# Patient Record
Sex: FEMALE | Race: WHITE | NOT HISPANIC OR LATINO | URBAN - METROPOLITAN AREA
[De-identification: names, ages, dates, MRNs, and addresses within clinical notes are randomized per-mention and may not be internally consistent; named-entity substitution may affect disease eponyms.]

---

## 2017-03-29 ENCOUNTER — OUTPATIENT (OUTPATIENT)
Dept: OUTPATIENT SERVICES | Facility: HOSPITAL | Age: 67
LOS: 1 days | Discharge: HOME | End: 2017-03-29

## 2017-06-07 ENCOUNTER — APPOINTMENT (OUTPATIENT)
Dept: SURGERY | Facility: CLINIC | Age: 67
End: 2017-06-07

## 2017-06-07 VITALS
SYSTOLIC BLOOD PRESSURE: 116 MMHG | HEIGHT: 59 IN | WEIGHT: 106 LBS | DIASTOLIC BLOOD PRESSURE: 72 MMHG | HEART RATE: 70 BPM | BODY MASS INDEX: 21.37 KG/M2 | TEMPERATURE: 98.1 F

## 2017-06-07 DIAGNOSIS — Z80.3 FAMILY HISTORY OF MALIGNANT NEOPLASM OF BREAST: ICD-10-CM

## 2017-06-07 DIAGNOSIS — Z80.7 FAMILY HISTORY OF OTHER MALIGNANT NEOPLASMS OF LYMPHOID, HEMATOPOIETIC AND RELATED TISSUES: ICD-10-CM

## 2017-06-07 DIAGNOSIS — Z87.891 PERSONAL HISTORY OF NICOTINE DEPENDENCE: ICD-10-CM

## 2017-06-27 DIAGNOSIS — R05 COUGH: ICD-10-CM

## 2017-08-07 ENCOUNTER — OUTPATIENT (OUTPATIENT)
Dept: OUTPATIENT SERVICES | Facility: HOSPITAL | Age: 67
LOS: 1 days | Discharge: HOME | End: 2017-08-07

## 2017-08-07 ENCOUNTER — APPOINTMENT (OUTPATIENT)
Dept: HEMATOLOGY ONCOLOGY | Facility: CLINIC | Age: 67
End: 2017-08-07

## 2017-08-07 VITALS
BODY MASS INDEX: 21.37 KG/M2 | SYSTOLIC BLOOD PRESSURE: 114 MMHG | RESPIRATION RATE: 14 BRPM | TEMPERATURE: 97.4 F | DIASTOLIC BLOOD PRESSURE: 70 MMHG | HEIGHT: 59 IN | WEIGHT: 106 LBS | HEART RATE: 67 BPM

## 2017-08-07 RX ORDER — MULTIVITAMIN
TABLET ORAL
Refills: 0 | Status: ACTIVE | COMMUNITY

## 2017-08-10 DIAGNOSIS — Z85.3 PERSONAL HISTORY OF MALIGNANT NEOPLASM OF BREAST: ICD-10-CM

## 2017-08-23 ENCOUNTER — OUTPATIENT (OUTPATIENT)
Dept: OUTPATIENT SERVICES | Facility: HOSPITAL | Age: 67
LOS: 1 days | Discharge: HOME | End: 2017-08-23

## 2017-08-23 DIAGNOSIS — Z85.3 PERSONAL HISTORY OF MALIGNANT NEOPLASM OF BREAST: ICD-10-CM

## 2017-08-29 DIAGNOSIS — R92.2 INCONCLUSIVE MAMMOGRAM: ICD-10-CM

## 2017-08-29 DIAGNOSIS — Z12.31 ENCOUNTER FOR SCREENING MAMMOGRAM FOR MALIGNANT NEOPLASM OF BREAST: ICD-10-CM

## 2018-05-15 ENCOUNTER — OUTPATIENT (OUTPATIENT)
Dept: OUTPATIENT SERVICES | Facility: HOSPITAL | Age: 68
LOS: 1 days | Discharge: HOME | End: 2018-05-15

## 2018-05-15 DIAGNOSIS — K86.2 CYST OF PANCREAS: ICD-10-CM

## 2018-05-19 LAB
ANION GAP SERPL CALC-SCNC: 11 MMOL/L
BUN SERPL-MCNC: 20 MG/DL
CALCIUM SERPL-MCNC: 9.6 MG/DL
CHLORIDE SERPL-SCNC: 101 MMOL/L
CO2 SERPL-SCNC: 30 MMOL/L
CREAT SERPL-MCNC: 0.8 MG/DL
GLUCOSE SERPL-MCNC: 70 MG/DL
POTASSIUM SERPL-SCNC: 4.9 MMOL/L
SODIUM SERPL-SCNC: 142 MMOL/L

## 2018-05-29 ENCOUNTER — OUTPATIENT (OUTPATIENT)
Dept: OUTPATIENT SERVICES | Facility: HOSPITAL | Age: 68
LOS: 1 days | Discharge: HOME | End: 2018-05-29

## 2018-05-29 DIAGNOSIS — K86.2 CYST OF PANCREAS: ICD-10-CM

## 2018-06-18 ENCOUNTER — APPOINTMENT (OUTPATIENT)
Dept: SURGERY | Facility: CLINIC | Age: 68
End: 2018-06-18
Payer: MEDICARE

## 2018-06-18 VITALS
DIASTOLIC BLOOD PRESSURE: 68 MMHG | SYSTOLIC BLOOD PRESSURE: 122 MMHG | WEIGHT: 106 LBS | TEMPERATURE: 98.6 F | HEART RATE: 74 BPM | HEIGHT: 59 IN | BODY MASS INDEX: 21.37 KG/M2

## 2018-06-18 PROCEDURE — 99213 OFFICE O/P EST LOW 20 MIN: CPT

## 2018-08-23 ENCOUNTER — APPOINTMENT (OUTPATIENT)
Dept: OBGYN | Facility: CLINIC | Age: 68
End: 2018-08-23
Payer: MEDICARE

## 2018-08-23 PROCEDURE — G0439: CPT

## 2018-08-23 PROCEDURE — G0101: CPT

## 2018-08-24 ENCOUNTER — OUTPATIENT (OUTPATIENT)
Dept: OUTPATIENT SERVICES | Facility: HOSPITAL | Age: 68
LOS: 1 days | Discharge: HOME | End: 2018-08-24

## 2018-08-24 DIAGNOSIS — Z85.3 PERSONAL HISTORY OF MALIGNANT NEOPLASM OF BREAST: ICD-10-CM

## 2018-08-24 DIAGNOSIS — Z12.31 ENCOUNTER FOR SCREENING MAMMOGRAM FOR MALIGNANT NEOPLASM OF BREAST: ICD-10-CM

## 2018-09-04 ENCOUNTER — APPOINTMENT (OUTPATIENT)
Dept: OBGYN | Facility: CLINIC | Age: 68
End: 2018-09-04
Payer: MEDICARE

## 2018-09-04 PROCEDURE — 76830 TRANSVAGINAL US NON-OB: CPT

## 2018-12-12 ENCOUNTER — APPOINTMENT (OUTPATIENT)
Dept: CARDIOLOGY | Facility: CLINIC | Age: 68
End: 2018-12-12

## 2018-12-12 VITALS
WEIGHT: 109 LBS | HEART RATE: 58 BPM | SYSTOLIC BLOOD PRESSURE: 126 MMHG | BODY MASS INDEX: 21.97 KG/M2 | HEIGHT: 59 IN | DIASTOLIC BLOOD PRESSURE: 70 MMHG

## 2018-12-12 NOTE — HISTORY OF PRESENT ILLNESS
[FreeTextEntry1] : The patient has a history of having breast CA . The patient has has BEASLEY which has remained unchanged. She has not had chest pain . The patient had a echocardiogram done which showed Moderate MR and TR .

## 2018-12-12 NOTE — REVIEW OF SYSTEMS
[Eyeglasses] : currently wearing eyeglasses [Shortness Of Breath] : shortness of breath [Incontinence] : incontinence [Negative] : Heme/Lymph [Chest Pain] : no chest pain [Palpitations] : no palpitations

## 2018-12-12 NOTE — ASSESSMENT
[FreeTextEntry1] : The patient has MR which is moderate . She has moderate TR as well. She radha not have signficant BEASLEY excpt when walking upstairs with her laundry . .

## 2018-12-12 NOTE — PHYSICAL EXAM
[General Appearance - Well Developed] : well developed [Normal Appearance] : normal appearance [Well Groomed] : well groomed [General Appearance - Well Nourished] : well nourished [No Deformities] : no deformities [General Appearance - In No Acute Distress] : no acute distress [Normal Conjunctiva] : the conjunctiva exhibited no abnormalities [Eyelids - No Xanthelasma] : the eyelids demonstrated no xanthelasmas [Normal Oral Mucosa] : normal oral mucosa [No Oral Pallor] : no oral pallor [No Oral Cyanosis] : no oral cyanosis [Normal Jugular Venous A Waves Present] : normal jugular venous A waves present [Normal Jugular Venous V Waves Present] : normal jugular venous V waves present [No Jugular Venous Vizcarra A Waves] : no jugular venous vizcarra A waves [Respiration, Rhythm And Depth] : normal respiratory rhythm and effort [Exaggerated Use Of Accessory Muscles For Inspiration] : no accessory muscle use [Auscultation Breath Sounds / Voice Sounds] : lungs were clear to auscultation bilaterally [Normal Rate] : normal [Rhythm Regular] : regular [No Murmur] : no murmurs heard [1+] : left 1+ [No Pitting Edema] : no pitting edema present [Abdomen Soft] : soft [Abdomen Tenderness] : non-tender [Abdomen Mass (___ Cm)] : no abdominal mass palpated [Abnormal Walk] : normal gait [Gait - Sufficient For Exercise Testing] : the gait was sufficient for exercise testing [Nail Clubbing] : no clubbing of the fingernails [Cyanosis, Localized] : no localized cyanosis [Petechial Hemorrhages (___cm)] : no petechial hemorrhages [Skin Color & Pigmentation] : normal skin color and pigmentation [] : no rash [No Venous Stasis] : no venous stasis [Skin Lesions] : no skin lesions [No Skin Ulcers] : no skin ulcer [No Xanthoma] : no  xanthoma was observed [Oriented To Time, Place, And Person] : oriented to person, place, and time [Affect] : the affect was normal [Mood] : the mood was normal [No Anxiety] : not feeling anxious [Rt] : no varicose veins of the right leg [Lt] : no varicose veins of the left leg

## 2019-01-24 ENCOUNTER — APPOINTMENT (OUTPATIENT)
Dept: CARDIOLOGY | Facility: CLINIC | Age: 69
End: 2019-01-24

## 2019-02-14 ENCOUNTER — APPOINTMENT (OUTPATIENT)
Dept: CARDIOLOGY | Facility: CLINIC | Age: 69
End: 2019-02-14

## 2019-05-08 ENCOUNTER — APPOINTMENT (OUTPATIENT)
Dept: SURGERY | Facility: CLINIC | Age: 69
End: 2019-05-08
Payer: MEDICARE

## 2019-05-08 ENCOUNTER — OUTPATIENT (OUTPATIENT)
Dept: OUTPATIENT SERVICES | Facility: HOSPITAL | Age: 69
LOS: 1 days | Discharge: HOME | End: 2019-05-08

## 2019-05-08 VITALS
HEIGHT: 59 IN | BODY MASS INDEX: 21.17 KG/M2 | DIASTOLIC BLOOD PRESSURE: 64 MMHG | TEMPERATURE: 99.2 F | HEART RATE: 77 BPM | SYSTOLIC BLOOD PRESSURE: 118 MMHG | WEIGHT: 105 LBS

## 2019-05-08 DIAGNOSIS — K86.2 CYST OF PANCREAS: ICD-10-CM

## 2019-05-08 PROCEDURE — 99214 OFFICE O/P EST MOD 30 MIN: CPT

## 2019-05-09 LAB
ANION GAP SERPL CALC-SCNC: 12 MMOL/L
BUN SERPL-MCNC: 12 MG/DL
CALCIUM SERPL-MCNC: 9.6 MG/DL
CANCER AG19-9 SERPL-ACNC: 5 U/ML
CHLORIDE SERPL-SCNC: 103 MMOL/L
CO2 SERPL-SCNC: 28 MMOL/L
CREAT SERPL-MCNC: 0.9 MG/DL
GLUCOSE SERPL-MCNC: 91 MG/DL
POTASSIUM SERPL-SCNC: 5.4 MMOL/L
SODIUM SERPL-SCNC: 143 MMOL/L

## 2019-05-14 NOTE — ASSESSMENT
[FreeTextEntry1] : Ngoc Nazario is a 67 yo female patient with multiple medical problems who comes today with the diagnosis of IPMN and multiple pancreatic cyst found on MRCP - MRI of the abdomen. Currently asymptomatic. She is here today refereed by Dr. Mujica for further treatment recommendation.\par \par Assessment and Plan:\par \par Asymptomatic.\par IPMN multifocal, two cyst, 5 and 6 mm, body-tail, normal PD, No adverse radiological factors.\par MRI with contrast ordered to asses IPMN, if stable next MRI in 1.5 to 2 years.\par \par Return in 1 year for reevaluation.\par \par All the questions were answered to their satisfaction and I encourage the patient to call my office at anytime if she had any questions. Plan of care fully discussed with the patient.\par

## 2019-05-14 NOTE — HISTORY OF PRESENT ILLNESS
[de-identified] : Ngoc Nazario is a 67 yo female patient with multiple medical problems who comes today with the diagnosis of IPMN and multiple pancreatic cyst found on MRCP - MRI of the abdomen. Currently asymptomatic. She is here today refereed by Dr. Mujica for further treatment recommendation.

## 2019-06-18 ENCOUNTER — APPOINTMENT (OUTPATIENT)
Dept: CARDIOLOGY | Facility: CLINIC | Age: 69
End: 2019-06-18
Payer: MEDICARE

## 2019-06-18 VITALS
DIASTOLIC BLOOD PRESSURE: 68 MMHG | SYSTOLIC BLOOD PRESSURE: 90 MMHG | WEIGHT: 105 LBS | BODY MASS INDEX: 21.17 KG/M2 | HEART RATE: 73 BPM | HEIGHT: 59 IN

## 2019-06-18 PROCEDURE — 99214 OFFICE O/P EST MOD 30 MIN: CPT

## 2019-06-18 PROCEDURE — 93000 ELECTROCARDIOGRAM COMPLETE: CPT

## 2019-06-18 NOTE — ASSESSMENT
[FreeTextEntry1] : The patient has MR which was moderate on intial echo mild on stress echo . No symptoms except when walking up stairs with Laundry . ETT echo was negative for ischemia at moderate exercise load by echo images. .

## 2019-06-18 NOTE — REVIEW OF SYSTEMS
[Eyeglasses] : currently wearing eyeglasses [Shortness Of Breath] : shortness of breath [Incontinence] : incontinence [Negative] : Psychiatric [Chest Pain] : no chest pain [Palpitations] : no palpitations

## 2019-06-18 NOTE — HISTORY OF PRESENT ILLNESS
[FreeTextEntry1] : The patient has a history of having breast CA . The patient has has BEASLEY which has remained unchanged. She has not had chest pain . The patient had a echocardiogram done which showed Moderate MR and TR . She had an ETT which showed nondiagnostic ST segement changes . Stress echo was negative for ischemia at moderate exercise load. at over 7 minutes

## 2019-06-18 NOTE — PHYSICAL EXAM
[Normal Appearance] : normal appearance [General Appearance - Well Developed] : well developed [Well Groomed] : well groomed [General Appearance - Well Nourished] : well nourished [General Appearance - In No Acute Distress] : no acute distress [No Deformities] : no deformities [Normal Conjunctiva] : the conjunctiva exhibited no abnormalities [Eyelids - No Xanthelasma] : the eyelids demonstrated no xanthelasmas [Normal Oral Mucosa] : normal oral mucosa [No Oral Cyanosis] : no oral cyanosis [No Oral Pallor] : no oral pallor [Normal Jugular Venous V Waves Present] : normal jugular venous V waves present [Normal Jugular Venous A Waves Present] : normal jugular venous A waves present [No Jugular Venous Vizcarra A Waves] : no jugular venous vizcarra A waves [Respiration, Rhythm And Depth] : normal respiratory rhythm and effort [Auscultation Breath Sounds / Voice Sounds] : lungs were clear to auscultation bilaterally [Abdomen Soft] : soft [Exaggerated Use Of Accessory Muscles For Inspiration] : no accessory muscle use [Abdomen Mass (___ Cm)] : no abdominal mass palpated [Abdomen Tenderness] : non-tender [Abnormal Walk] : normal gait [Nail Clubbing] : no clubbing of the fingernails [Gait - Sufficient For Exercise Testing] : the gait was sufficient for exercise testing [Petechial Hemorrhages (___cm)] : no petechial hemorrhages [Cyanosis, Localized] : no localized cyanosis [No Venous Stasis] : no venous stasis [Skin Color & Pigmentation] : normal skin color and pigmentation [] : no rash [Skin Lesions] : no skin lesions [No Skin Ulcers] : no skin ulcer [No Xanthoma] : no  xanthoma was observed [Oriented To Time, Place, And Person] : oriented to person, place, and time [Mood] : the mood was normal [Affect] : the affect was normal [Normal Rate] : normal [No Anxiety] : not feeling anxious [No Murmur] : no murmurs heard [Rhythm Regular] : regular [No Pitting Edema] : no pitting edema present [1+] : left 1+ [Rt] : no varicose veins of the right leg [Lt] : no varicose veins of the left leg

## 2019-08-26 ENCOUNTER — APPOINTMENT (OUTPATIENT)
Dept: OBGYN | Facility: CLINIC | Age: 69
End: 2019-08-26

## 2019-08-27 ENCOUNTER — OUTPATIENT (OUTPATIENT)
Dept: OUTPATIENT SERVICES | Facility: HOSPITAL | Age: 69
LOS: 1 days | Discharge: HOME | End: 2019-08-27
Payer: MEDICARE

## 2019-08-27 DIAGNOSIS — Z85.3 PERSONAL HISTORY OF MALIGNANT NEOPLASM OF BREAST: ICD-10-CM

## 2019-08-27 PROCEDURE — 77065 DX MAMMO INCL CAD UNI: CPT | Mod: 26,LT

## 2019-08-27 PROCEDURE — G0279: CPT | Mod: 26,LT

## 2019-12-13 ENCOUNTER — OUTPATIENT (OUTPATIENT)
Dept: OUTPATIENT SERVICES | Facility: HOSPITAL | Age: 69
LOS: 1 days | Discharge: HOME | End: 2019-12-13

## 2019-12-13 DIAGNOSIS — I07.1 RHEUMATIC TRICUSPID INSUFFICIENCY: ICD-10-CM

## 2019-12-13 DIAGNOSIS — E78.5 HYPERLIPIDEMIA, UNSPECIFIED: ICD-10-CM

## 2019-12-13 DIAGNOSIS — D49.0 NEOPLASM OF UNSPECIFIED BEHAVIOR OF DIGESTIVE SYSTEM: ICD-10-CM

## 2019-12-13 DIAGNOSIS — I34.0 NONRHEUMATIC MITRAL (VALVE) INSUFFICIENCY: ICD-10-CM

## 2019-12-15 LAB
ALBUMIN SERPL ELPH-MCNC: 4.2 G/DL
ALP BLD-CCNC: 58 U/L
ALT SERPL-CCNC: 15 U/L
ANION GAP SERPL CALC-SCNC: 11 MMOL/L
AST SERPL-CCNC: 21 U/L
BASOPHILS # BLD AUTO: 0.05 K/UL
BASOPHILS NFR BLD AUTO: 1.2 %
BILIRUB SERPL-MCNC: 0.5 MG/DL
BUN SERPL-MCNC: 12 MG/DL
CALCIUM SERPL-MCNC: 9.3 MG/DL
CHLORIDE SERPL-SCNC: 103 MMOL/L
CHOLEST SERPL-MCNC: 202 MG/DL
CHOLEST/HDLC SERPL: 3.9 RATIO
CO2 SERPL-SCNC: 28 MMOL/L
CREAT SERPL-MCNC: 0.8 MG/DL
EOSINOPHIL # BLD AUTO: 0.17 K/UL
EOSINOPHIL NFR BLD AUTO: 4.2 %
GLUCOSE SERPL-MCNC: 99 MG/DL
HCT VFR BLD CALC: 40.3 %
HDLC SERPL-MCNC: 52 MG/DL
HGB BLD-MCNC: 13.2 G/DL
IMM GRANULOCYTES NFR BLD AUTO: 0.2 %
LDLC SERPL CALC-MCNC: 141 MG/DL
LYMPHOCYTES # BLD AUTO: 1.35 K/UL
LYMPHOCYTES NFR BLD AUTO: 33 %
MAN DIFF?: NORMAL
MCHC RBC-ENTMCNC: 31.4 PG
MCHC RBC-ENTMCNC: 32.8 G/DL
MCV RBC AUTO: 95.7 FL
MONOCYTES # BLD AUTO: 0.31 K/UL
MONOCYTES NFR BLD AUTO: 7.6 %
NEUTROPHILS # BLD AUTO: 2.2 K/UL
NEUTROPHILS NFR BLD AUTO: 53.8 %
PLATELET # BLD AUTO: 223 K/UL
POTASSIUM SERPL-SCNC: 5.4 MMOL/L
PROT SERPL-MCNC: 6.3 G/DL
RBC # BLD: 4.21 M/UL
RBC # FLD: 12 %
SODIUM SERPL-SCNC: 142 MMOL/L
TRIGL SERPL-MCNC: 166 MG/DL
WBC # FLD AUTO: 4.09 K/UL

## 2019-12-19 ENCOUNTER — APPOINTMENT (OUTPATIENT)
Dept: CARDIOLOGY | Facility: CLINIC | Age: 69
End: 2019-12-19
Payer: MEDICARE

## 2019-12-19 VITALS
BODY MASS INDEX: 21.17 KG/M2 | WEIGHT: 105 LBS | HEIGHT: 59 IN | SYSTOLIC BLOOD PRESSURE: 104 MMHG | DIASTOLIC BLOOD PRESSURE: 70 MMHG

## 2019-12-19 PROCEDURE — 99214 OFFICE O/P EST MOD 30 MIN: CPT

## 2019-12-19 RX ORDER — ASPIRIN 81 MG
81 TABLET, DELAYED RELEASE (ENTERIC COATED) ORAL
Refills: 0 | Status: DISCONTINUED | COMMUNITY
End: 2019-12-19

## 2019-12-19 NOTE — ASSESSMENT
[FreeTextEntry1] : The patient has MR which was moderate on intial echo mild on stress echo . No symptoms except when walking up stairs with Laundry . ETT echo was negative for ischemia at moderate exercise load by echo images. .She has had pulmonary function test at her PCP's office and results are pending . The patient has increased LDL and Dr. Guerrero had increased her statin . Do not feel that she will get to goal .

## 2019-12-19 NOTE — PHYSICAL EXAM
[General Appearance - Well Developed] : well developed [Normal Appearance] : normal appearance [Well Groomed] : well groomed [General Appearance - Well Nourished] : well nourished [No Deformities] : no deformities [General Appearance - In No Acute Distress] : no acute distress [Normal Conjunctiva] : the conjunctiva exhibited no abnormalities [Eyelids - No Xanthelasma] : the eyelids demonstrated no xanthelasmas [Normal Oral Mucosa] : normal oral mucosa [No Oral Cyanosis] : no oral cyanosis [No Oral Pallor] : no oral pallor [Normal Jugular Venous A Waves Present] : normal jugular venous A waves present [No Jugular Venous Vizcarra A Waves] : no jugular venous vizcarra A waves [Normal Jugular Venous V Waves Present] : normal jugular venous V waves present [Respiration, Rhythm And Depth] : normal respiratory rhythm and effort [Exaggerated Use Of Accessory Muscles For Inspiration] : no accessory muscle use [Auscultation Breath Sounds / Voice Sounds] : lungs were clear to auscultation bilaterally [Abdomen Soft] : soft [Abdomen Tenderness] : non-tender [Abdomen Mass (___ Cm)] : no abdominal mass palpated [Abnormal Walk] : normal gait [Gait - Sufficient For Exercise Testing] : the gait was sufficient for exercise testing [Nail Clubbing] : no clubbing of the fingernails [Cyanosis, Localized] : no localized cyanosis [Petechial Hemorrhages (___cm)] : no petechial hemorrhages [Skin Color & Pigmentation] : normal skin color and pigmentation [] : no rash [No Venous Stasis] : no venous stasis [Skin Lesions] : no skin lesions [No Skin Ulcers] : no skin ulcer [No Xanthoma] : no  xanthoma was observed [Oriented To Time, Place, And Person] : oriented to person, place, and time [Affect] : the affect was normal [No Anxiety] : not feeling anxious [Mood] : the mood was normal [Normal Rate] : normal [Rhythm Regular] : regular [No Murmur] : no murmurs heard [1+] : left 1+ [No Pitting Edema] : no pitting edema present [Rt] : no varicose veins of the right leg [Lt] : no varicose veins of the left leg

## 2020-01-15 ENCOUNTER — APPOINTMENT (OUTPATIENT)
Dept: CARDIOTHORACIC SURGERY | Facility: CLINIC | Age: 70
End: 2020-01-15
Payer: MEDICARE

## 2020-01-15 VITALS
BODY MASS INDEX: 20.36 KG/M2 | SYSTOLIC BLOOD PRESSURE: 131 MMHG | DIASTOLIC BLOOD PRESSURE: 78 MMHG | OXYGEN SATURATION: 98 % | HEART RATE: 66 BPM | WEIGHT: 101 LBS | RESPIRATION RATE: 13 BRPM | HEIGHT: 59 IN | TEMPERATURE: 97.1 F

## 2020-01-15 DIAGNOSIS — Z78.9 OTHER SPECIFIED HEALTH STATUS: ICD-10-CM

## 2020-01-15 DIAGNOSIS — Z80.9 FAMILY HISTORY OF MALIGNANT NEOPLASM, UNSPECIFIED: ICD-10-CM

## 2020-01-15 DIAGNOSIS — Z86.39 PERSONAL HISTORY OF OTHER ENDOCRINE, NUTRITIONAL AND METABOLIC DISEASE: ICD-10-CM

## 2020-01-15 PROCEDURE — 99204 OFFICE O/P NEW MOD 45 MIN: CPT

## 2020-01-15 NOTE — PHYSICAL EXAM
[General Appearance - In No Acute Distress] : in no acute distress [General Appearance - Alert] : alert [] : no respiratory distress [Auscultation Breath Sounds / Voice Sounds] : lungs were clear to auscultation bilaterally [Heart Rate And Rhythm] : heart rate was normal and rhythm regular [Heart Sounds] : normal S1 and S2 [Heart Sounds Gallop] : no gallops [Murmurs] : no murmurs [Heart Sounds Pericardial Friction Rub] : no pericardial rub [Abnormal Walk] : normal gait [Nail Clubbing] : no clubbing  or cyanosis of the fingernails [Motor Tone] : muscle strength and tone were normal [Musculoskeletal - Swelling] : no joint swelling seen [Sensation] : the sensory exam was normal to light touch and pinprick [Deep Tendon Reflexes (DTR)] : deep tendon reflexes were 2+ and symmetric [No Focal Deficits] : no focal deficits [Impaired Insight] : insight and judgment were intact [Affect] : the affect was normal [Oriented To Time, Place, And Person] : oriented to person, place, and time

## 2020-01-16 PROBLEM — Z80.9 FAMILY HISTORY OF MALIGNANT NEOPLASM: Status: ACTIVE | Noted: 2020-01-16
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
## 2020-01-16 NOTE — DATA REVIEWED
[FreeTextEntry1] : \par Patient Name:  CARLOS OLEA	Patient ID:  [id number]\par Patient :   06 Sep 1950	Gender:   Female\par Accession Number:   [id number]	Study Date:   2020 08:39\par Referring Phys.:  Vaibhav Guerrero	Performing Location:   [facility]\par EXAM:  PET CT FDG SKULL BASE\par \par \par IMPRESSION:\par \par 1. 1.3 x 1.4 cm right apical groundglass density with minimal FDG uptake. This lesion could represent infection/inflammation versus FDG nonavid tumor. A short-term follow-up PET scan in 2-3 months is suggested to determine stability of the lesion. Biopsy should be considered if clinical suspicion is high.\par \par 2. The remaining groundglass densities in both lungs are not hypermetabolic, likely post inflammatory. These lesions can BE reevaluated at the time of a repeat PET scan.\par \par 3. FDG nonavid 7 mm cystic focus in the pancreatic body. The lesion is below the resolution of PET scan.\par \par \par \par \par Diagnostic confidence level used in this report:\par \par Consistent with/compatible with or no modifier - greater than 98%\par Most likely - greater than 90%\par Likely/probably - greater than 75%\par Possibly 50%\par Less likely - less than 25%\par Unlikely - less than 5%\par \par AGENT:\par \par 11.7 mCi F18-FDG, IV via the left antecubital vein.\par \par HISTORY:\par \par 69 year old female with suspicious groundglass lesion in the right lung apex seen on the recent CT. History of right breast cancer, right mastectomy in , tamoxifen for 5 years.\par \par EXAM CATEGORY:\par \par Initial staging\par \par TECHNIQUE:\par \par 60 minutes following injection of the radiopharmaceutical, PET/CT imaging was performed from the skull base to the mid thigh. Fasting serum glucose was 95 mg/dL prior to injection. All SUV values reported represent maximum SUV (SUV max) unless otherwise specified.\par \par This study was interpreted using a lean body mass corrected SUV technique. Please note this may result in lower SUV values compared to a body-weight corrected technique. If there is a prior PET/CT for comparison, please see this current report for lean body mass corrected SUV values for the current study and the prior study.\par \par COMPARISON:\par \par 2019 chest CT, 2019 MRI abdomen\par \par FINDINGS:\par \par Head and neck:\par \par There is no suspicious FDG uptake in the head and neck.\par \par No head and neck lymphadenopathy on CT. The thyroid gland is unremarkable. Mild mucosal thickening of the ethmoidal air cells and maxillary sinuses is seen.\par \par Chest:\par \par There is minimal FDG activity in the 1.3 x 1.4 cm groundglass density in the right lung apex on image 54 with SUV 0.5 (1.3 x 1.5 cm on prior CT). This lesion could represent infection/inflammation versus FDG nonavid tumor. A short-term follow-up PET scan in 2-3 months is suggested to determine stability of the lesion. Biopsy should be considered if clinical suspicion is high.\par \par A 1 cm groundglass density in the right lung apex on image 47 is not hypermetabolic, likely representing post inflammatory change. FDG nonavid post inflammatory changes are seen in the right upper lobe posteriorly along the right major fissure on image 59, 1 cm groundglass density in the left upper lobe on image 75. These lesions can be reevaluated at the time of a repeat PET scan.\par \par Normal FDG activity is visualized in the mediastinum, myocardium and chest wall. There is no evidence of abnormal focal hypermetabolic activity around the breast implants.\par \par On CT, there is no hilar, mediastinal or axillary lymphadenopathy. No pleural or pericardial effusions. The heart is normal in size.\par \par Abdomen and pelvis:\par \par There is no suspicious FDG uptake in the abdomen/pelvis.\par \par On CT, there is a 7 mm FDG nonavid cystic focus in the anterior aspect of the pancreatic body on image 120. The lesion is below the resolution of PET scan. The unenhanced liver, gallbladder, spleen, stomach, adrenals, kidneys and uterus are unremarkable. Bowel, retroperitoneum, mesentery unremarkable. No lymphadenopathy. No acute bowel-related abnormality.\par \par Musculoskeletal and extremities:\par \par There are no suspicious FDG-avid osseous lesions.\par \par No aggressive osseous lesions are seen on CT.\par \par =================================================================================\par \par Patient Name:  CARLOS OLEA	Patient ID:  [id number]\par Patient :   06 Sep 1950	Gender:   Female\par Accession Number:   [id number]	Study Date:   26 Dec 2019 12:14\par Referring Phys.:  Vaibhav Guerrero	Performing Location:   [facility]\par EXAM:  THORAX^RR_CT_FEMALE_CHEST_WO (ADULT)\par \par \par IMPRESSION:\par \par BILATERAL BREAST IMPLANTS, RIGHT MASTECTOMY AND RIGHT AXILLARY LYMPHADENECTOMY.\par \par BIAPICAL CHRONIC INFLAMMATORY CHANGES ARE THOUGHT TO ACCOUNT FOR FINDINGS IMAGE 30 SERIES 3.\par \par GROUNDGLASS NODULE RIGHT APEX ANTERIORLY IMAGE 38 SERIES 3 AND A SIGNIFICANTLY MORE ADVANCED LESION RIGHT APEX IMAGE 58 SERIES 3, ARE CONSIDERED HIGH SUSPICION LESIONS AND FURTHER EVALUATION ADVISED, INITIALLY PET/CT RECOMMENDED.\par \par POSTERIOR RIGHT UPPER LOBE POST INFLAMMATORY CHANGE.\par \par ANTERIOR LEFT UPPER LOBE GROUNDGLASS LESION IMAGE 119 SERIES 3 MAY ALSO BE EVALUATED AT PET/CT.\par \par PANCREATIC BODY LOW DENSITY LESION, PATIENT IS KNOWN TO HAVE MULTIFOCAL SIDE BRANCH IPMN.\par \par \par \par \par 2 RIGHT APICAL LESIONS, CONSIDERED HIGH RISK LESIONS AND PET/CT CORRELATION ADVISED. THE LARGER LESION HAS SOLID COMPONENTS.\par \par \par CT CHEST WITHOUT IV CONTRAST\par \par CLINICAL INDICATION: Short of breath sometimes. Right breast cancer , mastectomy performed. Bilateral breast implants placed subsequently. 10 pack-year smoker, quit .\par \par COMPARISON: None.\par \par TECHNIQUE: Noncontrast chest CT was performed. Multiplanar CT and HRCT images were reviewed.\par \par FINDINGS:\par \par LUNGS, AIRWAYS: The trachea, mainstem bronchi and visualized segmental airways are patent.\par -Chronic inflammatory change present in both apices image 30 series 3.\par -In the right lung apex anteriorly, there is a 10 mm groundglass area image 38 series 3. On coronal reconstructions, this lesion is 8 mm long and 1.4 cm AP diameter (image 69 series 10)\par -In the right lung apex, image 58 series 3 there is an approximately 1.5 cm AP by 1.3 cm transverse groundglass lesion. On coronal reconstructions, this lesion is 2 cm long by 1.4 cm wide by 1.8 cm AP. There is a dense, solid component seen on image 43 series 9 and image 77 series 10, attaching this lesion to the superior pleura. This is a neoplastic spectrum lesion and further evaluation is advised.\par -There is volume loss in the right upper lobe posteriorly image 83 series 3 with nodular foci present via likely representing post inflammatory change.\par -In the anterior left upper lobe image 119 series 3 there is a groundglass lesion measuring 1.4 cm on axial images and 0.9 cm on sagittal reconstructions image 43 series 10\par -Small groundglass area left side image 123 series 3, 3 mm.\par -3 mm nodule left upper lobe image 128 series 3\par -14 mm left right lower lobe image 181 series 3.\par -Mucoid impaction right middle lobe image 185 series 3, focal nodule.\par -2 mm right lower lobe nodule image 202 series 3.\par -Faint right lower lobe 2 mm nodule image 214 series 3.\par \par PLEURA: There is no pleural disease.\par \par MEDIASTINUM, JESUS, LYMPH NODES: No mediastinal adenopathy nor mass. No hilar mass. The esophagus is normal.\par \par HEART AND VESSELS: The heart is not enlarged. There is a trace of pericardial fluid, likely not pathologic. The aorta and pulmonary artery are normal.\par \par UPPER ABDOMEN: In the anterior aspect of the pancreatic body, there is an 8 mm wide, well defined low density area, it is noted that the patient has had pancreatic evaluation with MRI on several occasions the right-sided area is more extensive.\par \par BONES AND SOFT TISSUES: Bilateral breast implants present, right mastectomy noted with right axillary surgical clips. No axillary adenopathy. There is no aggressive bony abnormality.\par \par LOWER NECK: There is no thyroid abnormality.\par \par =============================================================================\par \par Spirometry - 20\par FEV1:  Pre:  107.7\par            Post:  110.5\par
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
## 2020-01-27 ENCOUNTER — OUTPATIENT (OUTPATIENT)
Dept: OUTPATIENT SERVICES | Facility: HOSPITAL | Age: 70
LOS: 1 days | Discharge: HOME | End: 2020-01-27
Payer: MEDICARE

## 2020-01-27 VITALS
RESPIRATION RATE: 18 BRPM | WEIGHT: 102.07 LBS | HEIGHT: 59 IN | OXYGEN SATURATION: 98 % | TEMPERATURE: 97 F | DIASTOLIC BLOOD PRESSURE: 57 MMHG | HEART RATE: 66 BPM | SYSTOLIC BLOOD PRESSURE: 125 MMHG

## 2020-01-27 DIAGNOSIS — Z98.890 OTHER SPECIFIED POSTPROCEDURAL STATES: Chronic | ICD-10-CM

## 2020-01-27 DIAGNOSIS — Z01.818 ENCOUNTER FOR OTHER PREPROCEDURAL EXAMINATION: ICD-10-CM

## 2020-01-27 DIAGNOSIS — R91.1 SOLITARY PULMONARY NODULE: ICD-10-CM

## 2020-01-27 DIAGNOSIS — Z90.10 ACQUIRED ABSENCE OF UNSPECIFIED BREAST AND NIPPLE: Chronic | ICD-10-CM

## 2020-01-27 DIAGNOSIS — Z90.49 ACQUIRED ABSENCE OF OTHER SPECIFIED PARTS OF DIGESTIVE TRACT: Chronic | ICD-10-CM

## 2020-01-27 LAB
ALBUMIN SERPL ELPH-MCNC: 4.7 G/DL — SIGNIFICANT CHANGE UP (ref 3.5–5.2)
ALP SERPL-CCNC: 51 U/L — SIGNIFICANT CHANGE UP (ref 30–115)
ALT FLD-CCNC: 24 U/L — SIGNIFICANT CHANGE UP (ref 0–41)
ANION GAP SERPL CALC-SCNC: 16 MMOL/L — HIGH (ref 7–14)
APPEARANCE UR: CLEAR — SIGNIFICANT CHANGE UP
APTT BLD: 27.8 SEC — SIGNIFICANT CHANGE UP (ref 27–39.2)
AST SERPL-CCNC: 22 U/L — SIGNIFICANT CHANGE UP (ref 0–41)
BASOPHILS # BLD AUTO: 0.05 K/UL — SIGNIFICANT CHANGE UP (ref 0–0.2)
BASOPHILS NFR BLD AUTO: 0.9 % — SIGNIFICANT CHANGE UP (ref 0–1)
BILIRUB SERPL-MCNC: 0.6 MG/DL — SIGNIFICANT CHANGE UP (ref 0.2–1.2)
BILIRUB UR-MCNC: NEGATIVE — SIGNIFICANT CHANGE UP
BLD GP AB SCN SERPL QL: SIGNIFICANT CHANGE UP
BUN SERPL-MCNC: 16 MG/DL — SIGNIFICANT CHANGE UP (ref 10–20)
CALCIUM SERPL-MCNC: 9.8 MG/DL — SIGNIFICANT CHANGE UP (ref 8.5–10.1)
CHLORIDE SERPL-SCNC: 102 MMOL/L — SIGNIFICANT CHANGE UP (ref 98–110)
CO2 SERPL-SCNC: 25 MMOL/L — SIGNIFICANT CHANGE UP (ref 17–32)
COLOR SPEC: YELLOW — SIGNIFICANT CHANGE UP
CREAT SERPL-MCNC: 0.8 MG/DL — SIGNIFICANT CHANGE UP (ref 0.7–1.5)
DIFF PNL FLD: NEGATIVE — SIGNIFICANT CHANGE UP
EOSINOPHIL # BLD AUTO: 0.12 K/UL — SIGNIFICANT CHANGE UP (ref 0–0.7)
EOSINOPHIL NFR BLD AUTO: 2.1 % — SIGNIFICANT CHANGE UP (ref 0–8)
GLUCOSE SERPL-MCNC: 74 MG/DL — SIGNIFICANT CHANGE UP (ref 70–99)
GLUCOSE UR QL: NEGATIVE — SIGNIFICANT CHANGE UP
HCT VFR BLD CALC: 40.2 % — SIGNIFICANT CHANGE UP (ref 37–47)
HGB BLD-MCNC: 13.3 G/DL — SIGNIFICANT CHANGE UP (ref 12–16)
IMM GRANULOCYTES NFR BLD AUTO: 0.3 % — SIGNIFICANT CHANGE UP (ref 0.1–0.3)
INR BLD: 1.01 RATIO — SIGNIFICANT CHANGE UP (ref 0.65–1.3)
KETONES UR-MCNC: NEGATIVE — SIGNIFICANT CHANGE UP
LEUKOCYTE ESTERASE UR-ACNC: NEGATIVE — SIGNIFICANT CHANGE UP
LYMPHOCYTES # BLD AUTO: 1.76 K/UL — SIGNIFICANT CHANGE UP (ref 1.2–3.4)
LYMPHOCYTES # BLD AUTO: 30.2 % — SIGNIFICANT CHANGE UP (ref 20.5–51.1)
MCHC RBC-ENTMCNC: 31.7 PG — HIGH (ref 27–31)
MCHC RBC-ENTMCNC: 33.1 G/DL — SIGNIFICANT CHANGE UP (ref 32–37)
MCV RBC AUTO: 95.9 FL — SIGNIFICANT CHANGE UP (ref 81–99)
MONOCYTES # BLD AUTO: 0.42 K/UL — SIGNIFICANT CHANGE UP (ref 0.1–0.6)
MONOCYTES NFR BLD AUTO: 7.2 % — SIGNIFICANT CHANGE UP (ref 1.7–9.3)
NEUTROPHILS # BLD AUTO: 3.46 K/UL — SIGNIFICANT CHANGE UP (ref 1.4–6.5)
NEUTROPHILS NFR BLD AUTO: 59.3 % — SIGNIFICANT CHANGE UP (ref 42.2–75.2)
NITRITE UR-MCNC: NEGATIVE — SIGNIFICANT CHANGE UP
NRBC # BLD: 0 /100 WBCS — SIGNIFICANT CHANGE UP (ref 0–0)
PH UR: 6 — SIGNIFICANT CHANGE UP (ref 5–8)
PLATELET # BLD AUTO: 251 K/UL — SIGNIFICANT CHANGE UP (ref 130–400)
POTASSIUM SERPL-MCNC: 5.6 MMOL/L — HIGH (ref 3.5–5)
POTASSIUM SERPL-SCNC: 5.6 MMOL/L — HIGH (ref 3.5–5)
PROT SERPL-MCNC: 6.8 G/DL — SIGNIFICANT CHANGE UP (ref 6–8)
PROT UR-MCNC: SIGNIFICANT CHANGE UP
PROTHROM AB SERPL-ACNC: 11.6 SEC — SIGNIFICANT CHANGE UP (ref 9.95–12.87)
RBC # BLD: 4.19 M/UL — LOW (ref 4.2–5.4)
RBC # FLD: 12 % — SIGNIFICANT CHANGE UP (ref 11.5–14.5)
SODIUM SERPL-SCNC: 143 MMOL/L — SIGNIFICANT CHANGE UP (ref 135–146)
SP GR SPEC: 1.02 — SIGNIFICANT CHANGE UP (ref 1.01–1.02)
UROBILINOGEN FLD QL: SIGNIFICANT CHANGE UP
WBC # BLD: 5.83 K/UL — SIGNIFICANT CHANGE UP (ref 4.8–10.8)
WBC # FLD AUTO: 5.83 K/UL — SIGNIFICANT CHANGE UP (ref 4.8–10.8)

## 2020-01-27 PROCEDURE — 93010 ELECTROCARDIOGRAM REPORT: CPT

## 2020-01-27 NOTE — H&P PST ADULT - NSICDXPASTMEDICALHX_GEN_ALL_CORE_FT
PAST MEDICAL HISTORY:  Breast cancer right    Constipation     Murmur     SOB (shortness of breath) with mod ectivity

## 2020-01-27 NOTE — H&P PST ADULT - ATTENDING COMMENTS
General Thoracic Surgery Attestation    I have seen and examined the patient.  Where appropriate I have updated, edited, or corrected the resident's or PA's note with regard to findings, values, and plan.    patient with right upper lobe lesion (along with additional GGO in same lobe) plan is for right thoracoscopy and robotic lung resection, possible open.  only updates since clinic H+P was elevated potassium, for which we Rx'd kayexalate and a repeat K is pending at this time.  risks and benefits discussed with the patient and she understands that these include infection, pain, bleeding, damage to thoracic structures, need for thoracotomy, air leak/BPF, chyle leak, need for additional procedures, recurrence.  she understands and wishes to proceed.    of note, patient with markedly reduced jaw mobility.  anesthesia and I have discussed approach to this patient's airway for the case.

## 2020-01-27 NOTE — H&P PST ADULT - NSICDXPASTSURGICALHX_GEN_ALL_CORE_FT
PAST SURGICAL HISTORY:  History of appendectomy     History of mastectomy right    S/P breast reconstruction, right

## 2020-01-28 PROBLEM — R01.1 CARDIAC MURMUR, UNSPECIFIED: Chronic | Status: ACTIVE | Noted: 2020-01-27

## 2020-01-28 PROBLEM — K59.00 CONSTIPATION, UNSPECIFIED: Chronic | Status: ACTIVE | Noted: 2020-01-27

## 2020-01-30 ENCOUNTER — APPOINTMENT (OUTPATIENT)
Dept: CARDIOLOGY | Facility: CLINIC | Age: 70
End: 2020-01-30
Payer: MEDICARE

## 2020-01-30 VITALS
BODY MASS INDEX: 20.36 KG/M2 | WEIGHT: 101 LBS | SYSTOLIC BLOOD PRESSURE: 112 MMHG | DIASTOLIC BLOOD PRESSURE: 62 MMHG | HEIGHT: 59 IN | HEART RATE: 66 BPM

## 2020-01-30 PROBLEM — C50.919 MALIGNANT NEOPLASM OF UNSPECIFIED SITE OF UNSPECIFIED FEMALE BREAST: Chronic | Status: ACTIVE | Noted: 2020-01-27

## 2020-01-30 PROCEDURE — 99214 OFFICE O/P EST MOD 30 MIN: CPT

## 2020-01-30 PROCEDURE — 93000 ELECTROCARDIOGRAM COMPLETE: CPT

## 2020-01-30 NOTE — HISTORY OF PRESENT ILLNESS
[FreeTextEntry1] : The patient had an abnormal CXR and was sent for CT scan which showed abnormal lesion in the right lung . The patient s going for VATS procedure on right lung possible resection  She has had no signficant SOB or chest pain .Ischemia work up less than one year was negative for ischemia .

## 2020-01-30 NOTE — ASSESSMENT
[FreeTextEntry1] : The patient has lung lesions which were abnormal. She had a PET which showed a mildly FDG avid lesion in the RIght apex . She is going for VATS procedure. The patient has more than 4 METS exercise tolerance . She had a negative ischemia workup in 2-19 . She does have MR which in past has been read as moderate but was mild on stress echo done in our ofice in 2-19. The patient is an intermediate cardiac risk undergoing an intermediate risk procedure . Persistent hyperkalemia . Etiology uncertain . May have Type IV RTA .

## 2020-01-30 NOTE — PHYSICAL EXAM
[General Appearance - Well Developed] : well developed [Normal Appearance] : normal appearance [General Appearance - Well Nourished] : well nourished [Well Groomed] : well groomed [General Appearance - In No Acute Distress] : no acute distress [No Deformities] : no deformities [Normal Conjunctiva] : the conjunctiva exhibited no abnormalities [Normal Oral Mucosa] : normal oral mucosa [Eyelids - No Xanthelasma] : the eyelids demonstrated no xanthelasmas [No Oral Cyanosis] : no oral cyanosis [No Oral Pallor] : no oral pallor [No Jugular Venous Vizcarra A Waves] : no jugular venous vizcarra A waves [Normal Jugular Venous A Waves Present] : normal jugular venous A waves present [Normal Jugular Venous V Waves Present] : normal jugular venous V waves present [Respiration, Rhythm And Depth] : normal respiratory rhythm and effort [Exaggerated Use Of Accessory Muscles For Inspiration] : no accessory muscle use [Auscultation Breath Sounds / Voice Sounds] : lungs were clear to auscultation bilaterally [Abdomen Soft] : soft [Abdomen Tenderness] : non-tender [Abnormal Walk] : normal gait [Nail Clubbing] : no clubbing of the fingernails [Gait - Sufficient For Exercise Testing] : the gait was sufficient for exercise testing [Abdomen Mass (___ Cm)] : no abdominal mass palpated [Cyanosis, Localized] : no localized cyanosis [Petechial Hemorrhages (___cm)] : no petechial hemorrhages [Skin Color & Pigmentation] : normal skin color and pigmentation [No Venous Stasis] : no venous stasis [] : no rash [Skin Lesions] : no skin lesions [No Skin Ulcers] : no skin ulcer [No Xanthoma] : no  xanthoma was observed [Affect] : the affect was normal [Oriented To Time, Place, And Person] : oriented to person, place, and time [No Anxiety] : not feeling anxious [Mood] : the mood was normal [Normal Rate] : normal [No Murmur] : no murmurs heard [Rhythm Regular] : regular [1+] : left 1+ [No Pitting Edema] : no pitting edema present [Lt] : no varicose veins of the left leg [Rt] : no varicose veins of the right leg

## 2020-02-05 ENCOUNTER — APPOINTMENT (OUTPATIENT)
Dept: CARDIOTHORACIC SURGERY | Facility: CLINIC | Age: 70
End: 2020-02-05
Payer: MEDICARE

## 2020-02-05 VITALS
OXYGEN SATURATION: 99 % | HEART RATE: 62 BPM | SYSTOLIC BLOOD PRESSURE: 111 MMHG | TEMPERATURE: 98.2 F | DIASTOLIC BLOOD PRESSURE: 73 MMHG | WEIGHT: 101 LBS | BODY MASS INDEX: 20.36 KG/M2 | RESPIRATION RATE: 12 BRPM | HEIGHT: 59 IN

## 2020-02-05 DIAGNOSIS — E87.5 HYPERKALEMIA: ICD-10-CM

## 2020-02-05 LAB
ANION GAP SERPL CALC-SCNC: 12 MMOL/L
BUN SERPL-MCNC: 13 MG/DL
CALCIUM SERPL-MCNC: 9.5 MG/DL
CHLORIDE SERPL-SCNC: 103 MMOL/L
CO2 SERPL-SCNC: 27 MMOL/L
CREAT SERPL-MCNC: 0.9 MG/DL
GLUCOSE SERPL-MCNC: 98 MG/DL
POTASSIUM SERPL-SCNC: 5.6 MMOL/L
SODIUM SERPL-SCNC: 142 MMOL/L

## 2020-02-05 PROCEDURE — 99213 OFFICE O/P EST LOW 20 MIN: CPT

## 2020-02-05 NOTE — PHYSICAL EXAM
[Sclera] : the sclera and conjunctiva were normal [PERRL With Normal Accommodation] : pupils were equal in size, round, and reactive to light [Extraocular Movements] : extraocular movements were intact [Neck Cervical Mass (___cm)] : no neck mass was observed [Neck Appearance] : the appearance of the neck was normal [Jugular Venous Distention Increased] : there was no jugular-venous distention [Thyroid Diffuse Enlargement] : the thyroid was not enlarged [Auscultation Breath Sounds / Voice Sounds] : lungs were clear to auscultation bilaterally [Thyroid Nodule] : there were no palpable thyroid nodules [Heart Sounds] : normal S1 and S2 [Heart Rate And Rhythm] : heart rate was normal and rhythm regular [Murmurs] : no murmurs [Heart Sounds Gallop] : no gallops [Heart Sounds Pericardial Friction Rub] : no pericardial rub [Bowel Sounds] : normal bowel sounds [Abdomen Soft] : soft [Abdomen Tenderness] : non-tender [Abdomen Mass (___ Cm)] : no abdominal mass palpated [Skin Color & Pigmentation] : normal skin color and pigmentation [Skin Turgor] : normal skin turgor [] : no rash [Deep Tendon Reflexes (DTR)] : deep tendon reflexes were 2+ and symmetric [Sensation] : the sensory exam was normal to light touch and pinprick [Oriented To Time, Place, And Person] : oriented to person, place, and time [No Focal Deficits] : no focal deficits [Impaired Insight] : insight and judgment were intact [Affect] : the affect was normal

## 2020-02-06 NOTE — ADDENDUM
[FreeTextEntry1] : Patient's labs resulted. K 5.6. Discussed with Dr. Campos. Plan for Kayexalate 15gm po qd X 2 days. Repeat Friday prior to case.
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
## 2020-02-06 NOTE — DATA REVIEWED
[FreeTextEntry1] : CT chest 1/31/20\par \par 2.7 CM MAXIMUM DIAMETER (image 86 series 10) RIGHT UPPER LOBE APICAL SEGMENTAL PREDOMINANTLY GROUNDGLASS NODULE. MILD GROWTH OF THIS NODULE NOTED.\par \par POSTERIOR RIGHT UPPER LOBE MICRONODULE REGION IS STABLE AND CHRONIC.\par \par MORE CEPHALAD 7 MM GROUNDGLASS AREA IN THE RIGHT UPPER LOBE SHOWS NO GROWTH.\par \par SCATTERED AREAS OF MUCOID IMPACTION NOTED IN MILDLY DILATED MID-LEVEL AIRWAYS.\par \par RIGHT MASTECTOMY AND AXILLARY LYMPH NODE DISSECTION.\par \par CYSTIC LESION PANCREATIC BODY, PATIENT IS KNOWN TO HAVE THESE SINCE AT LEAST 2017.\par \par CT CHEST WITHOUT IV CONTRAST\par \par CLINICAL INDICATION: Preop for right lung resection scheduled for February 2020. Underwent right mastectomy in year 2000. On tamoxifen. 40 pack-year smoker, quit 2015.\par \par COMPARISON: PET/CT dated 1/9/2020 and chest CT dated 12/26/2019.\par \par TECHNIQUE: Noncontrast chest CT was performed. Multiplanar CT and HRCT images were reviewed.\par \par FINDINGS:\par \par LUNGS, AIRWAYS: The trachea, mainstem bronchi and visualized segmental airways are patent.\par -Chronic inflammatory change right apex image 25 series 3, stable\par -Groundglass area right apex anteriorly image 37 series 3, 9 mm compared with 10 mm previously. No significant change.\par -Irregular nodule with pleural tagging, the lesion measures 1.5 cm AP by 1.4 cm transverse and was 1.5 x 1.3 cm previously. On coronal reconstructions, this lesion is 2.2 x 1.6 x 2.0 cm. Matching measurements previously were 2.0 x 1.4 x 1.8 cm. There is mild growth. As stated previously, there is a solid component peripherally.\par -Micronodular appearance noted in the right upper lobe posteriorly, image 76 series 3, this is unchanged.\par -Mucoid impaction in mildly dilated bronchus anterior left upper lobe image 104 series 3 essentially new.\par -Anterior left upper lobe predominantly groundglass area imaged 2024 series 3 measures 1.5 cm and was 1.4 cm previously. This is likely postinflammatory.\par -Small areas of mucoid impaction noted for example left upper lobe image 128 series 3 and left upper lobe image 124 series 3.\par -Mucoid impaction medial segment right middle lobe image 188 series 3, greater compared with previously.\par No new lung nodules. No lung mass.\par \par PLEURA: There is no pleural disease.\par \par MEDIASTINUM, JESUS, LYMPH NODES: No mediastinal adenopathy nor mass. No hilar mass. The esophagus is normal.\par \par HEART AND VESSELS: The heart is not enlarged. No pericardial abnormality. The aorta and pulmonary artery are unremarkable.\par \par UPPER ABDOMEN: Cystic area present in the pancreatic body, approximately 8 mm.\par \par BONES AND SOFT TISSUES: Bilateral breast implants present, right mastectomy but not left. Right axillary surgical clips present. There is no aggressive bony finding.\par \par LOWER NECK: No thyroid abnormality.

## 2020-02-07 ENCOUNTER — RESULT REVIEW (OUTPATIENT)
Age: 70
End: 2020-02-07

## 2020-02-07 ENCOUNTER — TRANSCRIPTION ENCOUNTER (OUTPATIENT)
Age: 70
End: 2020-02-07

## 2020-02-07 ENCOUNTER — INPATIENT (INPATIENT)
Facility: HOSPITAL | Age: 70
LOS: 4 days | Discharge: HOME | End: 2020-02-12
Attending: THORACIC SURGERY (CARDIOTHORACIC VASCULAR SURGERY) | Admitting: THORACIC SURGERY (CARDIOTHORACIC VASCULAR SURGERY)
Payer: MEDICARE

## 2020-02-07 VITALS
TEMPERATURE: 98 F | WEIGHT: 100.97 LBS | HEART RATE: 66 BPM | SYSTOLIC BLOOD PRESSURE: 111 MMHG | HEIGHT: 59 IN | RESPIRATION RATE: 17 BRPM | DIASTOLIC BLOOD PRESSURE: 70 MMHG

## 2020-02-07 DIAGNOSIS — R91.1 SOLITARY PULMONARY NODULE: ICD-10-CM

## 2020-02-07 DIAGNOSIS — Z90.49 ACQUIRED ABSENCE OF OTHER SPECIFIED PARTS OF DIGESTIVE TRACT: Chronic | ICD-10-CM

## 2020-02-07 DIAGNOSIS — Z98.890 OTHER SPECIFIED POSTPROCEDURAL STATES: Chronic | ICD-10-CM

## 2020-02-07 DIAGNOSIS — Z90.10 ACQUIRED ABSENCE OF UNSPECIFIED BREAST AND NIPPLE: Chronic | ICD-10-CM

## 2020-02-07 LAB
ANION GAP SERPL CALC-SCNC: 12 MMOL/L — SIGNIFICANT CHANGE UP (ref 7–14)
BUN SERPL-MCNC: 9 MG/DL — LOW (ref 10–20)
CALCIUM SERPL-MCNC: 9 MG/DL — SIGNIFICANT CHANGE UP (ref 8.5–10.1)
CHLORIDE SERPL-SCNC: 106 MMOL/L — SIGNIFICANT CHANGE UP (ref 98–110)
CO2 SERPL-SCNC: 25 MMOL/L — SIGNIFICANT CHANGE UP (ref 17–32)
CREAT SERPL-MCNC: 0.8 MG/DL — SIGNIFICANT CHANGE UP (ref 0.7–1.5)
GLUCOSE SERPL-MCNC: 96 MG/DL — SIGNIFICANT CHANGE UP (ref 70–99)
POTASSIUM SERPL-MCNC: 4.6 MMOL/L — SIGNIFICANT CHANGE UP (ref 3.5–5)
POTASSIUM SERPL-SCNC: 4.6 MMOL/L — SIGNIFICANT CHANGE UP (ref 3.5–5)
SODIUM SERPL-SCNC: 143 MMOL/L — SIGNIFICANT CHANGE UP (ref 135–146)

## 2020-02-07 PROCEDURE — 32674 THORACOSCOPY LYMPH NODE EXC: CPT

## 2020-02-07 PROCEDURE — 88331 PATH CONSLTJ SURG 1 BLK 1SPC: CPT | Mod: 26

## 2020-02-07 PROCEDURE — 88341 IMHCHEM/IMCYTCHM EA ADD ANTB: CPT | Mod: 26

## 2020-02-07 PROCEDURE — 71045 X-RAY EXAM CHEST 1 VIEW: CPT | Mod: 26

## 2020-02-07 PROCEDURE — 32668 THORACOSCOPY W/W RESECT DIAG: CPT

## 2020-02-07 PROCEDURE — 88363 XM ARCHIVE TISSUE MOLEC ANAL: CPT | Mod: 26

## 2020-02-07 PROCEDURE — 88342 IMHCHEM/IMCYTCHM 1ST ANTB: CPT | Mod: 26

## 2020-02-07 PROCEDURE — 32663 THORACOSCOPY W/LOBECTOMY: CPT

## 2020-02-07 PROCEDURE — 88307 TISSUE EXAM BY PATHOLOGIST: CPT | Mod: 26

## 2020-02-07 PROCEDURE — 88309 TISSUE EXAM BY PATHOLOGIST: CPT | Mod: 26

## 2020-02-07 RX ORDER — NALOXONE HYDROCHLORIDE 4 MG/.1ML
0.1 SPRAY NASAL
Refills: 0 | Status: DISCONTINUED | OUTPATIENT
Start: 2020-02-07 | End: 2020-02-12

## 2020-02-07 RX ORDER — MORPHINE SULFATE 50 MG/1
30 CAPSULE, EXTENDED RELEASE ORAL
Refills: 0 | Status: DISCONTINUED | OUTPATIENT
Start: 2020-02-07 | End: 2020-02-10

## 2020-02-07 RX ORDER — SODIUM CHLORIDE 9 MG/ML
1000 INJECTION, SOLUTION INTRAVENOUS
Refills: 0 | Status: DISCONTINUED | OUTPATIENT
Start: 2020-02-07 | End: 2020-02-09

## 2020-02-07 RX ORDER — HYDROMORPHONE HYDROCHLORIDE 2 MG/ML
0.5 INJECTION INTRAMUSCULAR; INTRAVENOUS; SUBCUTANEOUS
Refills: 0 | Status: DISCONTINUED | OUTPATIENT
Start: 2020-02-07 | End: 2020-02-07

## 2020-02-07 RX ORDER — SENNA PLUS 8.6 MG/1
2 TABLET ORAL AT BEDTIME
Refills: 0 | Status: DISCONTINUED | OUTPATIENT
Start: 2020-02-07 | End: 2020-02-12

## 2020-02-07 RX ORDER — ONDANSETRON 8 MG/1
4 TABLET, FILM COATED ORAL ONCE
Refills: 0 | Status: DISCONTINUED | OUTPATIENT
Start: 2020-02-07 | End: 2020-02-07

## 2020-02-07 RX ORDER — METOPROLOL TARTRATE 50 MG
5 TABLET ORAL EVERY 6 HOURS
Refills: 0 | Status: DISCONTINUED | OUTPATIENT
Start: 2020-02-07 | End: 2020-02-08

## 2020-02-07 RX ORDER — MEPERIDINE HYDROCHLORIDE 50 MG/ML
12.5 INJECTION INTRAMUSCULAR; INTRAVENOUS; SUBCUTANEOUS
Refills: 0 | Status: DISCONTINUED | OUTPATIENT
Start: 2020-02-07 | End: 2020-02-07

## 2020-02-07 RX ORDER — HYDROMORPHONE HYDROCHLORIDE 2 MG/ML
1 INJECTION INTRAMUSCULAR; INTRAVENOUS; SUBCUTANEOUS
Refills: 0 | Status: DISCONTINUED | OUTPATIENT
Start: 2020-02-07 | End: 2020-02-07

## 2020-02-07 RX ORDER — FAMOTIDINE 10 MG/ML
20 INJECTION INTRAVENOUS EVERY 12 HOURS
Refills: 0 | Status: DISCONTINUED | OUTPATIENT
Start: 2020-02-07 | End: 2020-02-12

## 2020-02-07 RX ORDER — ONDANSETRON 8 MG/1
4 TABLET, FILM COATED ORAL EVERY 6 HOURS
Refills: 0 | Status: DISCONTINUED | OUTPATIENT
Start: 2020-02-07 | End: 2020-02-12

## 2020-02-07 RX ORDER — CEFAZOLIN SODIUM 1 G
1000 VIAL (EA) INJECTION EVERY 8 HOURS
Refills: 0 | Status: COMPLETED | OUTPATIENT
Start: 2020-02-07 | End: 2020-02-08

## 2020-02-07 RX ORDER — SODIUM CHLORIDE 9 MG/ML
1000 INJECTION, SOLUTION INTRAVENOUS
Refills: 0 | Status: DISCONTINUED | OUTPATIENT
Start: 2020-02-07 | End: 2020-02-07

## 2020-02-07 RX ORDER — HEPARIN SODIUM 5000 [USP'U]/ML
5000 INJECTION INTRAVENOUS; SUBCUTANEOUS EVERY 12 HOURS
Refills: 0 | Status: DISCONTINUED | OUTPATIENT
Start: 2020-02-07 | End: 2020-02-12

## 2020-02-07 RX ORDER — POLYETHYLENE GLYCOL 3350 17 G/17G
17 POWDER, FOR SOLUTION ORAL DAILY
Refills: 0 | Status: DISCONTINUED | OUTPATIENT
Start: 2020-02-07 | End: 2020-02-12

## 2020-02-07 RX ADMIN — Medication 100 MILLIGRAM(S): at 23:59

## 2020-02-07 RX ADMIN — Medication 5 MILLIGRAM(S): at 23:59

## 2020-02-07 RX ADMIN — HYDROMORPHONE HYDROCHLORIDE 1 MILLIGRAM(S): 2 INJECTION INTRAMUSCULAR; INTRAVENOUS; SUBCUTANEOUS at 19:09

## 2020-02-07 RX ADMIN — SODIUM CHLORIDE 100 MILLILITER(S): 9 INJECTION, SOLUTION INTRAVENOUS at 18:05

## 2020-02-07 RX ADMIN — MORPHINE SULFATE 30 MILLILITER(S): 50 CAPSULE, EXTENDED RELEASE ORAL at 18:30

## 2020-02-07 RX ADMIN — HYDROMORPHONE HYDROCHLORIDE 1 MILLIGRAM(S): 2 INJECTION INTRAMUSCULAR; INTRAVENOUS; SUBCUTANEOUS at 18:27

## 2020-02-07 RX ADMIN — Medication 5 MILLIGRAM(S): at 18:27

## 2020-02-07 RX ADMIN — SODIUM CHLORIDE 50 MILLILITER(S): 9 INJECTION, SOLUTION INTRAVENOUS at 19:00

## 2020-02-07 NOTE — DISCHARGE NOTE PROVIDER - CARE PROVIDER_API CALL
Toan Campos)  Surgery; Thoracic Surgery  [address], [address]  [place] [address]  Phone: [phone]  Fax: [phone]  Follow Up Time: 2 weeks    Vaibhav Guerrero)  Internal Medicine  [address] [place] [address]  Phone: [phone]  Fax: [phone]  Follow Up Time: 2 weeks    Carl Colvin)  Internal Medicine; Pulmonary Disease  [address], [address]  [place] [address]  Phone: [phone]  Fax: [phone]  Follow Up Time: 2 weeks

## 2020-02-07 NOTE — DISCHARGE NOTE PROVIDER - CARE PROVIDERS DIRECT ADDRESSES
[email or web address],DirectAddress_Unknown

## 2020-02-07 NOTE — DISCHARGE NOTE PROVIDER - NSDCMRMEDTOKEN_GEN_ALL_CORE_FT
calcium tabs:   Co Q-10 100 mg oral capsule: 1 cap(s) orally once a day  Doculax 50 mg-8.6 mg oral tablet: 5 tab(s) orally once a day (at bedtime)  Fish Oil:   Flax Seed Oil oral capsule:   magnesium oxide 500 mg oral tablet: 1 tab(s) orally once a day  Multiple Vitamins oral tablet: 1 tab(s) orally once a day  omeprazole 20 mg oral delayed release capsule: 1 cap(s) orally once a day  pravastatin 40 mg oral tablet: 1 tab(s) orally once a day (at bedtime)  VESIcare 5 mg oral tablet: 1 tab(s) orally once a day  Zetia 10 mg oral tablet: 1 tab(s) orally once a day (at bedtime) calcium tabs:   Co Q-10 100 mg oral capsule: 1 cap(s) orally once a day  Doculax 50 mg-8.6 mg oral tablet: 5 tab(s) orally once a day (at bedtime)  Fish Oil:   Flax Seed Oil oral capsule:   gabapentin 300 mg oral capsule: 1 cap(s) orally every 8 hours  ibuprofen 600 mg oral tablet: 1 tab(s) orally every 4 hours, As needed, Moderate Pain (4 - 6)  magnesium oxide 500 mg oral tablet: 1 tab(s) orally once a day  metoprolol tartrate 25 mg oral tablet: 0.5 tab(s) orally every 12 hours   Multiple Vitamins oral tablet: 1 tab(s) orally once a day  omeprazole 20 mg oral delayed release capsule: 1 cap(s) orally once a day  oxycodone-acetaminophen 5 mg-325 mg oral tablet: 1 tab(s) orally every 4 hours, As needed, Moderate Pain (4 - 6) MDD:5  pravastatin 40 mg oral tablet: 1 tab(s) orally once a day (at bedtime)  VESIcare 5 mg oral tablet: 1 tab(s) orally once a day  Zetia 10 mg oral tablet: 1 tab(s) orally once a day (at bedtime)

## 2020-02-07 NOTE — CHART NOTE - NSCHARTNOTEFT_GEN_A_CORE
PACU ANESTHESIA ADMISSION NOTE      Procedure: Right robotic thoracoscopy, RUL wedge resection  Post op diagnosis:  lung cancer    __x__  Patent Airway    _x___  Full return of protective reflexes    _x___  Full recovery from anesthesia / back to baseline status    Vitals:  T(C): 97.4 F  HR: 72  BP: 153/90  RR: 20  SpO2: 100%    Mental Status:  _x___ Awake   ____x_ Alert   _____ Drowsy   _____ Sedated    Nausea/Vomiting:  _x___ NO  ______Yes,   See Post - Op Orders          Pain Scale (0-10):  _____    Treatment: ____ None    __x__ See Post - Op/PCA Orders    Post - Operative Fluids:   ____ Oral   _x___ See Post - Op Orders    Plan: Discharge:   ____Home       _____Floor     __x___Critical Care    _____  Other:_________________    Comments: uneventful anesthesia course no complications. Vitals stable. Pt transferred to PACU. Patient signed out to CTU team, patient stable for transfer of care to the CTU team.

## 2020-02-07 NOTE — DISCHARGE NOTE PROVIDER - PROVIDER TOKENS
PROVIDER:[TOKEN:[[id number]],FOLLOWUP:[2 weeks]],PROVIDER:[TOKEN:[[id number]],FOLLOWUP:[2 weeks]],PROVIDER:[TOKEN:[[id number]],FOLLOWUP:[2 weeks]]

## 2020-02-07 NOTE — CHART NOTE - NSCHARTNOTEFT_GEN_A_CORE
Post Operative Note  Patient: CARLOS OLEA 69y (1950) Female   MRN: [id number]  Location: [address]  Visit: 02-07-20 Inpatient  Date: 02-07-20 @ 23:33    Procedure: S/P robot assisted thoracoscopy for lung mass with RUL resection    Subjective:   Nausea: no, Vomiting: no, Ambulating: no  Pain Assessment: no    Objective:  Vitals: T(F): 96.4 (02-07-20 @ 21:00), Max: 98.2 (02-07-20 @ 09:53)  HR: 65 (02-07-20 @ 23:00)  BP: 137/76 (02-07-20 @ 21:00) (111/70 - 145/67)  RR: 20 (02-07-20 @ 23:00)  SpO2: 100% (02-07-20 @ 23:00)  Vent Settings:     In:   02-07-20 @ 07:01  -  02-07-20 @ 23:33  --------------------------------------------------------  IN: 350 mL      IV Fluids: dextrose 5% + sodium chloride 0.9%. 1000 milliLiter(s) (50 mL/Hr) IV Continuous <Continuous>      Out:   02-07-20 @ 07:01  -  02-07-20 @ 23:33  --------------------------------------------------------  OUT: 225 mL      EBL:     Voided Urine:   02-07-20 @ 07:01  -  02-07-20 @ 23:33  --------------------------------------------------------  OUT: 225 mL      Palomares Catheter: yes     Chest Tube:   02-07-20 @ 07:01 - 02-07-20 @ 23:33  --------------------------------------------------------  OUT: 70 mL             Physical Examination:  General Appearance: NAD  HEENT: EOMI, sclera non-icteric.  Heart: RRR  Lungs: CTABL  Abdomen:  Soft, nontender, nondistended. No rigidity, guarding, or rebound tenderness.   MSK/Extremities: Warm & well-perfused. Peripheral pulses intact.  Skin: Warm, dry. No jaundice.   Incisions/Wounds: Right CT Dressings in place, clean, dry and intact, no signs of infection/active bleeding/drainage, CT to suction    Medications: [Standing]  ceFAZolin   IVPB 1000 milliGRAM(s) IV Intermittent every 8 hours  dextrose 5% + sodium chloride 0.9%. 1000 milliLiter(s) IV Continuous <Continuous>  famotidine    Tablet 20 milliGRAM(s) Oral every 12 hours  heparin  Injectable 5000 Unit(s) SubCutaneous every 12 hours  metoprolol tartrate Injectable 5 milliGRAM(s) IV Push every 6 hours  morphine PCA (5 mG/mL) 30 milliLiter(s) PCA Continuous PCA Continuous  naloxone Injectable 0.1 milliGRAM(s) IV Push every 3 minutes PRN  ondansetron Injectable 4 milliGRAM(s) IV Push every 6 hours PRN  polyethylene glycol 3350 17 Gram(s) Oral daily PRN  senna 2 Tablet(s) Oral at bedtime PRN    Medications: [PRN]  ceFAZolin   IVPB 1000 milliGRAM(s) IV Intermittent every 8 hours  dextrose 5% + sodium chloride 0.9%. 1000 milliLiter(s) IV Continuous <Continuous>  famotidine    Tablet 20 milliGRAM(s) Oral every 12 hours  heparin  Injectable 5000 Unit(s) SubCutaneous every 12 hours  metoprolol tartrate Injectable 5 milliGRAM(s) IV Push every 6 hours  morphine PCA (5 mG/mL) 30 milliLiter(s) PCA Continuous PCA Continuous  naloxone Injectable 0.1 milliGRAM(s) IV Push every 3 minutes PRN  ondansetron Injectable 4 milliGRAM(s) IV Push every 6 hours PRN  polyethylene glycol 3350 17 Gram(s) Oral daily PRN  senna 2 Tablet(s) Oral at bedtime PRN    Labs:    02-07    143  |  106  |  9<L>  ----------------------------<  96  4.6   |  25  |  0.8    Ca    9.0      07 Feb 2020 11:53            Imaging:  No post-op imaging studies    Assessment:  69yFemale patient S/P robot assisted thoracoscopy for lung mass with RUL resection    Plan:  Pain control: morphine PCA  Metoprolol 5mg Q6  Avoid fluid bolus  Monitor Marielle Smith K    Date/Time: 02-07-20 @ 23:33 Post Operative Note  Patient: CARLOS OLEA 69y (1950) Female   MRN: [id number]  Location: [address]  Visit: 02-07-20 Inpatient  Date: 02-07-20 @ 23:33    Procedure: S/P robot assisted thoracoscopy for lung mass with RUL resection    Subjective:   Nausea: no, Vomiting: no, Ambulating: no  Pain Assessment: no    Objective:  Vitals: T(F): 96.4 (02-07-20 @ 21:00), Max: 98.2 (02-07-20 @ 09:53)  HR: 65 (02-07-20 @ 23:00)  BP: 137/76 (02-07-20 @ 21:00) (111/70 - 145/67)  RR: 20 (02-07-20 @ 23:00)  SpO2: 100% (02-07-20 @ 23:00)  Vent Settings:     In:   02-07-20 @ 07:01  -  02-07-20 @ 23:33  --------------------------------------------------------  IN: 350 mL      IV Fluids: dextrose 5% + sodium chloride 0.9%. 1000 milliLiter(s) (50 mL/Hr) IV Continuous <Continuous>      Out:   02-07-20 @ 07:01  -  02-07-20 @ 23:33  --------------------------------------------------------  OUT: 225 mL      EBL:     Voided Urine:   02-07-20 @ 07:01  -  02-07-20 @ 23:33  --------------------------------------------------------  OUT: 225 mL      Palomares Catheter: yes     Chest Tube:   02-07-20 @ 07:01 - 02-07-20 @ 23:33  --------------------------------------------------------  OUT: 70 mL             Physical Examination:  General Appearance: NAD  HEENT: EOMI, sclera non-icteric.  Heart: RRR  Lungs: CTABL  Abdomen:  Soft, nontender, nondistended. No rigidity, guarding, or rebound tenderness.   MSK/Extremities: Warm & well-perfused. Peripheral pulses intact.  Skin: Warm, dry. No jaundice.   Incisions/Wounds: Right CT Dressings in place, clean, dry and intact, no signs of infection/active bleeding/drainage, CT to suction    Medications: [Standing]  ceFAZolin   IVPB 1000 milliGRAM(s) IV Intermittent every 8 hours  dextrose 5% + sodium chloride 0.9%. 1000 milliLiter(s) IV Continuous <Continuous>  famotidine    Tablet 20 milliGRAM(s) Oral every 12 hours  heparin  Injectable 5000 Unit(s) SubCutaneous every 12 hours  metoprolol tartrate Injectable 5 milliGRAM(s) IV Push every 6 hours  morphine PCA (5 mG/mL) 30 milliLiter(s) PCA Continuous PCA Continuous  naloxone Injectable 0.1 milliGRAM(s) IV Push every 3 minutes PRN  ondansetron Injectable 4 milliGRAM(s) IV Push every 6 hours PRN  polyethylene glycol 3350 17 Gram(s) Oral daily PRN  senna 2 Tablet(s) Oral at bedtime PRN    Medications: [PRN]  ceFAZolin   IVPB 1000 milliGRAM(s) IV Intermittent every 8 hours  dextrose 5% + sodium chloride 0.9%. 1000 milliLiter(s) IV Continuous <Continuous>  famotidine    Tablet 20 milliGRAM(s) Oral every 12 hours  heparin  Injectable 5000 Unit(s) SubCutaneous every 12 hours  metoprolol tartrate Injectable 5 milliGRAM(s) IV Push every 6 hours  morphine PCA (5 mG/mL) 30 milliLiter(s) PCA Continuous PCA Continuous  naloxone Injectable 0.1 milliGRAM(s) IV Push every 3 minutes PRN  ondansetron Injectable 4 milliGRAM(s) IV Push every 6 hours PRN  polyethylene glycol 3350 17 Gram(s) Oral daily PRN  senna 2 Tablet(s) Oral at bedtime PRN    Labs:    02-07    143  |  106  |  9<L>  ----------------------------<  96  4.6   |  25  |  0.8    Ca    9.0      07 Feb 2020 11:53            Imaging:  No post-op imaging studies    Assessment:  69yFemale patient S/P robot assisted thoracoscopy for lung mass with RUL resection    Plan:  Pain control: morphine PCA  Metoprolol 5mg Q6  Avoid fluid bolus  Monitor CT output  CT to suction  Monitor Palomares UOP  Trend K  DVT ppx    Date/Time: 02-07-20 @ 23:33

## 2020-02-07 NOTE — DISCHARGE NOTE PROVIDER - NSDCFUSCHEDAPPT_GEN_ALL_CORE_FT
CARLOS OLEA ; 05/06/2020 ; [facility] [place] [address] CARLOS OLEA ; 05/06/2020 ; [facility] [place] [address] CARLOS OLEA ; 05/06/2020 ; [facility] [place] [address]
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
## 2020-02-07 NOTE — DISCHARGE NOTE PROVIDER - NSDCFUADDINST_GEN_ALL_CORE_FT
Activities/Restrictions  1. Do not drive while taking pain medication, Patient may resume driving when able to maneuver vehicle without pain and/or hesitation. 2. Please refrain from any heavy lifting, pulling or pushing over 15 pounds for 2 weeks.  3. Shower daily and carefully wash wounds, pat dry. No baths or swimming for 4 weeks.   4. Do progressive walking exercises every day, gradually increasing to 30 to 40 minutes/day, five days a week.  5. Continue incentive spirometer for 4-6 weeks until your are chest pain free and work of breathing feels the same as pre-operatively.  6. DO NOT DRIVE OR CONSUME ALCOHOL WHILE TAKING PAIN MEDICATION.  Contact your Physician promptly if:  1.  Signs of wound infection, such as increasing redness, swelling, pain or drainage from incisions.  2. Progressive shortness of breath or increasing difficulty with breathing when lying down.  3. Excessive nausea, vomiting, diarrhea or coughing.  4. Increase swelling of legs that does not resolve with leg elevation.  5. Chest pain that spreads to arms, jaw or back or sudden development of numbness, weakness, difficulty speaking or facial droop – Call 911.  6. Diabetics who are unable to keep finger stick glucose under 150 for three consecutive readings.  Instructions:  1. Call office for Temp > 101, pulse greater than 110 or less than 55, BP first # greater than 160 or less than 100, 3 pound weight gain in 1 day or 5 pounds in 3 days  2. Please maintain chest tube incision dressing intact for 2 days.

## 2020-02-07 NOTE — BRIEF OPERATIVE NOTE - OPERATION/FINDINGS
patient with difficult airway, expected, successfully intubated with fiberoptic technique    of note aberrant middle lobe vein from lower lobe trunk.

## 2020-02-07 NOTE — DISCHARGE NOTE PROVIDER - NSDCCPCAREPLAN_GEN_ALL_CORE_FT
PRINCIPAL DISCHARGE DIAGNOSIS  Diagnosis: Lung cancer  Assessment and Plan of Treatment: PRINCIPAL DISCHARGE DIAGNOSIS  Diagnosis: Lung cancer  Assessment and Plan of Treatment: Restart all home medications. For pain, take tylenol, motrin and gabapentin. For severe pain, take percocet as needed. Keep dressing dry for 48 hours. After 48 hours, dressing may be removed. If you develop any symptoms such as shortness of breath or sudden onset chest pain, please seek medical attention. Please follow up with Dr. Campos in one week, Dr. Colvin in 1-2 weeks, and Dr. Guerrero in 1-2 weeks. Please call their respective offices to schedule an appointment.

## 2020-02-08 LAB — GAS PNL BLDA: SIGNIFICANT CHANGE UP

## 2020-02-08 PROCEDURE — 71045 X-RAY EXAM CHEST 1 VIEW: CPT | Mod: 26

## 2020-02-08 PROCEDURE — 99232 SBSQ HOSP IP/OBS MODERATE 35: CPT

## 2020-02-08 RX ORDER — METOPROLOL TARTRATE 50 MG
12.5 TABLET ORAL EVERY 12 HOURS
Refills: 0 | Status: DISCONTINUED | OUTPATIENT
Start: 2020-02-08 | End: 2020-02-12

## 2020-02-08 RX ORDER — ATORVASTATIN CALCIUM 80 MG/1
10 TABLET, FILM COATED ORAL AT BEDTIME
Refills: 0 | Status: DISCONTINUED | OUTPATIENT
Start: 2020-02-08 | End: 2020-02-12

## 2020-02-08 RX ADMIN — FAMOTIDINE 20 MILLIGRAM(S): 10 INJECTION INTRAVENOUS at 18:09

## 2020-02-08 RX ADMIN — Medication 5 MILLIGRAM(S): at 06:04

## 2020-02-08 RX ADMIN — Medication 100 MILLIGRAM(S): at 17:00

## 2020-02-08 RX ADMIN — ATORVASTATIN CALCIUM 10 MILLIGRAM(S): 80 TABLET, FILM COATED ORAL at 22:25

## 2020-02-08 RX ADMIN — HEPARIN SODIUM 5000 UNIT(S): 5000 INJECTION INTRAVENOUS; SUBCUTANEOUS at 05:50

## 2020-02-08 RX ADMIN — FAMOTIDINE 20 MILLIGRAM(S): 10 INJECTION INTRAVENOUS at 05:51

## 2020-02-08 RX ADMIN — Medication 100 MILLIGRAM(S): at 09:56

## 2020-02-08 RX ADMIN — Medication 12.5 MILLIGRAM(S): at 19:42

## 2020-02-08 RX ADMIN — HEPARIN SODIUM 5000 UNIT(S): 5000 INJECTION INTRAVENOUS; SUBCUTANEOUS at 18:09

## 2020-02-08 NOTE — PROGRESS NOTE ADULT - SUBJECTIVE AND OBJECTIVE BOX
GENERAL SURGERY PROGRESS NOTE     CARLOS OLEA  69y  Female  Hospital day :1d  POD:  Procedure: Robot-assisted thoracoscopy    OVERNIGHT EVENTS: Uneventful    T(F): 96.5 (02-08-20 @ 00:00), Max: 98.2 (02-07-20 @ 09:53)  HR: 67 (02-08-20 @ 00:00) (61 - 75)  BP: 137/76 (02-07-20 @ 21:00) (111/70 - 145/67)  ABP: 103/62 (02-08-20 @ 00:00) (103/62 - 168/70)  ABP(mean): 81 (02-08-20 @ 00:00) (81 - 103)  RR: 20 (02-08-20 @ 00:00) (15 - 20)  SpO2: 100% (02-08-20 @ 00:00) (100% - 100%)    DIET/FLUIDS: dextrose 5% + sodium chloride 0.9%. 1000 milliLiter(s) IV Continuous <Continuous>      URINE:    Indwelling Urethral Catheter:     Connect To:  Straight Drainage/Anselmo    Indication:  Perioperative use for Selected Surgical Procedures    Additional Instructions:  For 48 hours (02-07-20 @ 12:30)    GI proph:  famotidine    Tablet 20 milliGRAM(s) Oral every 12 hours    AC/ proph: heparin  Injectable 5000 Unit(s) SubCutaneous every 12 hours    ABx: ceFAZolin   IVPB 1000 milliGRAM(s) IV Intermittent every 8 hours      PHYSICAL EXAM:  General Appearance: NAD  HEENT: EOMI, sclera non-icteric.  Heart: RRR  Lungs: CTABL  Abdomen:  Soft, nontender, nondistended. No rigidity, guarding, or rebound tenderness.   MSK/Extremities: Warm & well-perfused. Peripheral pulses intact.  Skin: Warm, dry. No jaundice.   Incisions/Wounds: Right CT Dressings in place, clean, dry and intact, no signs of infection/active bleeding/drainage, CT to suction      LABS  Labs:  CAPILLARY BLOOD GLUCOSE              02-07    143  |  106  |  9<L>  ----------------------------<  96  4.6   |  25  |  0.8      Calcium, Total Serum: 9.0 mg/dL (02-07-20 @ 11:53)        RADIOLOGY & ADDITIONAL TESTS:    No Post Op images

## 2020-02-08 NOTE — PROGRESS NOTE ADULT - ASSESSMENT
PROBLEMS:  I spent 45 minutes of time examining patient, reviewing vitals, labs, medications, imaging and discussing with the team goals of care to prevent life-threatening in this patient who is at high risk for deterioration or death due to:    1.	Lung mass - Adeno Ca - s/p RUL Lobectomy - keep CT in  2.	change lopressor to PO 12.5 q 12  3.	stop IVF  4.	ambulate, advance diet, d/c Folay, A.line    PLAN  Neuro: move all 4 extremities. no sensory or motor deficits  Pain management.     Pulm: Wean off supplemental oxygen as able. Daily CXR. Encourage coughing, deep breathing and use of incentive spirometry.     Cardio: Monitor telemetry/alarms. Continue supportive care   metoprolol tartrate 12.5 milliGRAM(s) Oral every 12 hours    GI: Continue stool softeners.      Nutrition: Continue present diet  Endocrine and glucose control:   atorvastatin 10 milliGRAM(s) Oral at bedtime    Renal: monitor urine output, supplement electrolytes as needed,     Vasc: Heparin SC and/or SCDs for DVT prophylaxis    ID: Stable, no fever , no chills. Off antibiotics.    Tubes: Monitor Chest tube output  Therapy: OOB/ambulate  Disposition: start planing discharge home or placement    Pertinent clinical, laboratory, radiographic, hemodynamic, echocardiographic, respiratory data, microbiologic data and chart were reviewed and analyzed frequently throughout the course of the day and night. GI and DVT prophylaxis, glycemic control, head of bed elevation and skin care issues were addressed.  Patient seen, examined and plan discussed with CT Surgery / CTICU team during rounds.    [ ] The patient remains in critical and unstable condition, and requires ICU care and monitoring  [x ] The patient is improving but requires continued monitoring and adjustment of therapy

## 2020-02-08 NOTE — PROGRESS NOTE ADULT - ASSESSMENT
69yFemale patient S/P robot assisted thoracoscopy for lung mass with RUL resection    Plan:  FU AM CXR  Pain control: morphine PCA  Metoprolol 5mg Q6  Avoid fluid bolus  Monitor CT output  CT to suction  Monitor Palomares UOP  Trend K  DVT ppx

## 2020-02-08 NOTE — PROGRESS NOTE ADULT - SUBJECTIVE AND OBJECTIVE BOX
CTU Attending Progress Daily Note     08 Feb 2020 13:02  Admited 02-07-20, Hospital Day 1d  POD# - 1    HPI:    Home Medications:  calcium tabs:  (07 Feb 2020 09:51)  Co Q-10 100 mg oral capsule: 1 cap(s) orally once a day (07 Feb 2020 09:51)  Doculax 50 mg-8.6 mg oral tablet: 5 tab(s) orally once a day (at bedtime) (07 Feb 2020 09:51)  Fish Oil:  (07 Feb 2020 09:51)  Flax Seed Oil oral capsule:  (07 Feb 2020 09:51)  magnesium oxide 500 mg oral tablet: 1 tab(s) orally once a day (07 Feb 2020 09:51)  Multiple Vitamins oral tablet: 1 tab(s) orally once a day (07 Feb 2020 09:51)  omeprazole 20 mg oral delayed release capsule: 1 cap(s) orally once a day (07 Feb 2020 09:51)  pravastatin 40 mg oral tablet: 1 tab(s) orally once a day (at bedtime) (07 Feb 2020 09:51)  VESIcare 5 mg oral tablet: 1 tab(s) orally once a day (07 Feb 2020 09:51)  Zetia 10 mg oral tablet: 1 tab(s) orally once a day (at bedtime) (07 Feb 2020 09:51)    FAMILY HISTORY:    PAST MEDICAL & SURGICAL HISTORY:  Breast cancer: right  Constipation  Murmur  SOB (shortness of breath): with mod ectivity  S/P breast reconstruction, right  History of appendectomy  History of mastectomy: right    Interval event for past 24 hr:  CARLOS OLEA  69y had no event.   Current Complains:  CARLOS OLEA has no new complains  Allergies    penicillins (Unknown)    Intolerances      OBJECTIVE:  Vitals last 24 hrs  T(C): 36.4 (02-08-20 @ 08:00), Max: 36.4 (02-07-20 @ 18:05)  T(F): 97.6 (02-08-20 @ 08:00), Max: 97.6 (02-07-20 @ 18:05)  HR: 76 (02-08-20 @ 12:00) (61 - 76)  BP: 86/58 (02-08-20 @ 12:00) (86/58 - 145/67)  ABP: 110/48 (02-08-20 @ 10:00) (103/62 - 168/70)  ABP(mean): 72 (02-08-20 @ 10:00) (70 - 103)  RR: 28 (02-08-20 @ 10:00) (15 - 53)  SpO2: 100% (02-08-20 @ 12:00) (100% - 100%)    02-07-20 @ 07:01  -  02-08-20 @ 07:00  --------------------------------------------------------  IN:    dextrose 5% + sodium chloride 0.9%.: 650 mL    IV PiggyBack: 50 mL    lactated ringers.: 100 mL  Total IN: 800 mL    OUT:    Chest Tube: 250 mL    Indwelling Catheter - Urethral: 495 mL  Total OUT: 745 mL    Total NET: 55 mL          CAPILLARY BLOOD GLUCOSE        LABS:  ABG - ( 08 Feb 2020 05:07 )  pH, Arterial: 7.41  pH, Blood: x     /  pCO2: 40    /  pO2: 127   / HCO3: 25    / Base Excess: 0.3   /  SaO2: 99              Blood Gas Arterial, Lactate: 0.8 mmoL/L (02-08-20 @ 05:07)        02-07    143  |  106  |  9<L>  ----------------------------<  96  4.6   |  25  |  0.8    Ca    9.0      07 Feb 2020 11:53      Creatinine, Serum: 0.8 mg/dL (02-07 @ 11:53)          HOSPITAL MEDICATIONS:  MEDICATIONS  (STANDING):  atorvastatin 10 milliGRAM(s) Oral at bedtime  ceFAZolin   IVPB 1000 milliGRAM(s) IV Intermittent every 8 hours  dextrose 5% + sodium chloride 0.9%. 1000 milliLiter(s) (50 mL/Hr) IV Continuous <Continuous>  famotidine    Tablet 20 milliGRAM(s) Oral every 12 hours  heparin  Injectable 5000 Unit(s) SubCutaneous every 12 hours  metoprolol tartrate 12.5 milliGRAM(s) Oral every 12 hours  morphine PCA (5 mG/mL) 30 milliLiter(s) PCA Continuous PCA Continuous    MEDICATIONS  (PRN):  naloxone Injectable 0.1 milliGRAM(s) IV Push every 3 minutes PRN For ANY of the following changes in patient status:  A. RR LESS THAN 10 breaths per minute, B. Oxygen saturation LESS THAN 90%, C. Sedation score of 6  ondansetron Injectable 4 milliGRAM(s) IV Push every 6 hours PRN Nausea  polyethylene glycol 3350 17 Gram(s) Oral daily PRN Constipation  senna 2 Tablet(s) Oral at bedtime PRN Constipation      REVIEW OF SYSTEMS:  CONSTITUTIONAL: [X] all negative; [ ] weakness, [ ] fevers, [ ] chills  EYES/ENT: [X] all negative; [ ] visual changes, [ ] vertigo, [ ] throat pain, [ ] eye pain  NECK: [X] all negative; [ ] pain, [ ] stiffness  RESPIRATORY: [ ] all negative, [x ] cough, [ ] wheezing, [ ] hemoptysis, [x ] shortness of breath  CARDIOVASCULAR: [ ] all negative; [x ] chest pain, [ ] palpitations, [ ] orthopnea  GASTROINTESTINAL: [X] all negative; [ ]abdominal pain, [ ] nausea, [ ] vomiting, [ ] hematemesis, [ ] diarrhea, [ ] constipation, [ ] melena, [ ] hematochezia.  GENITOURINARY: [X] all negative; [ ] dysuria, [ ] frequency, [ ] hematuria  NEUROLOGICAL: [X] all negative; [ ] numbness, [ ] weakness, [ ] paresthesias  MUSCULOSKELETAL: [X] all negative, [ ] joint pain, [ ] joint swelling, [ ] joint redness, [ ] bone pain  SKIN: [X] all negative; [ ] itching, [ ] burning, [ ] rashes, [ ] lesions   All other review of systems is negative unless indicated above.    [  ] Unable to assess ROS because     PHYSICAL EXAM:          CONSTITUTIONAL: Well-developed; well-nourished; in no acute distress.   	SKIN: warm, dry, no rashes or lesions  	HEENT: Atraumatic. Normocephalic. PERRL. Moist membranes, no conj injection, sclera clear  	NECK: Supple; non tender.  No JVD. No lymphadenopathy.  	CARD: Normal S1, S2. Rate and Rhythm are regular. No murmurs.  	RESP: Good air entry bilaterally, no wheezes, no rales no rhonchi.  	ABD: Soft, not tender, not distended, no CVA ttp no rebound no guarding, bowel sounds present  	EXT: Normal ROM.  No clubbing, no cyanosis, no pedal edema, no calf pain b/l, Peripheral pulses intact.  	LYMPH: No acute cervical adenopathy.  	NEURO: Alert, awake, motor 5/5 R, 5/5 L, sensation intact bilat, CN 2-12 intact,          PSYCH: Cooperative, appropriate. Alert & oriented x 3    RADIOLOGY:  X Reviewed and interpreted by me  CxR from 02-08-20 shows mild congestion, no pneumothorax, no effusion, no cardiomegaly,   Chest Tubes in place,

## 2020-02-09 PROCEDURE — 93010 ELECTROCARDIOGRAM REPORT: CPT

## 2020-02-09 PROCEDURE — 71045 X-RAY EXAM CHEST 1 VIEW: CPT | Mod: 26

## 2020-02-09 RX ORDER — LANOLIN ALCOHOL/MO/W.PET/CERES
3 CREAM (GRAM) TOPICAL AT BEDTIME
Refills: 0 | Status: DISCONTINUED | OUTPATIENT
Start: 2020-02-09 | End: 2020-02-12

## 2020-02-09 RX ORDER — FUROSEMIDE 40 MG
20 TABLET ORAL ONCE
Refills: 0 | Status: COMPLETED | OUTPATIENT
Start: 2020-02-09 | End: 2020-02-09

## 2020-02-09 RX ORDER — SODIUM CHLORIDE 9 MG/ML
1000 INJECTION INTRAMUSCULAR; INTRAVENOUS; SUBCUTANEOUS
Refills: 0 | Status: DISCONTINUED | OUTPATIENT
Start: 2020-02-09 | End: 2020-02-11

## 2020-02-09 RX ORDER — OXYBUTYNIN CHLORIDE 5 MG
5 TABLET ORAL
Refills: 0 | Status: DISCONTINUED | OUTPATIENT
Start: 2020-02-09 | End: 2020-02-12

## 2020-02-09 RX ORDER — GABAPENTIN 400 MG/1
300 CAPSULE ORAL EVERY 8 HOURS
Refills: 0 | Status: DISCONTINUED | OUTPATIENT
Start: 2020-02-09 | End: 2020-02-12

## 2020-02-09 RX ORDER — KETOROLAC TROMETHAMINE 30 MG/ML
30 SYRINGE (ML) INJECTION ONCE
Refills: 0 | Status: DISCONTINUED | OUTPATIENT
Start: 2020-02-09 | End: 2020-02-09

## 2020-02-09 RX ADMIN — GABAPENTIN 300 MILLIGRAM(S): 400 CAPSULE ORAL at 21:27

## 2020-02-09 RX ADMIN — Medication 5 MILLIGRAM(S): at 17:38

## 2020-02-09 RX ADMIN — Medication 12.5 MILLIGRAM(S): at 06:01

## 2020-02-09 RX ADMIN — HEPARIN SODIUM 5000 UNIT(S): 5000 INJECTION INTRAVENOUS; SUBCUTANEOUS at 06:02

## 2020-02-09 RX ADMIN — Medication 20 MILLIGRAM(S): at 10:27

## 2020-02-09 RX ADMIN — Medication 30 MILLIGRAM(S): at 10:01

## 2020-02-09 RX ADMIN — FAMOTIDINE 20 MILLIGRAM(S): 10 INJECTION INTRAVENOUS at 06:01

## 2020-02-09 RX ADMIN — Medication 3 MILLIGRAM(S): at 00:45

## 2020-02-09 RX ADMIN — Medication 12.5 MILLIGRAM(S): at 17:38

## 2020-02-09 RX ADMIN — Medication 3 MILLIGRAM(S): at 21:27

## 2020-02-09 RX ADMIN — SODIUM CHLORIDE 10 MILLILITER(S): 9 INJECTION INTRAMUSCULAR; INTRAVENOUS; SUBCUTANEOUS at 10:02

## 2020-02-09 RX ADMIN — ATORVASTATIN CALCIUM 10 MILLIGRAM(S): 80 TABLET, FILM COATED ORAL at 21:26

## 2020-02-09 RX ADMIN — HEPARIN SODIUM 5000 UNIT(S): 5000 INJECTION INTRAVENOUS; SUBCUTANEOUS at 17:38

## 2020-02-09 RX ADMIN — FAMOTIDINE 20 MILLIGRAM(S): 10 INJECTION INTRAVENOUS at 17:38

## 2020-02-09 RX ADMIN — GABAPENTIN 300 MILLIGRAM(S): 400 CAPSULE ORAL at 13:03

## 2020-02-09 NOTE — PROGRESS NOTE ADULT - ASSESSMENT
Assessment:  69yFemale patient S/P robot assisted thoracoscopy for lung mass with RUL resection    Plan:  - regular diet  - encourage ambulation  - IS  - monitor CT output  - f/u AM cxr  - HSQ q12  - 12.5 PO Lopressor bid  - can be off suction to ambulate

## 2020-02-09 NOTE — PROGRESS NOTE ADULT - SUBJECTIVE AND OBJECTIVE BOX
GENERAL SURGERY PROGRESS NOTE     CARLOS OLEA  69y  Female  Hospital day: 3d  POD: 2d  Procedure: Robot-assisted thoracoscopy    OVERNIGHT EVENTS: no acute events overnight. d/c reginald and Marielle, passed TOV. Tolerating regular diet, ambulating with assistance. Pulling ____ on IS and CT to suction with ____ output. Receiving HSQ q 12 and 12.5 PO lopressor bid.    T(F): 99.2 (02-08-20 @ 16:00), Max: 99.2 (02-08-20 @ 16:00)  HR: 73 (02-08-20 @ 22:00) (62 - 87)  BP: 106/57 (02-08-20 @ 22:00) (86/58 - 116/55)  ABP: 110/48 (02-08-20 @ 10:00) (108/46 - 131/63)  ABP(mean): 72 (02-08-20 @ 10:00) (70 - 91)  RR: 20 (02-08-20 @ 22:00) (15 - 53)  SpO2: 98% (02-08-20 @ 22:00) (97% - 100%)    DIET/FLUIDS: dextrose 5% + sodium chloride 0.9%. 1000 milliLiter(s) IV Continuous <Continuous>    URINE:   02-07-20 @ 07:01  -  02-08-20 @ 07:00  --------------------------------------------------------  OUT: 495 mL     GI proph:  famotidine    Tablet 20 milliGRAM(s) Oral every 12 hours    AC/ proph: heparin  Injectable 5000 Unit(s) SubCutaneous every 12 hours    PHYSICAL EXAM:  GENERAL: NAD, well-appearing  CHEST/LUNG: Clear to auscultation bilaterally, absent breath sounds in RUL, CT dessing c/d/i and CT to suction with serosanguinous output  HEART: Regular rate and rhythm  ABDOMEN: Soft, Nontender, Nondistended;   EXTREMITIES:  No clubbing, cyanosis, or edema    LABS    02-07    143  |  106  |  9<L>  ----------------------------<  96  4.6   |  25  |  0.8    Blood Gas Arterial, Lactate: 0.8 mmoL/L (02-08-20 @ 05:07)    ABG - ( 08 Feb 2020 05:07 )  pH: 7.41  /  pCO2: 40    /  pO2: 127   / HCO3: 25    / Base Excess: 0.3   /  SaO2: 99        RADIOLOGY & ADDITIONAL TESTS:    < from: Xray Chest 1 View AP/PA (02.08.20 @ 06:16) >  Impression:    No radiographic evidence of acute cardiopulmonary disease.  Surgical clips overlie the right upper quadrant of the abdomen and right axilla.  Right-sided chest tube.  < end of copied text > GENERAL SURGERY PROGRESS NOTE     CARLOS OLEA  69y  Female  Hospital day: 3d  POD: 2d  Procedure: Robot-assisted thoracoscopy    OVERNIGHT EVENTS: no acute events overnight. d/c reginald and Marielle, passed TOV. Tolerating regular diet, ambulating with assistance. CT to suction with 380 output. Receiving HSQ q 12 and 12.5 PO lopressor bid.    T(F): 99.2 (02-08-20 @ 16:00), Max: 99.2 (02-08-20 @ 16:00)  HR: 73 (02-08-20 @ 22:00) (62 - 87)  BP: 106/57 (02-08-20 @ 22:00) (86/58 - 116/55)  ABP: 110/48 (02-08-20 @ 10:00) (108/46 - 131/63)  ABP(mean): 72 (02-08-20 @ 10:00) (70 - 91)  RR: 20 (02-08-20 @ 22:00) (15 - 53)  SpO2: 98% (02-08-20 @ 22:00) (97% - 100%)    DIET/FLUIDS: dextrose 5% + sodium chloride 0.9%. 1000 milliLiter(s) IV Continuous <Continuous>    URINE:   02-07-20 @ 07:01  -  02-08-20 @ 07:00  --------------------------------------------------------  OUT: 495 mL     GI proph:  famotidine    Tablet 20 milliGRAM(s) Oral every 12 hours    AC/ proph: heparin  Injectable 5000 Unit(s) SubCutaneous every 12 hours    PHYSICAL EXAM:  GENERAL: NAD, well-appearing  CHEST/LUNG: Clear to auscultation bilaterally, absent breath sounds in RUL, CT dessing c/d/i and CT to suction with serosanguinous output  HEART: Regular rate and rhythm  ABDOMEN: Soft, Nontender, Nondistended;   EXTREMITIES:  No clubbing, cyanosis, or edema    LABS    02-07    143  |  106  |  9<L>  ----------------------------<  96  4.6   |  25  |  0.8    Blood Gas Arterial, Lactate: 0.8 mmoL/L (02-08-20 @ 05:07)    ABG - ( 08 Feb 2020 05:07 )  pH: 7.41  /  pCO2: 40    /  pO2: 127   / HCO3: 25    / Base Excess: 0.3   /  SaO2: 99        RADIOLOGY & ADDITIONAL TESTS:    < from: Xray Chest 1 View AP/PA (02.08.20 @ 06:16) >  Impression:    No radiographic evidence of acute cardiopulmonary disease.  Surgical clips overlie the right upper quadrant of the abdomen and right axilla.  Right-sided chest tube.  < end of copied text >

## 2020-02-10 PROCEDURE — 71045 X-RAY EXAM CHEST 1 VIEW: CPT | Mod: 26

## 2020-02-10 PROCEDURE — 93010 ELECTROCARDIOGRAM REPORT: CPT

## 2020-02-10 PROCEDURE — 71045 X-RAY EXAM CHEST 1 VIEW: CPT | Mod: 26,77

## 2020-02-10 RX ORDER — OXYCODONE AND ACETAMINOPHEN 5; 325 MG/1; MG/1
1 TABLET ORAL EVERY 4 HOURS
Refills: 0 | Status: DISCONTINUED | OUTPATIENT
Start: 2020-02-10 | End: 2020-02-12

## 2020-02-10 RX ORDER — IBUPROFEN 200 MG
600 TABLET ORAL EVERY 4 HOURS
Refills: 0 | Status: DISCONTINUED | OUTPATIENT
Start: 2020-02-10 | End: 2020-02-12

## 2020-02-10 RX ADMIN — GABAPENTIN 300 MILLIGRAM(S): 400 CAPSULE ORAL at 05:40

## 2020-02-10 RX ADMIN — HEPARIN SODIUM 5000 UNIT(S): 5000 INJECTION INTRAVENOUS; SUBCUTANEOUS at 17:49

## 2020-02-10 RX ADMIN — HEPARIN SODIUM 5000 UNIT(S): 5000 INJECTION INTRAVENOUS; SUBCUTANEOUS at 05:40

## 2020-02-10 RX ADMIN — ATORVASTATIN CALCIUM 10 MILLIGRAM(S): 80 TABLET, FILM COATED ORAL at 21:18

## 2020-02-10 RX ADMIN — GABAPENTIN 300 MILLIGRAM(S): 400 CAPSULE ORAL at 12:19

## 2020-02-10 RX ADMIN — Medication 12.5 MILLIGRAM(S): at 06:38

## 2020-02-10 RX ADMIN — Medication 5 MILLIGRAM(S): at 17:49

## 2020-02-10 RX ADMIN — GABAPENTIN 300 MILLIGRAM(S): 400 CAPSULE ORAL at 21:18

## 2020-02-10 RX ADMIN — Medication 3 MILLIGRAM(S): at 21:18

## 2020-02-10 RX ADMIN — Medication 5 MILLIGRAM(S): at 05:40

## 2020-02-10 RX ADMIN — FAMOTIDINE 20 MILLIGRAM(S): 10 INJECTION INTRAVENOUS at 05:40

## 2020-02-10 RX ADMIN — Medication 10 MILLIGRAM(S): at 12:19

## 2020-02-10 RX ADMIN — FAMOTIDINE 20 MILLIGRAM(S): 10 INJECTION INTRAVENOUS at 17:49

## 2020-02-10 NOTE — PROGRESS NOTE ADULT - ASSESSMENT
69yFemale patient S/P robot assisted thoracoscopy for lung mass with RUL resection    Plan:  - regular diet  - encourage ambulation, IS  - monitor CT output  - CT to water seal, f/u AM cxr  - HSQ q12  - 12.5 PO Lopressor bid

## 2020-02-10 NOTE — PROGRESS NOTE ADULT - SUBJECTIVE AND OBJECTIVE BOX
GENERAL SURGERY PROGRESS NOTE     CARLOS OLEA  69y  Female  Hospital day :3d  POD:  Procedure: Robot-assisted thoracoscopy    OVERNIGHT EVENTS:  Tolerating regular diet, ambulating with assistance. CT placed to water seal, CXR 4H afterwards with no concern for pneumothorax. Receiving HSQ q 12 and 12.5 PO lopressor bid. Downgraded to floor, awaiting bed on 4B.     T(F): 99.8 (02-09-20 @ 23:10), Max: 100 (02-09-20 @ 20:00)  HR: 76 (02-10-20 @ 00:00) (66 - 88)  BP: 104/54 (02-10-20 @ 00:00) (84/46 - 128/61)  RR: 20 (02-10-20 @ 00:00) (18 - 20)  SpO2: 97% (02-10-20 @ 00:00) (95% - 99%)    DIET/FLUIDS: sodium chloride 0.9%. 1000 milliLiter(s) IV Continuous <Continuous>    URINE:   02-08-20 @ 07:01  -  02-09-20 @ 07:00  --------------------------------------------------------  OUT: 290 mL    GI proph:  famotidine    Tablet 20 milliGRAM(s) Oral every 12 hours  AC/ proph: heparin  Injectable 5000 Unit(s) SubCutaneous every 12 hours      PHYSICAL EXAM:  GENERAL: NAD, well-appearing  CHEST/LUNG: Clear to auscultation bilaterally, absent breath sounds in RUL, CT dessing c/d/i and CT to suction with serosanguinous output  HEART: Regular rate and rhythm  ABDOMEN: Soft, Nontender, Nondistended;   EXTREMITIES:  No clubbing, cyanosis, or edema      LABS  Blood Gas Arterial, Lactate: 0.8 mmoL/L (02-08-20 @ 05:07)    ABG - ( 08 Feb 2020 05:07 )  pH: 7.41  /  pCO2: 40    /  pO2: 127   / HCO3: 25    / Base Excess: 0.3   /  SaO2: 99          RADIOLOGY & ADDITIONAL TESTS:  < from: Xray Chest 1 View-PORTABLE IMMEDIATE (02.09.20 @ 14:01) >  Findings:    Support devices: Right-sided chest tube with its tip overlying the right  apex.    Cardiac/mediastinum/hilum: Right paratracheal masslike structure is noted.    Lung parenchyma/Pleura: No evidence of right pneumothorax. Masslike opacity is noted in the right paratracheal region. No effusion is seen.    Skeleton/soft tissues: Surgical clips overlie the right upper quadrant of  the abdomen and right axilla.    Impression:  No significant change since the prior exam.  < end of copied text >

## 2020-02-11 LAB
ANION GAP SERPL CALC-SCNC: 12 MMOL/L — SIGNIFICANT CHANGE UP (ref 7–14)
BUN SERPL-MCNC: 8 MG/DL — LOW (ref 10–20)
CALCIUM SERPL-MCNC: 8 MG/DL — LOW (ref 8.5–10.1)
CHLORIDE SERPL-SCNC: 107 MMOL/L — SIGNIFICANT CHANGE UP (ref 98–110)
CO2 SERPL-SCNC: 24 MMOL/L — SIGNIFICANT CHANGE UP (ref 17–32)
CREAT SERPL-MCNC: 0.6 MG/DL — LOW (ref 0.7–1.5)
GLUCOSE SERPL-MCNC: 101 MG/DL — HIGH (ref 70–99)
POTASSIUM SERPL-MCNC: 3.7 MMOL/L — SIGNIFICANT CHANGE UP (ref 3.5–5)
POTASSIUM SERPL-SCNC: 3.7 MMOL/L — SIGNIFICANT CHANGE UP (ref 3.5–5)
SODIUM SERPL-SCNC: 143 MMOL/L — SIGNIFICANT CHANGE UP (ref 135–146)

## 2020-02-11 PROCEDURE — 71045 X-RAY EXAM CHEST 1 VIEW: CPT | Mod: 26,77

## 2020-02-11 PROCEDURE — 71045 X-RAY EXAM CHEST 1 VIEW: CPT | Mod: 26

## 2020-02-11 RX ADMIN — Medication 10 MILLIGRAM(S): at 12:37

## 2020-02-11 RX ADMIN — HEPARIN SODIUM 5000 UNIT(S): 5000 INJECTION INTRAVENOUS; SUBCUTANEOUS at 05:38

## 2020-02-11 RX ADMIN — Medication 12.5 MILLIGRAM(S): at 05:38

## 2020-02-11 RX ADMIN — HEPARIN SODIUM 5000 UNIT(S): 5000 INJECTION INTRAVENOUS; SUBCUTANEOUS at 17:21

## 2020-02-11 RX ADMIN — Medication 5 MILLIGRAM(S): at 17:20

## 2020-02-11 RX ADMIN — Medication 600 MILLIGRAM(S): at 23:46

## 2020-02-11 RX ADMIN — Medication 3 MILLIGRAM(S): at 21:21

## 2020-02-11 RX ADMIN — Medication 600 MILLIGRAM(S): at 09:35

## 2020-02-11 RX ADMIN — ATORVASTATIN CALCIUM 10 MILLIGRAM(S): 80 TABLET, FILM COATED ORAL at 21:21

## 2020-02-11 RX ADMIN — GABAPENTIN 300 MILLIGRAM(S): 400 CAPSULE ORAL at 13:44

## 2020-02-11 RX ADMIN — GABAPENTIN 300 MILLIGRAM(S): 400 CAPSULE ORAL at 21:21

## 2020-02-11 RX ADMIN — Medication 600 MILLIGRAM(S): at 10:00

## 2020-02-11 RX ADMIN — FAMOTIDINE 20 MILLIGRAM(S): 10 INJECTION INTRAVENOUS at 17:20

## 2020-02-11 RX ADMIN — Medication 12.5 MILLIGRAM(S): at 17:21

## 2020-02-11 RX ADMIN — Medication 5 MILLIGRAM(S): at 05:38

## 2020-02-11 RX ADMIN — GABAPENTIN 300 MILLIGRAM(S): 400 CAPSULE ORAL at 05:38

## 2020-02-11 RX ADMIN — FAMOTIDINE 20 MILLIGRAM(S): 10 INJECTION INTRAVENOUS at 05:38

## 2020-02-11 NOTE — PROGRESS NOTE ADULT - ASSESSMENT
69yFemale patient S/P robot assisted thoracoscopy for lung mass with RUL resection. CT to water seal without air leak.     Plan:  - AM CXR   - possible removal of CT today   - lopressor  - pain control

## 2020-02-11 NOTE — PROVIDER CONTACT NOTE (OTHER) - RECOMMENDATIONS
Any intervention at this time?
Do you want chest tube unclamped again? Please come to assess patient at this time.

## 2020-02-11 NOTE — PROVIDER CONTACT NOTE (OTHER) - SITUATION
Patient's current blood pressure is 103/53 HR 66. Order states to notify MD if systolic blood pressure is not between parameters of 110-130.

## 2020-02-11 NOTE — PROVIDER CONTACT NOTE (OTHER) - SITUATION
MD Campos clamped chest tube about 30 minutes ago. Patient currently complaining of slight chest discomfort.

## 2020-02-11 NOTE — PROGRESS NOTE ADULT - SUBJECTIVE AND OBJECTIVE BOX
GENERAL SURGERY PROGRESS NOTE     CARLOS OLEA  69y  Female  Hospital day :4d  POD:  Procedure: Robot-assisted thoracoscopy    OVERNIGHT EVENTS: no acute events, pain well controlled, tolerating diet    T(F): 99.6 (02-10-20 @ 23:47), Max: 99.9 (02-10-20 @ 04:00)  HR: 77 (02-10-20 @ 23:47) (68 - 84)  BP: 103/52 (02-10-20 @ 23:47) (85/47 - 103/52)  ABP: --  ABP(mean): --  RR: 18 (02-10-20 @ 23:47) (17 - 20)  SpO2: 97% (02-10-20 @ 10:34) (97% - 98%)    DIET/FLUIDS: sodium chloride 0.9%. 1000 milliLiter(s) IV Continuous <Continuous>    GI proph:  famotidine    Tablet 20 milliGRAM(s) Oral every 12 hours    AC/ proph: heparin  Injectable 5000 Unit(s) SubCutaneous every 12 hours      PHYSICAL EXAM:  GENERAL: NAD, well-appearing  CHEST/LUNG: CT to water seal w/o air leak, SS output   HEART: Regular rate and rhythm  ABDOMEN: Soft, Nontender, Nondistended;   EXTREMITIES:  No clubbing, cyanosis, or edema      LABS  Labs:  CAPILLARY BLOOD GLUCOSE     Blood Gas Arterial, Lactate: 0.8 mmoL/L (02-08-20 @ 05:07)    ABG - ( 08 Feb 2020 05:07 )  pH: 7.41  /  pCO2: 40    /  pO2: 127   / HCO3: 25    / Base Excess: 0.3   /  SaO2: 99            RADIOLOGY & ADDITIONAL TESTS:  < from: Xray Chest 1 View- PORTABLE-Urgent (02.10.20 @ 12:44) >  Impression:      Persistent left reticular opacity. No definite pneumothorax.    < end of copied text >

## 2020-02-12 ENCOUNTER — TRANSCRIPTION ENCOUNTER (OUTPATIENT)
Age: 70
End: 2020-02-12

## 2020-02-12 VITALS
HEART RATE: 62 BPM | DIASTOLIC BLOOD PRESSURE: 54 MMHG | SYSTOLIC BLOOD PRESSURE: 90 MMHG | RESPIRATION RATE: 18 BRPM | TEMPERATURE: 97 F

## 2020-02-12 PROCEDURE — 71045 X-RAY EXAM CHEST 1 VIEW: CPT | Mod: 26

## 2020-02-12 PROCEDURE — 71045 X-RAY EXAM CHEST 1 VIEW: CPT | Mod: 26,77

## 2020-02-12 RX ORDER — IBUPROFEN 200 MG
1 TABLET ORAL
Qty: 0 | Refills: 0 | DISCHARGE
Start: 2020-02-12

## 2020-02-12 RX ORDER — METOPROLOL TARTRATE 50 MG
0.5 TABLET ORAL
Qty: 14 | Refills: 0
Start: 2020-02-12 | End: 2020-02-25

## 2020-02-12 RX ORDER — GABAPENTIN 400 MG/1
1 CAPSULE ORAL
Qty: 21 | Refills: 0
Start: 2020-02-12 | End: 2020-02-18

## 2020-02-12 RX ADMIN — Medication 12.5 MILLIGRAM(S): at 06:02

## 2020-02-12 RX ADMIN — HEPARIN SODIUM 5000 UNIT(S): 5000 INJECTION INTRAVENOUS; SUBCUTANEOUS at 06:02

## 2020-02-12 RX ADMIN — Medication 10 MILLIGRAM(S): at 14:39

## 2020-02-12 RX ADMIN — GABAPENTIN 300 MILLIGRAM(S): 400 CAPSULE ORAL at 06:02

## 2020-02-12 RX ADMIN — FAMOTIDINE 20 MILLIGRAM(S): 10 INJECTION INTRAVENOUS at 06:02

## 2020-02-12 RX ADMIN — Medication 600 MILLIGRAM(S): at 00:16

## 2020-02-12 RX ADMIN — Medication 5 MILLIGRAM(S): at 06:02

## 2020-02-12 RX ADMIN — GABAPENTIN 300 MILLIGRAM(S): 400 CAPSULE ORAL at 14:39

## 2020-02-12 NOTE — DISCHARGE NOTE NURSING/CASE MANAGEMENT/SOCIAL WORK - NSDCPEEMAIL_GEN_ALL_CORE
[facility] for Tobacco Control email [email or web address]

## 2020-02-12 NOTE — PROGRESS NOTE ADULT - SUBJECTIVE AND OBJECTIVE BOX
Progress Note: Surgery  Patient: CARLOS OLEA , 69y (1950)Female   MRN: [id number]  Location: [address]   Visit: 02-07-20 Inpatient  Date: 02-12-20 @ 05:54    Procedure/Diagnosis: Robot-assisted thoracoscopy  Events over 24h: No acute event overnight. No new complaint.    Vitals: T(F): 99.5 (02-11-20 @ 23:54), Max: 99.5 (02-11-20 @ 07:45)  HR: 71 (02-11-20 @ 23:54)  BP: 107/59 (02-11-20 @ 23:54) (101/52 - 107/59)  RR: 18 (02-11-20 @ 23:54)  SpO2: 97% (02-11-20 @ 08:00)      Diet: Diet, Regular (02-08-20 @ 09:51)    IV Fluid:     In:   02-10-20 @ 07:01  -  02-11-20 @ 07:00  --------------------------------------------------------  IN: 10 mL    02-11-20 @ 07:01  -  02-12-20 @ 05:54  --------------------------------------------------------  IN: 0 mL      Out:   02-10-20 @ 07:01  -  02-11-20 @ 07:00  --------------------------------------------------------  OUT:    Chest Tube: 100 mL  Total OUT: 100 mL      02-11-20 @ 07:01  -  02-12-20 @ 05:54  --------------------------------------------------------  OUT:  Total OUT: 0 mL        Net:   02-10-20 @ 07:01  -  02-11-20 @ 07:00  --------------------------------------------------------  NET: -90 mL    02-11-20 @ 07:01  -  02-12-20 @ 05:54  --------------------------------------------------------  NET: 0 mL        Physical Examination:  General Appearance: NAD, alert and cooperative  HEENT: NCAT, WNL  Heart: S1 and S2. No murmurs. Rhythm is regular.  Lungs: Clear to auscultation BL without rales, rhonchi, wheezing, crackles or diminished breath sounds. CT to water seal  Abdomen: Positive bowel sounds. Soft, nondistended,     Medications: [Standing]  atorvastatin 10 milliGRAM(s) Oral at bedtime  bisacodyl Suppository 10 milliGRAM(s) Rectal daily  famotidine    Tablet 20 milliGRAM(s) Oral every 12 hours  gabapentin 300 milliGRAM(s) Oral every 8 hours  heparin  Injectable 5000 Unit(s) SubCutaneous every 12 hours  melatonin 3 milliGRAM(s) Oral at bedtime  metoprolol tartrate 12.5 milliGRAM(s) Oral every 12 hours  oxybutynin 5 milliGRAM(s) Oral two times a day    Medications:[PRN]  ibuprofen  Tablet. 600 milliGRAM(s) Oral every 4 hours PRN  naloxone Injectable 0.1 milliGRAM(s) IV Push every 3 minutes PRN  ondansetron Injectable 4 milliGRAM(s) IV Push every 6 hours PRN  oxycodone    5 mG/acetaminophen 325 mG 1 Tablet(s) Oral every 4 hours PRN  polyethylene glycol 3350 17 Gram(s) Oral daily PRN  senna 2 Tablet(s) Oral at bedtime PRN    Labs:  02-11    143  |  107  |  8<L>  ----------------------------<  101<H>  3.7   |  24  |  0.6<L>    Ca    8.0<L>      11 Feb 2020 05:18        Micro/Urine:    Imaging:  None/24h

## 2020-02-12 NOTE — DISCHARGE NOTE NURSING/CASE MANAGEMENT/SOCIAL WORK - NSDCPEWEB_GEN_ALL_CORE
[facility] for Tobacco Control website --- [email or web address] --- [email or web address]

## 2020-02-12 NOTE — DISCHARGE NOTE NURSING/CASE MANAGEMENT/SOCIAL WORK - PATIENT PORTAL LINK FT
You can access the FollowMyHealth Patient Portal offered by [facility] by registering at the following website: [email or web address]. By joining [employer]’s FollowMyHealth portal, you will also be able to view your health information using other applications (apps) compatible with our system.

## 2020-02-12 NOTE — PROGRESS NOTE ADULT - ASSESSMENT
Assessment:  69yFemale patient S/P robot assisted thoracoscopy for lung mass with RUL resection. CT to water seal without air leak.  , with the above physical exam, labs, and imaging findings.    Plan:  -Pain control as needed  -Hemodynamic monitoring as per routine  -GI and DVT prophylaxis  -Check and replete CBC and BMP q daily  -Strict input and output monitoring  -CT to waterseal  -remove CT today    Date/Time: 02-12-20 @ 05:54

## 2020-02-14 LAB — SURGICAL PATHOLOGY STUDY: SIGNIFICANT CHANGE UP

## 2020-02-18 ENCOUNTER — FORM ENCOUNTER (OUTPATIENT)
Age: 70
End: 2020-02-18

## 2020-02-19 ENCOUNTER — OUTPATIENT (OUTPATIENT)
Dept: OUTPATIENT SERVICES | Facility: HOSPITAL | Age: 70
LOS: 1 days | Discharge: HOME | End: 2020-02-19
Payer: MEDICARE

## 2020-02-19 ENCOUNTER — APPOINTMENT (OUTPATIENT)
Dept: CARDIOTHORACIC SURGERY | Facility: CLINIC | Age: 70
End: 2020-02-19
Payer: MEDICARE

## 2020-02-19 VITALS
OXYGEN SATURATION: 99 % | RESPIRATION RATE: 14 BRPM | HEIGHT: 59 IN | DIASTOLIC BLOOD PRESSURE: 66 MMHG | TEMPERATURE: 98.3 F | SYSTOLIC BLOOD PRESSURE: 98 MMHG | HEART RATE: 67 BPM

## 2020-02-19 DIAGNOSIS — Z87.891 PERSONAL HISTORY OF NICOTINE DEPENDENCE: ICD-10-CM

## 2020-02-19 DIAGNOSIS — Z90.10 ACQUIRED ABSENCE OF UNSPECIFIED BREAST AND NIPPLE: Chronic | ICD-10-CM

## 2020-02-19 DIAGNOSIS — Z85.3 PERSONAL HISTORY OF MALIGNANT NEOPLASM OF BREAST: ICD-10-CM

## 2020-02-19 DIAGNOSIS — Z09 ENCOUNTER FOR FOLLOW-UP EXAMINATION AFTER COMPLETED TREATMENT FOR CONDITIONS OTHER THAN MALIGNANT NEOPLASM: ICD-10-CM

## 2020-02-19 DIAGNOSIS — C34.11 MALIGNANT NEOPLASM OF UPPER LOBE, RIGHT BRONCHUS OR LUNG: ICD-10-CM

## 2020-02-19 DIAGNOSIS — Z90.49 ACQUIRED ABSENCE OF OTHER SPECIFIED PARTS OF DIGESTIVE TRACT: Chronic | ICD-10-CM

## 2020-02-19 DIAGNOSIS — Z98.890 OTHER SPECIFIED POSTPROCEDURAL STATES: Chronic | ICD-10-CM

## 2020-02-19 PROCEDURE — 99024 POSTOP FOLLOW-UP VISIT: CPT

## 2020-02-19 PROCEDURE — 71046 X-RAY EXAM CHEST 2 VIEWS: CPT | Mod: 26

## 2020-02-19 RX ORDER — SODIUM POLYSTYRENE SULFONATE 15 G/60ML
15 SUSPENSION ORAL DAILY
Qty: 120 | Refills: 0 | Status: DISCONTINUED | COMMUNITY
Start: 2020-02-05 | End: 2020-02-19

## 2020-02-19 RX ORDER — ASCORBIC ACID 125 MG
500 TABLET,CHEWABLE ORAL
Refills: 0 | Status: ACTIVE | COMMUNITY

## 2020-02-21 RX ORDER — OXYCODONE HYDROCHLORIDE AND ACETAMINOPHEN 5; 325 MG/1; MG/1
5-325 TABLET ORAL
Refills: 0 | Status: DISCONTINUED | COMMUNITY
End: 2020-02-21

## 2020-03-02 ENCOUNTER — TRANSCRIPTION ENCOUNTER (OUTPATIENT)
Age: 70
End: 2020-03-02

## 2020-04-25 ENCOUNTER — TRANSCRIPTION ENCOUNTER (OUTPATIENT)
Age: 70
End: 2020-04-25

## 2020-04-28 ENCOUNTER — TRANSCRIPTION ENCOUNTER (OUTPATIENT)
Age: 70
End: 2020-04-28

## 2020-04-30 DIAGNOSIS — M79.2 NEURALGIA AND NEURITIS, UNSPECIFIED: ICD-10-CM

## 2020-05-01 PROBLEM — M79.2 NEUROPATHIC PAIN: Status: ACTIVE | Noted: 2020-05-01

## 2020-05-01 PROBLEM — M79.2 NEUROPATHIC PAIN: Status: RESOLVED | Noted: 2020-02-21 | Resolved: 2020-05-01

## 2020-05-06 ENCOUNTER — APPOINTMENT (OUTPATIENT)
Dept: SURGERY | Facility: CLINIC | Age: 70
End: 2020-05-06

## 2020-05-06 LAB
CANCER AG19-9 SERPL-ACNC: 5 U/ML
CEA SERPL-MCNC: 1 NG/ML

## 2020-05-27 ENCOUNTER — APPOINTMENT (OUTPATIENT)
Dept: CARDIOTHORACIC SURGERY | Facility: CLINIC | Age: 70
End: 2020-05-27
Payer: MEDICARE

## 2020-05-27 PROCEDURE — 99214 OFFICE O/P EST MOD 30 MIN: CPT | Mod: 95

## 2020-06-01 NOTE — DATA REVIEWED
[FreeTextEntry1] : CT CHEST WITH IV CONTRAST -May 15, 2020 11:08 AM\par \par IMPRESSION:\par \par Right upper lobe resection. Groundglass density left upper lobe minimally increased in AP diameter. However the other 2 diameters are stable. This should be closely followed up.\par \par CT CHEST WITH IV CONTRAST\par \par CLINICAL INDICATION: Right lung resection 2/2024 malignancy. Right breast cancer 2000. Appendectomy. 40 pack year smoking history.\par \par COMPARISON: 1/31/2020.\par \par TECHNIQUE: Contrast enhanced CT of the chest was performed. Multiplanar CT and HRCT images were reviewed. 75 cc of nonionic Isovue-300 IV contrast was administered.\par \par FINDINGS:\par \par LUNGS, AIRWAYS: Trachea main and visualized segmental bronchi are patent. Right upper lobe resection. Mucoid impaction one of the segmental left upper lobe bronchi stable.\par There is volume loss in the right middle lobe. Postsurgical changes noted in the right middle lobe and more superiorly.\par Groundglass density left upper lobe currently seen in image 126 series 3 measures 0.7 x 1 cm. Previously 1.5 x 0.4 cm. In sagittal diameter it measures 1 cm. Previously sagittal diameter was 1 cm also. This area should be followed up.\par Minimal scarring left apex stable.\par 6 mm nodule along the major fissure on the left in image 29 is stable.\par Scarring anterior right lower lobe seen in image 26. It is new.\par \par PLEURA: No pleural disease. Clips noted along the posterior inferior right pleura. Mild pleural thickening on the right. This is a new finding and is likely postsurgical. I am uncertain as to why surgery was done in this region. MEDIASTINUM, JESUS, LYMPH NODES: No enlarged axillary adenopathy. Clips noted in the right axilla. No enlarged mediastinal or hilar nodes. No subcarinal nodes. Thoracic esophagus normal.\par \par HEART AND VESSELS: Heart size normal. No pericardial fluid. Ascending aorta normal at 3 cm and descending aorta normal at 2.2 cm. No pericardial fluid.\par \par UPPER ABDOMEN: Adrenals unremarkable. Pancreatic body cyst previously 8 mm is stable. Patient had an MR abdomen in 2019. Which showed a IPMN. Follow-up recommendations had been given.\par \par BONES AND SOFT TISSUES: Bilateral breast implants. No aggressive bony lesion.\par \par LOWER NECK: No abnormality.\par \par Electronic Signature: I personally reviewed the images and agree with this report. Final Report: Dictated by and Signed by Attending Joleen Francis MD 5/15/2020 12:36 PM\par

## 2020-06-01 NOTE — HISTORY OF PRESENT ILLNESS
[Home] : at home, [unfilled] , at the time of the visit. [Medical Office: ([facility])___] : at the medical office located in  [Verbal consent obtained from patient] : the patient, [unfilled] [FreeTextEntry4] : [place] [FreeTextEntry1] : 69 year y/o F was called today using the [employer] telehealth platform for a follow up visit. Due to technical difficulties on the patient's end (she could not see or hear us, but we were able to see and hear her, and she was able to read the texts we were sending via messenger). Patient reports doing well overall and offers not complaints at this time. \par \par Her healthcare team is as follows:\par PMD: Vaibhav Guerrero\par Cardio: Jesus Marino\par Pulm: Carl Colvin.\par Hem/onc: Jorge Martínez (in the past)\par Surgical Oncology: Quintin Tate. \par

## 2020-06-01 NOTE — ASSESSMENT
[FreeTextEntry1] : 69 year y/o F was called today using the [employer] telehealth platform for a follow up visit at patient's request. Due to technical difficulties on the patient's end (she could not see or hear us, but we were able to see and hear her, and she was able to read the texts we were sending via messenger) the video call was terminated and switched to telephone. Patient reports doing well overall and offers not complaints at this time. During the brief time she was visualized, she appeared well nourished, and well groomed. She reports being able to pull 2000 on her incentive spirometer and is only mildly SOB with exertion. CT scan reviewed, results discussed with patient. At present there does not appear to be any gross change to appearance of the groundglass opacity noted on previous scan in the Left upper lobe (currently measures 0.7 x 1 cm. Previously 1.5 x 0.4 cm. In sagittal diameter it measures 1 cm. Previously sagittal diameter was 1 cm also). She was instructed to contact our office with any further questions or concerns, but will otherwise follow up with our office once repeat CT chest is done in 3 weeks.\par \par Plan: repeat CT scan w/ & w/o contrast to be done in 3 months(ordered by Dr. Rodriguez). \par          She will follow up with our office once completed\par \par DEVONTE, Amelia Kennedy, [facility], am acting as scribe for Dr. Toan Campos.\par I personally performed the services described in the documentation, reviewed the documentation recorded by the scribe in my presence and it accurately and completely records my words and actions.\par

## 2020-06-08 ENCOUNTER — APPOINTMENT (OUTPATIENT)
Dept: OBGYN | Facility: CLINIC | Age: 70
End: 2020-06-08

## 2020-06-08 VITALS
TEMPERATURE: 96.9 F | SYSTOLIC BLOOD PRESSURE: 100 MMHG | WEIGHT: 96 LBS | DIASTOLIC BLOOD PRESSURE: 50 MMHG | HEIGHT: 59 IN | BODY MASS INDEX: 19.35 KG/M2

## 2020-06-17 ENCOUNTER — APPOINTMENT (OUTPATIENT)
Dept: SURGERY | Facility: CLINIC | Age: 70
End: 2020-06-17

## 2020-06-19 ENCOUNTER — APPOINTMENT (OUTPATIENT)
Dept: CARDIOLOGY | Facility: CLINIC | Age: 70
End: 2020-06-19

## 2020-09-03 VITALS — DIASTOLIC BLOOD PRESSURE: 69 MMHG | WEIGHT: 96 LBS | SYSTOLIC BLOOD PRESSURE: 112 MMHG | BODY MASS INDEX: 19.39 KG/M2

## 2020-09-05 ENCOUNTER — RESULT REVIEW (OUTPATIENT)
Age: 70
End: 2020-09-05

## 2020-09-05 ENCOUNTER — OUTPATIENT (OUTPATIENT)
Dept: OUTPATIENT SERVICES | Facility: HOSPITAL | Age: 70
LOS: 1 days | Discharge: HOME | End: 2020-09-05
Payer: MEDICARE

## 2020-09-05 DIAGNOSIS — Z12.31 ENCOUNTER FOR SCREENING MAMMOGRAM FOR MALIGNANT NEOPLASM OF BREAST: ICD-10-CM

## 2020-09-05 DIAGNOSIS — Z90.49 ACQUIRED ABSENCE OF OTHER SPECIFIED PARTS OF DIGESTIVE TRACT: Chronic | ICD-10-CM

## 2020-09-05 DIAGNOSIS — Z90.10 ACQUIRED ABSENCE OF UNSPECIFIED BREAST AND NIPPLE: Chronic | ICD-10-CM

## 2020-09-05 DIAGNOSIS — Z98.890 OTHER SPECIFIED POSTPROCEDURAL STATES: Chronic | ICD-10-CM

## 2020-09-05 PROCEDURE — 77067 SCR MAMMO BI INCL CAD: CPT | Mod: 26,52,LT

## 2020-09-16 ENCOUNTER — APPOINTMENT (OUTPATIENT)
Dept: CARDIOTHORACIC SURGERY | Facility: CLINIC | Age: 70
End: 2020-09-16
Payer: MEDICARE

## 2020-09-16 VITALS
RESPIRATION RATE: 16 BRPM | HEART RATE: 74 BPM | BODY MASS INDEX: 18.95 KG/M2 | DIASTOLIC BLOOD PRESSURE: 83 MMHG | SYSTOLIC BLOOD PRESSURE: 123 MMHG | HEIGHT: 59 IN | WEIGHT: 94 LBS | OXYGEN SATURATION: 100 % | TEMPERATURE: 98.4 F

## 2020-09-16 PROCEDURE — 99213 OFFICE O/P EST LOW 20 MIN: CPT

## 2020-09-16 RX ORDER — GABAPENTIN 300 MG/1
300 CAPSULE ORAL 3 TIMES DAILY
Qty: 90 | Refills: 1 | Status: DISCONTINUED | COMMUNITY
End: 2020-09-16

## 2020-09-16 RX ORDER — METOPROLOL TARTRATE 25 MG/1
25 TABLET, FILM COATED ORAL
Qty: 30 | Refills: 0 | Status: DISCONTINUED | COMMUNITY
End: 2020-09-16

## 2020-09-16 RX ORDER — IBUPROFEN 600 MG/1
600 TABLET, FILM COATED ORAL
Refills: 0 | Status: DISCONTINUED | COMMUNITY
End: 2020-09-16

## 2020-09-16 RX ORDER — UBIDECARENONE 400 MG
CAPSULE ORAL
Refills: 0 | Status: DISCONTINUED | COMMUNITY
End: 2020-09-16

## 2020-09-22 ENCOUNTER — APPOINTMENT (OUTPATIENT)
Dept: CARDIOLOGY | Facility: CLINIC | Age: 70
End: 2020-09-22
Payer: MEDICARE

## 2020-09-22 VITALS
SYSTOLIC BLOOD PRESSURE: 118 MMHG | WEIGHT: 97 LBS | DIASTOLIC BLOOD PRESSURE: 64 MMHG | HEIGHT: 59 IN | BODY MASS INDEX: 19.56 KG/M2 | HEART RATE: 63 BPM | TEMPERATURE: 97.3 F

## 2020-09-22 PROCEDURE — 93000 ELECTROCARDIOGRAM COMPLETE: CPT

## 2020-09-22 PROCEDURE — 99214 OFFICE O/P EST MOD 30 MIN: CPT

## 2020-09-22 NOTE — HISTORY OF PRESENT ILLNESS
[FreeTextEntry1] : The patient had partial lung resection and VATS . Had invasive adenoCA . stage I . Seeing Dr. East and Dr. Colvin . Some BEASLEY but no  chest pain

## 2020-09-22 NOTE — PHYSICAL EXAM
[General Appearance - Well Developed] : well developed [Normal Appearance] : normal appearance [Well Groomed] : well groomed [General Appearance - Well Nourished] : well nourished [No Deformities] : no deformities [General Appearance - In No Acute Distress] : no acute distress [Normal Conjunctiva] : the conjunctiva exhibited no abnormalities [Eyelids - No Xanthelasma] : the eyelids demonstrated no xanthelasmas [Normal Oral Mucosa] : normal oral mucosa [No Oral Pallor] : no oral pallor [No Oral Cyanosis] : no oral cyanosis [Normal Jugular Venous A Waves Present] : normal jugular venous A waves present [Normal Jugular Venous V Waves Present] : normal jugular venous V waves present [No Jugular Venous Vizcarra A Waves] : no jugular venous vizcarra A waves [Respiration, Rhythm And Depth] : normal respiratory rhythm and effort [Exaggerated Use Of Accessory Muscles For Inspiration] : no accessory muscle use [Auscultation Breath Sounds / Voice Sounds] : lungs were clear to auscultation bilaterally [Abdomen Soft] : soft [Abdomen Tenderness] : non-tender [Abdomen Mass (___ Cm)] : no abdominal mass palpated [Abnormal Walk] : normal gait [Gait - Sufficient For Exercise Testing] : the gait was sufficient for exercise testing [Nail Clubbing] : no clubbing of the fingernails [Cyanosis, Localized] : no localized cyanosis [Petechial Hemorrhages (___cm)] : no petechial hemorrhages [Skin Color & Pigmentation] : normal skin color and pigmentation [] : no rash [No Venous Stasis] : no venous stasis [Skin Lesions] : no skin lesions [No Skin Ulcers] : no skin ulcer [No Xanthoma] : no  xanthoma was observed [Oriented To Time, Place, And Person] : oriented to person, place, and time [Affect] : the affect was normal [Mood] : the mood was normal [No Anxiety] : not feeling anxious [Normal Rate] : normal [Rhythm Regular] : regular [No Murmur] : no murmurs heard [1+] : left 1+ [No Pitting Edema] : no pitting edema present [Rt] : no varicose veins of the right leg [Lt] : no varicose veins of the left leg

## 2020-09-22 NOTE — ASSESSMENT
[FreeTextEntry1] : The patient had a partial right lung resection . The patient has MR which was read as moderate at one time but last year on stress echo we had read it as mild Not significant on exam however. The patient has not had chest pain , Previous ischemia work up was negative . Persistent higher potassium . Possible Thype IV RTA . The patient was told to maintain a low potassium diet .

## 2020-12-18 ENCOUNTER — RX RENEWAL (OUTPATIENT)
Age: 70
End: 2020-12-18

## 2020-12-18 RX ORDER — EZETIMIBE 10 MG/1
10 TABLET ORAL DAILY
Qty: 90 | Refills: 3 | Status: ACTIVE | COMMUNITY
Start: 2019-12-19 | End: 1900-01-01
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
## 2021-01-14 ENCOUNTER — NON-APPOINTMENT (OUTPATIENT)
Age: 71
End: 2021-01-14

## 2021-03-14 LAB
ALBUMIN SERPL ELPH-MCNC: 4.8 G/DL
ALP BLD-CCNC: 58 U/L
ALT SERPL-CCNC: 24 U/L
ANION GAP SERPL CALC-SCNC: 12 MMOL/L
AST SERPL-CCNC: 30 U/L
BASOPHILS # BLD AUTO: 0.06 K/UL
BASOPHILS NFR BLD AUTO: 1.1 %
BILIRUB SERPL-MCNC: 0.5 MG/DL
BUN SERPL-MCNC: 15 MG/DL
CALCIUM SERPL-MCNC: 10.2 MG/DL
CHLORIDE SERPL-SCNC: 103 MMOL/L
CHOLEST SERPL-MCNC: 158 MG/DL
CO2 SERPL-SCNC: 29 MMOL/L
CREAT SERPL-MCNC: 0.9 MG/DL
EOSINOPHIL # BLD AUTO: 0.13 K/UL
EOSINOPHIL NFR BLD AUTO: 2.4 %
GLUCOSE SERPL-MCNC: 80 MG/DL
HCT VFR BLD CALC: 42.1 %
HDLC SERPL-MCNC: 55 MG/DL
HGB BLD-MCNC: 13.6 G/DL
IMM GRANULOCYTES NFR BLD AUTO: 0.4 %
LDLC SERPL CALC-MCNC: 84 MG/DL
LYMPHOCYTES # BLD AUTO: 1.79 K/UL
LYMPHOCYTES NFR BLD AUTO: 32.4 %
MAN DIFF?: NORMAL
MCHC RBC-ENTMCNC: 31.6 PG
MCHC RBC-ENTMCNC: 32.3 G/DL
MCV RBC AUTO: 97.9 FL
MONOCYTES # BLD AUTO: 0.4 K/UL
MONOCYTES NFR BLD AUTO: 7.2 %
NEUTROPHILS # BLD AUTO: 3.12 K/UL
NEUTROPHILS NFR BLD AUTO: 56.5 %
NONHDLC SERPL-MCNC: 103 MG/DL
PLATELET # BLD AUTO: 272 K/UL
POTASSIUM SERPL-SCNC: 5.2 MMOL/L
PROT SERPL-MCNC: 7 G/DL
RBC # BLD: 4.3 M/UL
RBC # FLD: 12.2 %
SODIUM SERPL-SCNC: 144 MMOL/L
TRIGL SERPL-MCNC: 156 MG/DL
WBC # FLD AUTO: 5.52 K/UL

## 2021-03-17 ENCOUNTER — NON-APPOINTMENT (OUTPATIENT)
Age: 71
End: 2021-03-17

## 2021-03-22 ENCOUNTER — APPOINTMENT (OUTPATIENT)
Dept: CARDIOLOGY | Facility: CLINIC | Age: 71
End: 2021-03-22
Payer: MEDICARE

## 2021-03-22 VITALS
HEIGHT: 59 IN | BODY MASS INDEX: 20.16 KG/M2 | HEART RATE: 68 BPM | TEMPERATURE: 95 F | DIASTOLIC BLOOD PRESSURE: 62 MMHG | WEIGHT: 100 LBS | SYSTOLIC BLOOD PRESSURE: 110 MMHG

## 2021-03-22 DIAGNOSIS — Z01.419 ENCOUNTER FOR GYNECOLOGICAL EXAMINATION (GENERAL) (ROUTINE) W/OUT ABNORMAL FINDINGS: ICD-10-CM

## 2021-03-22 PROCEDURE — 93000 ELECTROCARDIOGRAM COMPLETE: CPT

## 2021-03-22 PROCEDURE — 99214 OFFICE O/P EST MOD 30 MIN: CPT

## 2021-03-22 NOTE — ASSESSMENT
[FreeTextEntry1] : The patient had a partial right lung resection . The patient has MR which was thought to moderate initially and the read a mild on stress echo . LDL is at goal.There is mild increased potassium which had improved on a low potassium diet .

## 2021-03-26 ENCOUNTER — LABORATORY RESULT (OUTPATIENT)
Age: 71
End: 2021-03-26

## 2021-03-26 ENCOUNTER — OUTPATIENT (OUTPATIENT)
Dept: OUTPATIENT SERVICES | Facility: HOSPITAL | Age: 71
LOS: 1 days | Discharge: HOME | End: 2021-03-26

## 2021-03-26 DIAGNOSIS — Z90.49 ACQUIRED ABSENCE OF OTHER SPECIFIED PARTS OF DIGESTIVE TRACT: Chronic | ICD-10-CM

## 2021-03-26 DIAGNOSIS — Z98.890 OTHER SPECIFIED POSTPROCEDURAL STATES: Chronic | ICD-10-CM

## 2021-03-26 DIAGNOSIS — Z90.10 ACQUIRED ABSENCE OF UNSPECIFIED BREAST AND NIPPLE: Chronic | ICD-10-CM

## 2021-03-26 DIAGNOSIS — Z11.59 ENCOUNTER FOR SCREENING FOR OTHER VIRAL DISEASES: ICD-10-CM

## 2021-03-29 ENCOUNTER — APPOINTMENT (OUTPATIENT)
Dept: PULMONOLOGY | Facility: CLINIC | Age: 71
End: 2021-03-29
Payer: MEDICARE

## 2021-03-29 VITALS
OXYGEN SATURATION: 99 % | HEIGHT: 59 IN | HEART RATE: 73 BPM | RESPIRATION RATE: 14 BRPM | DIASTOLIC BLOOD PRESSURE: 70 MMHG | SYSTOLIC BLOOD PRESSURE: 118 MMHG | WEIGHT: 100 LBS | BODY MASS INDEX: 20.16 KG/M2

## 2021-03-29 PROCEDURE — 94729 DIFFUSING CAPACITY: CPT

## 2021-03-29 PROCEDURE — 99213 OFFICE O/P EST LOW 20 MIN: CPT | Mod: 25

## 2021-03-29 PROCEDURE — ZZZZZ: CPT

## 2021-03-29 PROCEDURE — 94010 BREATHING CAPACITY TEST: CPT

## 2021-03-29 PROCEDURE — 94727 GAS DIL/WSHOT DETER LNG VOL: CPT

## 2021-03-29 NOTE — PHYSICAL EXAM
[No Acute Distress] : no acute distress [Normal Oropharynx] : normal oropharynx [Normal Appearance] : normal appearance [No Neck Mass] : no neck mass [Normal Rate/Rhythm] : normal rate/rhythm [Normal S1, S2] : normal s1, s2 [No Murmurs] : no murmurs [No Resp Distress] : no resp distress [Clear to Auscultation Bilaterally] : clear to auscultation bilaterally [No Abnormalities] : no abnormalities [No Clubbing] : no clubbing [No Cyanosis] : no cyanosis [No Edema] : no edema [FROM] : FROM [Normal Color/ Pigmentation] : normal color/ pigmentation [No Focal Deficits] : no focal deficits [Oriented x3] : oriented x3 [Normal Affect] : normal affect

## 2021-07-07 ENCOUNTER — NON-APPOINTMENT (OUTPATIENT)
Age: 71
End: 2021-07-07

## 2021-07-27 ENCOUNTER — APPOINTMENT (OUTPATIENT)
Dept: VASCULAR SURGERY | Facility: CLINIC | Age: 71
End: 2021-07-27
Payer: MEDICARE

## 2021-07-27 ENCOUNTER — NON-APPOINTMENT (OUTPATIENT)
Age: 71
End: 2021-07-27

## 2021-07-27 VITALS — TEMPERATURE: 98 F | WEIGHT: 95 LBS | HEIGHT: 59 IN | BODY MASS INDEX: 19.15 KG/M2

## 2021-07-27 VITALS — DIASTOLIC BLOOD PRESSURE: 70 MMHG | SYSTOLIC BLOOD PRESSURE: 109 MMHG

## 2021-07-27 DIAGNOSIS — Z78.9 OTHER SPECIFIED HEALTH STATUS: ICD-10-CM

## 2021-07-27 DIAGNOSIS — Z85.118 PERSONAL HISTORY OF OTHER MALIGNANT NEOPLASM OF BRONCHUS AND LUNG: ICD-10-CM

## 2021-07-27 PROCEDURE — 99204 OFFICE O/P NEW MOD 45 MIN: CPT

## 2021-07-27 NOTE — HISTORY OF PRESENT ILLNESS
[FreeTextEntry1] : 71 y/o female with h/o smoking, had an arterial duplex that showed PVD and c/o coldness to feet and hands, denies any pain, or h/o ulcerations, denies any claudications, has h/o lung cancer, s/p RUL lobectomy and c/o dyspnea on ambulation.

## 2021-07-27 NOTE — ASSESSMENT
[FreeTextEntry1] : 71 y/o female with h/o smoking with PVD. She is asymptomatic at this time and maintains pulses in the lower extremities bilaterally.\par \par I have advised conservative management and instructed to ambulate as tolerated. I will see her back in one years time or sooner if she develops any new symptoms.

## 2021-08-15 LAB
ALBUMIN SERPL ELPH-MCNC: 4.2 G/DL
ALP BLD-CCNC: 53 U/L
ALT SERPL-CCNC: 21 U/L
ANION GAP SERPL CALC-SCNC: 13 MMOL/L
AST SERPL-CCNC: 20 U/L
BASOPHILS # BLD AUTO: 0.08 K/UL
BASOPHILS NFR BLD AUTO: 1.6 %
BILIRUB SERPL-MCNC: 0.6 MG/DL
BUN SERPL-MCNC: 13 MG/DL
CALCIUM SERPL-MCNC: 9.1 MG/DL
CHLORIDE SERPL-SCNC: 103 MMOL/L
CHOLEST SERPL-MCNC: 153 MG/DL
CO2 SERPL-SCNC: 27 MMOL/L
CREAT SERPL-MCNC: 1 MG/DL
EOSINOPHIL # BLD AUTO: 0.2 K/UL
EOSINOPHIL NFR BLD AUTO: 4.1 %
GLUCOSE SERPL-MCNC: 93 MG/DL
HCT VFR BLD CALC: 42.1 %
HDLC SERPL-MCNC: 68 MG/DL
HGB BLD-MCNC: 13.9 G/DL
IMM GRANULOCYTES NFR BLD AUTO: 0.2 %
LDLC SERPL CALC-MCNC: 70 MG/DL
LYMPHOCYTES # BLD AUTO: 1.52 K/UL
LYMPHOCYTES NFR BLD AUTO: 31.3 %
MAN DIFF?: NORMAL
MCHC RBC-ENTMCNC: 32.3 PG
MCHC RBC-ENTMCNC: 33 G/DL
MCV RBC AUTO: 97.9 FL
MONOCYTES # BLD AUTO: 0.42 K/UL
MONOCYTES NFR BLD AUTO: 8.6 %
NEUTROPHILS # BLD AUTO: 2.63 K/UL
NEUTROPHILS NFR BLD AUTO: 54.2 %
NONHDLC SERPL-MCNC: 85 MG/DL
PLATELET # BLD AUTO: 242 K/UL
POTASSIUM SERPL-SCNC: 5 MMOL/L
PROT SERPL-MCNC: 6.3 G/DL
RBC # BLD: 4.3 M/UL
RBC # FLD: 12.2 %
SODIUM SERPL-SCNC: 143 MMOL/L
TRIGL SERPL-MCNC: 96 MG/DL
WBC # FLD AUTO: 4.86 K/UL

## 2021-08-23 ENCOUNTER — APPOINTMENT (OUTPATIENT)
Dept: CARDIOLOGY | Facility: CLINIC | Age: 71
End: 2021-08-23
Payer: MEDICARE

## 2021-08-23 VITALS
HEIGHT: 59 IN | DIASTOLIC BLOOD PRESSURE: 68 MMHG | TEMPERATURE: 96 F | WEIGHT: 99 LBS | SYSTOLIC BLOOD PRESSURE: 100 MMHG | BODY MASS INDEX: 19.96 KG/M2 | HEART RATE: 60 BPM

## 2021-08-23 PROCEDURE — 93000 ELECTROCARDIOGRAM COMPLETE: CPT

## 2021-08-23 PROCEDURE — 99214 OFFICE O/P EST MOD 30 MIN: CPT

## 2021-08-23 RX ORDER — BACILLUS COAGULANS/INULIN 1B-250 MG
CAPSULE ORAL
Refills: 0 | Status: ACTIVE | COMMUNITY

## 2021-08-23 RX ORDER — OMEPRAZOLE 40 MG/1
40 CAPSULE, DELAYED RELEASE ORAL
Refills: 0 | Status: DISCONTINUED | COMMUNITY
End: 2021-08-23

## 2021-08-23 RX ORDER — OMEPRAZOLE 20 MG/1
20 CAPSULE, DELAYED RELEASE ORAL
Refills: 0 | Status: DISCONTINUED | COMMUNITY
End: 2021-08-23

## 2021-08-23 NOTE — REASON FOR VISIT
[Hyperlipidemia] : hyperlipidemia [Hypertension] : hypertension [Consultation] : a consultation regarding [Mitral Regurgitation] : mitral regurgitation [FreeTextEntry3] : Dr. Guerrero .

## 2021-08-23 NOTE — ASSESSMENT
[FreeTextEntry1] : The pateint has had a partial lung resection and has had PETE . PFT's was accepatable. Has had MR in the past read in the past a moderate and mild . Will get ETT echo in view of risks

## 2021-08-23 NOTE — PHYSICAL EXAM
[General Appearance - Well Developed] : well developed [Normal Appearance] : normal appearance [Well Groomed] : well groomed [No Deformities] : no deformities [General Appearance - Well Nourished] : well nourished [General Appearance - In No Acute Distress] : no acute distress [Normal Conjunctiva] : the conjunctiva exhibited no abnormalities [Eyelids - No Xanthelasma] : the eyelids demonstrated no xanthelasmas [No Oral Pallor] : no oral pallor [Normal Oral Mucosa] : normal oral mucosa [No Oral Cyanosis] : no oral cyanosis [Normal Jugular Venous A Waves Present] : normal jugular venous A waves present [Normal Jugular Venous V Waves Present] : normal jugular venous V waves present [No Jugular Venous Vizcarra A Waves] : no jugular venous vizcarra A waves [Respiration, Rhythm And Depth] : normal respiratory rhythm and effort [Exaggerated Use Of Accessory Muscles For Inspiration] : no accessory muscle use [Auscultation Breath Sounds / Voice Sounds] : lungs were clear to auscultation bilaterally [Abdomen Soft] : soft [Abdomen Tenderness] : non-tender [Abdomen Mass (___ Cm)] : no abdominal mass palpated [Abnormal Walk] : normal gait [Gait - Sufficient For Exercise Testing] : the gait was sufficient for exercise testing [Nail Clubbing] : no clubbing of the fingernails [Cyanosis, Localized] : no localized cyanosis [Petechial Hemorrhages (___cm)] : no petechial hemorrhages [Skin Color & Pigmentation] : normal skin color and pigmentation [] : no rash [No Venous Stasis] : no venous stasis [Skin Lesions] : no skin lesions [No Skin Ulcers] : no skin ulcer [Oriented To Time, Place, And Person] : oriented to person, place, and time [No Xanthoma] : no  xanthoma was observed [Affect] : the affect was normal [Mood] : the mood was normal [No Anxiety] : not feeling anxious [Normal Rate] : normal [Rhythm Regular] : regular [No Murmur] : no murmurs heard [1+] : left 1+ [No Pitting Edema] : no pitting edema present [Rt] : no varicose veins of the right leg [Lt] : no varicose veins of the left leg

## 2021-08-30 ENCOUNTER — APPOINTMENT (OUTPATIENT)
Age: 71
End: 2021-08-30
Payer: MEDICARE

## 2021-08-30 VITALS
RESPIRATION RATE: 14 BRPM | HEIGHT: 59 IN | SYSTOLIC BLOOD PRESSURE: 128 MMHG | HEART RATE: 58 BPM | DIASTOLIC BLOOD PRESSURE: 70 MMHG | BODY MASS INDEX: 21.77 KG/M2 | WEIGHT: 108 LBS | OXYGEN SATURATION: 99 %

## 2021-08-30 PROCEDURE — 99213 OFFICE O/P EST LOW 20 MIN: CPT

## 2021-08-30 NOTE — HISTORY OF PRESENT ILLNESS
[Dyspnea] : dyspnea [Exercise Intolerance] : exercise intolerance [Lightheadedness] : no lightheadedness [Palpitations] : no palpitations [Weakness] : no weakness [Orthopnea] : no orthopnea [Chest Pain] : no chest pain [Chest Tightness] : chest tightness [Cough] : no cough [Wheezing] : no wheezing [Weight Gain] : no weight gain

## 2021-08-30 NOTE — PHYSICAL EXAM
[No Acute Distress] : no acute distress [Normal Oropharynx] : normal oropharynx [Normal Appearance] : normal appearance [No Neck Mass] : no neck mass [Normal Rate/Rhythm] : normal rate/rhythm [Normal S1, S2] : normal s1, s2 [No Murmurs] : no murmurs [No Resp Distress] : no resp distress [Clear to Auscultation Bilaterally] : clear to auscultation bilaterally [No Abnormalities] : no abnormalities [No Clubbing] : no clubbing [No Cyanosis] : no cyanosis [No Edema] : no edema [FROM] : FROM [No Focal Deficits] : no focal deficits [Normal Color/ Pigmentation] : normal color/ pigmentation [Oriented x3] : oriented x3 [Normal Affect] : normal affect

## 2021-09-13 ENCOUNTER — APPOINTMENT (OUTPATIENT)
Dept: CARDIOTHORACIC SURGERY | Facility: CLINIC | Age: 71
End: 2021-09-13
Payer: MEDICARE

## 2021-09-13 ENCOUNTER — APPOINTMENT (OUTPATIENT)
Age: 71
End: 2021-09-13
Payer: MEDICARE

## 2021-09-13 ENCOUNTER — OUTPATIENT (OUTPATIENT)
Dept: OUTPATIENT SERVICES | Facility: HOSPITAL | Age: 71
LOS: 1 days | Discharge: HOME | End: 2021-09-13
Payer: MEDICARE

## 2021-09-13 VITALS
RESPIRATION RATE: 16 BRPM | SYSTOLIC BLOOD PRESSURE: 126 MMHG | OXYGEN SATURATION: 100 % | DIASTOLIC BLOOD PRESSURE: 82 MMHG | WEIGHT: 122 LBS | BODY MASS INDEX: 24.6 KG/M2 | HEIGHT: 59 IN | HEART RATE: 87 BPM

## 2021-09-13 VITALS
RESPIRATION RATE: 18 BRPM | TEMPERATURE: 98.5 F | SYSTOLIC BLOOD PRESSURE: 97 MMHG | DIASTOLIC BLOOD PRESSURE: 74 MMHG | WEIGHT: 94 LBS | HEART RATE: 90 BPM | HEIGHT: 59 IN | BODY MASS INDEX: 18.95 KG/M2 | OXYGEN SATURATION: 98 %

## 2021-09-13 DIAGNOSIS — Z90.49 ACQUIRED ABSENCE OF OTHER SPECIFIED PARTS OF DIGESTIVE TRACT: Chronic | ICD-10-CM

## 2021-09-13 DIAGNOSIS — Z98.890 OTHER SPECIFIED POSTPROCEDURAL STATES: Chronic | ICD-10-CM

## 2021-09-13 DIAGNOSIS — Z90.10 ACQUIRED ABSENCE OF UNSPECIFIED BREAST AND NIPPLE: Chronic | ICD-10-CM

## 2021-09-13 DIAGNOSIS — Z12.31 ENCOUNTER FOR SCREENING MAMMOGRAM FOR MALIGNANT NEOPLASM OF BREAST: ICD-10-CM

## 2021-09-13 PROCEDURE — 99213 OFFICE O/P EST LOW 20 MIN: CPT

## 2021-09-13 PROCEDURE — 77067 SCR MAMMO BI INCL CAD: CPT | Mod: 26,52,LT

## 2021-09-13 NOTE — PROCEDURE
[FreeTextEntry1] : EBONY GGO increased from 9 mm to 1.5cm\par and new nodules on right side 2mm and 4mm

## 2021-09-13 NOTE — PHYSICAL EXAM
[Sclera] : the sclera and conjunctiva were normal [Neck Appearance] : the appearance of the neck was normal [Respiration, Rhythm And Depth] : normal respiratory rhythm and effort [Exaggerated Use Of Accessory Muscles For Inspiration] : no accessory muscle use [Apical Impulse] : the apical impulse was normal [Heart Rate And Rhythm] : heart rate was normal and rhythm regular [Heart Sounds] : normal S1 and S2 [Examination Of The Chest] : the chest was normal in appearance [Bowel Sounds] : normal bowel sounds [Abdomen Soft] : soft [Abdomen Tenderness] : non-tender [Abnormal Walk] : normal gait [Nail Clubbing] : no clubbing  or cyanosis of the fingernails [Musculoskeletal - Swelling] : no joint swelling seen [Skin Turgor] : normal skin turgor [Skin Color & Pigmentation] : normal skin color and pigmentation [] : no rash [Cranial Nerves] : cranial nerves 2-12 were intact [Deep Tendon Reflexes (DTR)] : deep tendon reflexes were 2+ and symmetric [Sensation] : the sensory exam was normal to light touch and pinprick [Oriented To Time, Place, And Person] : oriented to person, place, and time [Impaired Insight] : insight and judgment were intact [Affect] : the affect was normal

## 2021-09-13 NOTE — HISTORY OF PRESENT ILLNESS
[Dyspnea] : dyspnea [Exercise Intolerance] : exercise intolerance [TextBox_4] : 71 year old female with HO Lung cancer SP surgery being followed for EBONY groundglass opacity. She had a recent CT scan with incrasing size of the GGO. [Lightheadedness] : no lightheadedness [Palpitations] : no palpitations [Weakness] : no weakness [Orthopnea] : no orthopnea [Chest Pain] : no chest pain [Chest Tightness] : no chest tightness [Cough] : no cough [Wheezing] : no wheezing [Weight Gain] : no weight gain

## 2021-09-13 NOTE — REASON FOR VISIT
[Follow-Up: _____] : a [unfilled] follow-up visit [FreeTextEntry1] : History of pt1cN0 Adenocarcinoma, now with enlarging GGO

## 2021-09-13 NOTE — HISTORY OF PRESENT ILLNESS
[FreeTextEntry1] : Ms. CARLOS OLEA is a 71 year F, former smoker, with history of pt1c N0 Adenocarcinoma S/P  RUL Lobectomy 2/2020 ([facility]) that arrives today after her surveillance CT Chest which showed an enlarging EBONY GGO (CT Chest @ [facility] on 9/10/2021).  Here today for review of CT.\par \par Had PFTs 3/2021 with Dr. Colvin which showed: FEV1 1.79 nd DLCO of 79%\par She is currently in her usual state of health and functions, denies worsening cough, SOB, hemoptysis, recent unintentional weight loss, recent illness\par Recieved Vaccination for COVID-19 \par Former Smoker 1 PPD X 40 years, quit ~2013\par ECOG 0\par Lives with her son\par Retired Psychiatric Social Worker\par \par Their Healthcare team is as follows:\par PMD: Vaibhav Guerrero\par Cardio: Jesus Marino\par Pulm: Carl Colvin.\par Hem/onc: Jorge Martínez (in the past)/ Dr. Rodriguez\par Surgical Oncology: Quintin Tate. \par \par

## 2021-09-13 NOTE — REVIEW OF SYSTEMS
[Negative] : Heme/Lymph [Fever] : no fever [Chills] : no chills [Feeling Poorly] : not feeling poorly [Feeling Tired] : not feeling tired [Heart Rate Is Slow] : the heart rate was not slow [Heart Rate Is Fast] : the heart rate was not fast [Chest Pain] : no chest pain [Palpitations] : no palpitations [Abdominal Pain] : no abdominal pain [Vomiting] : no vomiting [Constipation] : no constipation [Diarrhea] : no diarrhea

## 2021-09-13 NOTE — DATA REVIEWED
[FreeTextEntry1] : [facility] \par Narrative & Impression\par CT CHEST WITHOUT IV CONTRAST 9/10/2021\par   \par CLINICAL INDICATION: History of right lung cancer status post resection 2/2020. Right breast cancer status post right mastectomy 2000. 40 pack year smoking history.\par  \par COMPARISON:   PET/CT of 3/2021 and chest CT of 8/2020\par  \par TECHNIQUE: Noncontrast chest CT was performed.  Multiplanar CT and HRCT images were reviewed.\par  \par FINDINGS: \par  \par LUNGS, AIRWAYS: Trachea main and visualized segmental bronchi are patent. Right upper lobe resection.\par Minimal scarring in both apices.\par Linear appearing density right apex image 9 is stable and is related to scarring.\par Punctate nodule right upper lung image 12 is stable.\par Postsurgical changes noted in the medial right upper and mid lung field.\par There is a groundglass density in the left upper lobe in series 7 image 24. In the study of 8/2020 it measured 9 x 5 mm in the 1 mm thin sections. Currently it has INCREASED in size and measures 15 x 10 mm in the 1 mm thin sections. It is considered suspicious.\par There is a nodule in the right lower lobe measuring 4 mm. It is identified in series 7 image 20 and is measured in the 1 mm thin sections. It is a NEW finding.\par Nodule in the left upper lobe in series 7 image 34 is thought to likely represent a mucous plug. It is NEW It is better seen in the 1 mm thin sections.\par 2 mm nodule left lower lobe series 7 image 51. It is a NEW finding.\par  \par PLEURA: Pleural calcification on the right identified in series 4 image 126 to 127.. No pleural effusion.\par  \par MEDIASTINUM, JESUS, LYMPH NODES: Clips noted in the right axilla. No enlarged axillary adenopathy. No enlarged mediastinal or hilar nodes. No subcarinal adenopathy. Thoracic esophagus normal\par  \par HEART AND VESSELS: Heart size normal. No pericardial fluid. Mild coronary artery calcification. Ascending aorta 3.1 cm and descending aorta 2.4 cm both of which are normal\par  \par UPPER ABDOMEN: Pancreatic body cyst partially imaged. It measures 7 mm as previously.\par  \par BONES AND SOFT TISSUES: Bilateral breast implants. No aggressive bony lesion.\par  \par LOWER NECK: No abnormality\par  \par Electronic Signature: I personally reviewed the images and agree with this report. Final Report: Dictated by  and Signed by Attending Joleen Francis MD 9/10/2021 9:48 AM\par  \par IMPRESSION: \par  \par Groundglass density left upper lobe increased from 8/2020. It is considered suspicious. Sampling may BE considered. Negative PET/CT does not rule out low-grade malignancy.\par New nodule right lower lobe measuring 4 mm.\par 2 mm new nodule left lower lobe.\par Pulmonary metastatic disease not ruled out. Close follow-up recommended.\par

## 2021-09-13 NOTE — END OF VISIT
[] : Fellow [FreeTextEntry3] : patient seen and examined agree above note the result of ct scan discussed with patient and was referred to be seen by Ct surgery Dr Cohen today For  resection

## 2021-09-14 ENCOUNTER — APPOINTMENT (OUTPATIENT)
Dept: CARDIOLOGY | Facility: CLINIC | Age: 71
End: 2021-09-14

## 2021-09-14 ENCOUNTER — NON-APPOINTMENT (OUTPATIENT)
Age: 71
End: 2021-09-14

## 2021-09-15 ENCOUNTER — APPOINTMENT (OUTPATIENT)
Dept: CARDIOLOGY | Facility: CLINIC | Age: 71
End: 2021-09-15
Payer: MEDICARE

## 2021-09-15 ENCOUNTER — OUTPATIENT (OUTPATIENT)
Dept: OUTPATIENT SERVICES | Facility: HOSPITAL | Age: 71
LOS: 1 days | Discharge: HOME | End: 2021-09-15
Payer: MEDICARE

## 2021-09-15 DIAGNOSIS — Z98.890 OTHER SPECIFIED POSTPROCEDURAL STATES: Chronic | ICD-10-CM

## 2021-09-15 DIAGNOSIS — Z85.3 PERSONAL HISTORY OF MALIGNANT NEOPLASM OF BREAST: ICD-10-CM

## 2021-09-15 DIAGNOSIS — Z90.10 ACQUIRED ABSENCE OF UNSPECIFIED BREAST AND NIPPLE: Chronic | ICD-10-CM

## 2021-09-15 DIAGNOSIS — Z90.49 ACQUIRED ABSENCE OF OTHER SPECIFIED PARTS OF DIGESTIVE TRACT: Chronic | ICD-10-CM

## 2021-09-15 PROCEDURE — 93351 STRESS TTE COMPLETE: CPT

## 2021-09-15 PROCEDURE — 93325 DOPPLER ECHO COLOR FLOW MAPG: CPT

## 2021-09-15 PROCEDURE — 76641 ULTRASOUND BREAST COMPLETE: CPT | Mod: 26,LT

## 2021-09-15 PROCEDURE — 93320 DOPPLER ECHO COMPLETE: CPT

## 2021-09-16 ENCOUNTER — RESULT REVIEW (OUTPATIENT)
Age: 71
End: 2021-09-16

## 2021-09-16 ENCOUNTER — OUTPATIENT (OUTPATIENT)
Dept: OUTPATIENT SERVICES | Facility: HOSPITAL | Age: 71
LOS: 1 days | Discharge: HOME | End: 2021-09-16
Payer: MEDICARE

## 2021-09-16 VITALS
OXYGEN SATURATION: 100 % | DIASTOLIC BLOOD PRESSURE: 66 MMHG | RESPIRATION RATE: 14 BRPM | TEMPERATURE: 96 F | HEIGHT: 59 IN | WEIGHT: 93.92 LBS | SYSTOLIC BLOOD PRESSURE: 135 MMHG | HEART RATE: 60 BPM

## 2021-09-16 DIAGNOSIS — Z90.10 ACQUIRED ABSENCE OF UNSPECIFIED BREAST AND NIPPLE: Chronic | ICD-10-CM

## 2021-09-16 DIAGNOSIS — R91.1 SOLITARY PULMONARY NODULE: ICD-10-CM

## 2021-09-16 DIAGNOSIS — Z90.49 ACQUIRED ABSENCE OF OTHER SPECIFIED PARTS OF DIGESTIVE TRACT: Chronic | ICD-10-CM

## 2021-09-16 DIAGNOSIS — Z01.818 ENCOUNTER FOR OTHER PREPROCEDURAL EXAMINATION: ICD-10-CM

## 2021-09-16 DIAGNOSIS — Z90.2 ACQUIRED ABSENCE OF LUNG [PART OF]: Chronic | ICD-10-CM

## 2021-09-16 DIAGNOSIS — Z98.890 OTHER SPECIFIED POSTPROCEDURAL STATES: Chronic | ICD-10-CM

## 2021-09-16 DIAGNOSIS — Z98.82 BREAST IMPLANT STATUS: Chronic | ICD-10-CM

## 2021-09-16 LAB
APPEARANCE UR: CLEAR — SIGNIFICANT CHANGE UP
BASOPHILS # BLD AUTO: 0.07 K/UL — SIGNIFICANT CHANGE UP (ref 0–0.2)
BASOPHILS NFR BLD AUTO: 1.2 % — HIGH (ref 0–1)
BILIRUB UR-MCNC: NEGATIVE — SIGNIFICANT CHANGE UP
BLD GP AB SCN SERPL QL: SIGNIFICANT CHANGE UP
COLOR SPEC: SIGNIFICANT CHANGE UP
DIFF PNL FLD: NEGATIVE — SIGNIFICANT CHANGE UP
EOSINOPHIL # BLD AUTO: 0.12 K/UL — SIGNIFICANT CHANGE UP (ref 0–0.7)
EOSINOPHIL NFR BLD AUTO: 2 % — SIGNIFICANT CHANGE UP (ref 0–8)
GLUCOSE UR QL: NEGATIVE — SIGNIFICANT CHANGE UP
HCT VFR BLD CALC: 43 % — SIGNIFICANT CHANGE UP (ref 37–47)
HGB BLD-MCNC: 14.1 G/DL — SIGNIFICANT CHANGE UP (ref 12–16)
IMM GRANULOCYTES NFR BLD AUTO: 0.2 % — SIGNIFICANT CHANGE UP (ref 0.1–0.3)
KETONES UR-MCNC: NEGATIVE — SIGNIFICANT CHANGE UP
LEUKOCYTE ESTERASE UR-ACNC: NEGATIVE — SIGNIFICANT CHANGE UP
LYMPHOCYTES # BLD AUTO: 1.84 K/UL — SIGNIFICANT CHANGE UP (ref 1.2–3.4)
LYMPHOCYTES # BLD AUTO: 31.2 % — SIGNIFICANT CHANGE UP (ref 20.5–51.1)
MCHC RBC-ENTMCNC: 31.8 PG — HIGH (ref 27–31)
MCHC RBC-ENTMCNC: 32.8 G/DL — SIGNIFICANT CHANGE UP (ref 32–37)
MCV RBC AUTO: 96.8 FL — SIGNIFICANT CHANGE UP (ref 81–99)
MONOCYTES # BLD AUTO: 0.43 K/UL — SIGNIFICANT CHANGE UP (ref 0.1–0.6)
MONOCYTES NFR BLD AUTO: 7.3 % — SIGNIFICANT CHANGE UP (ref 1.7–9.3)
NEUTROPHILS # BLD AUTO: 3.42 K/UL — SIGNIFICANT CHANGE UP (ref 1.4–6.5)
NEUTROPHILS NFR BLD AUTO: 58.1 % — SIGNIFICANT CHANGE UP (ref 42.2–75.2)
NITRITE UR-MCNC: NEGATIVE — SIGNIFICANT CHANGE UP
NRBC # BLD: 0 /100 WBCS — SIGNIFICANT CHANGE UP (ref 0–0)
PH UR: 7 — SIGNIFICANT CHANGE UP (ref 5–8)
PLATELET # BLD AUTO: 238 K/UL — SIGNIFICANT CHANGE UP (ref 130–400)
PROT UR-MCNC: NEGATIVE — SIGNIFICANT CHANGE UP
RBC # BLD: 4.44 M/UL — SIGNIFICANT CHANGE UP (ref 4.2–5.4)
RBC # FLD: 11.8 % — SIGNIFICANT CHANGE UP (ref 11.5–14.5)
SP GR SPEC: 1.01 — SIGNIFICANT CHANGE UP (ref 1.01–1.03)
UROBILINOGEN FLD QL: SIGNIFICANT CHANGE UP
WBC # BLD: 5.89 K/UL — SIGNIFICANT CHANGE UP (ref 4.8–10.8)
WBC # FLD AUTO: 5.89 K/UL — SIGNIFICANT CHANGE UP (ref 4.8–10.8)

## 2021-09-16 PROCEDURE — 71046 X-RAY EXAM CHEST 2 VIEWS: CPT | Mod: 26

## 2021-09-16 PROCEDURE — 93010 ELECTROCARDIOGRAM REPORT: CPT

## 2021-09-16 RX ORDER — SENNOSIDES/DOCUSATE SODIUM 8.6MG-50MG
5 TABLET ORAL
Qty: 0 | Refills: 0 | DISCHARGE

## 2021-09-16 RX ORDER — OMEGA-3 ACID ETHYL ESTERS 1 G
0 CAPSULE ORAL
Qty: 0 | Refills: 0 | DISCHARGE

## 2021-09-16 RX ORDER — OMEPRAZOLE 10 MG/1
1 CAPSULE, DELAYED RELEASE ORAL
Qty: 0 | Refills: 0 | DISCHARGE

## 2021-09-16 RX ORDER — UBIDECARENONE 100 MG
1 CAPSULE ORAL
Qty: 0 | Refills: 0 | DISCHARGE

## 2021-09-16 NOTE — H&P PST ADULT - HISTORY OF PRESENT ILLNESS
71y Female presents today for presurgical testing for bronchoscopy, robotic assisted left thoracoscopy upper lobe pulmonary wedge resection, possible lingulectomy, possible lobectomy, mediastinal lymph node dissection possible open. Patient states that during routine oncologic screening follow up testing she was informed that the EBONY ground glass opacity has notably increased in size and that malignancy is not ruled out according to the radiologic testing. She states   that she was advised that surgical removal of the lesion would be optimal. She denies any symptoms of cough, hemoptysis, unintentional weight loss, or loss of appetite.   Patient denies any CP, palpitations, SOB, cough, or dysuria. No recent URI or UTI.   Stated exercise tolerance is FOS 1          CECIL screen reviewed    Patient denies any recent personal exposure to COVID19. Denies any sick contacts. Patient denies any travel within the past 30 days. Patient was instructed to quarantine until after procedure  +Moderna covid19 vaccine completed 3/2/2021 x2 doses    R91.1 /  ,11778, 28348 ,97498, 09053  Solitary pulmonary nodule  Encounter for other preprocedural examination  ^R91.1 /  ,08710, 50077 ,23809, 29055    PMH/PSH/FH  Solitary pulmonary nodule  SOB (shortness of breath)  Murmur  Constipation  Breast cancer  History of mastectomy  History of appendectomy  S/P breast reconstruction, right    Anesthesia Alert  YES--Difficult Airway - UNABLE TO OPEN MOUTH FULLY  NO--History of neck surgery or radiation  NO--Limited ROM of neck  NO--History of Malignant hyperthermia  NO--Personal or family history of Pseudocholinesterase deficiency.  NO--Prior Anesthesia Complication  NO--Latex Allergy  NO--Loose teeth  NO--History of Rheumatoid Arthritis  NO--CECIL  NO--Bleeding risk  NO--Other_____    Patient states that this is their complete medical history and list of medications

## 2021-09-16 NOTE — H&P PST ADULT - NSICDXPASTSURGICALHX_GEN_ALL_CORE_FT
PAST SURGICAL HISTORY:  H/O bilateral breast implants     History of appendectomy     History of lobectomy of lung 2/7/2020: right upper lobe wedge and right upper lobe completion lobectomy of levels 9, 11, 4, 7, and 12 lymph nodes    History of lung surgery     History of mastectomy right    S/P breast reconstruction, right

## 2021-09-16 NOTE — H&P PST ADULT - NSICDXFAMILYHX_GEN_ALL_CORE_FT
FAMILY HISTORY:  FH: colon cancer    Mother  Still living? Unknown  FH: breast cancer, Age at diagnosis: Age Unknown    Sibling  Still living? Unknown  FH: multiple myeloma, Age at diagnosis: Age Unknown

## 2021-09-16 NOTE — H&P PST ADULT - ANESTHESIA, PREVIOUS REACTION, PROFILE
LEFT MESSAGE WITH [facility] CARDIOLOGY OFFICE TO HAVE DR. JAQUEZ RETURN CALL WHEN HE IS BACK INTO OFFICE. HE IS OUT THIS WEEK AND NEXT. THEY HAVE SENT MESSAGE BACK.
none

## 2021-09-16 NOTE — H&P PST ADULT - NSICDXPASTMEDICALHX_GEN_ALL_CORE_FT
PAST MEDICAL HISTORY:  Breast cancer right    Constipation     High cholesterol     IPMN (intraductal papillary mucinous neoplasm)     Lung cancer     Murmur     Neuropathic pain     Overactive bladder     Pancreatic cyst     SOB (shortness of breath) with mod activity

## 2021-09-16 NOTE — H&P PST ADULT - VENOUS THROMBOEMBOLISM
Patient remains inpatient at [facility].  Will contact patient tomorrow to complete Warfarin education.  Welcome packet mailed.     no

## 2021-09-17 LAB
ALBUMIN SERPL ELPH-MCNC: 4.8 G/DL — SIGNIFICANT CHANGE UP (ref 3.5–5.2)
ALP SERPL-CCNC: 61 U/L — SIGNIFICANT CHANGE UP (ref 30–115)
ALT FLD-CCNC: 23 U/L — SIGNIFICANT CHANGE UP (ref 0–41)
ANION GAP SERPL CALC-SCNC: 12 MMOL/L — SIGNIFICANT CHANGE UP (ref 7–14)
APTT BLD: 31.8 SEC — SIGNIFICANT CHANGE UP (ref 27–39.2)
AST SERPL-CCNC: 25 U/L — SIGNIFICANT CHANGE UP (ref 0–41)
BILIRUB SERPL-MCNC: 0.8 MG/DL — SIGNIFICANT CHANGE UP (ref 0.2–1.2)
BUN SERPL-MCNC: 18 MG/DL — SIGNIFICANT CHANGE UP (ref 10–20)
CALCIUM SERPL-MCNC: 9.8 MG/DL — SIGNIFICANT CHANGE UP (ref 8.5–10.1)
CHLORIDE SERPL-SCNC: 104 MMOL/L — SIGNIFICANT CHANGE UP (ref 98–110)
CO2 SERPL-SCNC: 27 MMOL/L — SIGNIFICANT CHANGE UP (ref 17–32)
CREAT SERPL-MCNC: 0.9 MG/DL — SIGNIFICANT CHANGE UP (ref 0.7–1.5)
GLUCOSE SERPL-MCNC: 72 MG/DL — SIGNIFICANT CHANGE UP (ref 70–99)
INR BLD: 0.99 RATIO — SIGNIFICANT CHANGE UP (ref 0.65–1.3)
POTASSIUM SERPL-MCNC: 5.2 MMOL/L — HIGH (ref 3.5–5)
POTASSIUM SERPL-SCNC: 5.2 MMOL/L — HIGH (ref 3.5–5)
PROT SERPL-MCNC: 7.2 G/DL — SIGNIFICANT CHANGE UP (ref 6–8)
PROTHROM AB SERPL-ACNC: 11.4 SEC — SIGNIFICANT CHANGE UP (ref 9.95–12.87)
SODIUM SERPL-SCNC: 143 MMOL/L — SIGNIFICANT CHANGE UP (ref 135–146)

## 2021-09-22 ENCOUNTER — APPOINTMENT (OUTPATIENT)
Dept: CARDIOLOGY | Facility: CLINIC | Age: 71
End: 2021-09-22
Payer: MEDICARE

## 2021-09-22 VITALS
SYSTOLIC BLOOD PRESSURE: 100 MMHG | HEIGHT: 59 IN | TEMPERATURE: 97.6 F | HEART RATE: 66 BPM | DIASTOLIC BLOOD PRESSURE: 60 MMHG | BODY MASS INDEX: 19.15 KG/M2 | WEIGHT: 95 LBS

## 2021-09-22 PROCEDURE — 99214 OFFICE O/P EST MOD 30 MIN: CPT

## 2021-09-22 PROCEDURE — 93000 ELECTROCARDIOGRAM COMPLETE: CPT

## 2021-09-22 RX ORDER — CALCIUM CARBONATE/VITAMIN D3 600MG-5MCG
TABLET ORAL DAILY
Refills: 0 | Status: ACTIVE | COMMUNITY

## 2021-09-22 NOTE — ASSESSMENT
[FreeTextEntry1] : The patient is now going for wedge resection of the EBONY . She has simlar provedure on the RUL . The patient has her baseline BEASLEY . ETT echo was negative for ischemia at moderate exercise load last week. She has mnore than 4 METS exercise tolerance. She is an intermediate cardiac risk undergoing an intermediate suma procedure .

## 2021-09-22 NOTE — CARDIOLOGY SUMMARY
[___] : [unfilled] [de-identified] : 9- NSR NS T wave change.  [de-identified] : 9- ETT Echo completed Stage II No ischedmia  Moderate MR mild TR RVSP was noermal

## 2021-09-22 NOTE — HISTORY OF PRESENT ILLNESS
[FreeTextEntry1] : The patient has a new EBONY lung lesion . Now going for VATS and EBONY wedge resection next week. The patient has had no chest pain . Some BEASLEY when going up steps . Last week the pateint has a stress echo . She completed stage II No ischemia .

## 2021-09-22 NOTE — PHYSICAL EXAM
[General Appearance - Well Developed] : well developed [Normal Appearance] : normal appearance [Well Groomed] : well groomed [General Appearance - Well Nourished] : well nourished [No Deformities] : no deformities [General Appearance - In No Acute Distress] : no acute distress [Normal Conjunctiva] : the conjunctiva exhibited no abnormalities [Eyelids - No Xanthelasma] : the eyelids demonstrated no xanthelasmas [Normal Oral Mucosa] : normal oral mucosa [No Oral Pallor] : no oral pallor [No Oral Cyanosis] : no oral cyanosis [Normal Jugular Venous A Waves Present] : normal jugular venous A waves present [Normal Jugular Venous V Waves Present] : normal jugular venous V waves present [No Jugular Venous Vizcarra A Waves] : no jugular venous vizcarra A waves [Respiration, Rhythm And Depth] : normal respiratory rhythm and effort [Exaggerated Use Of Accessory Muscles For Inspiration] : no accessory muscle use [Abdomen Soft] : soft [Auscultation Breath Sounds / Voice Sounds] : lungs were clear to auscultation bilaterally [Abdomen Tenderness] : non-tender [Abdomen Mass (___ Cm)] : no abdominal mass palpated [Abnormal Walk] : normal gait [Gait - Sufficient For Exercise Testing] : the gait was sufficient for exercise testing [Nail Clubbing] : no clubbing of the fingernails [Cyanosis, Localized] : no localized cyanosis [Petechial Hemorrhages (___cm)] : no petechial hemorrhages [Skin Color & Pigmentation] : normal skin color and pigmentation [] : no rash [No Venous Stasis] : no venous stasis [Skin Lesions] : no skin lesions [No Skin Ulcers] : no skin ulcer [No Xanthoma] : no  xanthoma was observed [Oriented To Time, Place, And Person] : oriented to person, place, and time [Affect] : the affect was normal [Mood] : the mood was normal [No Anxiety] : not feeling anxious [Normal Rate] : normal [Rhythm Regular] : regular [No Murmur] : no murmurs heard [1+] : left 1+ [No Pitting Edema] : no pitting edema present [Rt] : no varicose veins of the right leg [Lt] : no varicose veins of the left leg

## 2021-09-24 ENCOUNTER — OUTPATIENT (OUTPATIENT)
Dept: OUTPATIENT SERVICES | Facility: HOSPITAL | Age: 71
LOS: 1 days | Discharge: HOME | End: 2021-09-24

## 2021-09-24 ENCOUNTER — LABORATORY RESULT (OUTPATIENT)
Age: 71
End: 2021-09-24

## 2021-09-24 DIAGNOSIS — Z90.2 ACQUIRED ABSENCE OF LUNG [PART OF]: Chronic | ICD-10-CM

## 2021-09-24 DIAGNOSIS — Z11.59 ENCOUNTER FOR SCREENING FOR OTHER VIRAL DISEASES: ICD-10-CM

## 2021-09-24 DIAGNOSIS — Z98.890 OTHER SPECIFIED POSTPROCEDURAL STATES: Chronic | ICD-10-CM

## 2021-09-24 DIAGNOSIS — Z90.49 ACQUIRED ABSENCE OF OTHER SPECIFIED PARTS OF DIGESTIVE TRACT: Chronic | ICD-10-CM

## 2021-09-24 DIAGNOSIS — Z90.10 ACQUIRED ABSENCE OF UNSPECIFIED BREAST AND NIPPLE: Chronic | ICD-10-CM

## 2021-09-24 DIAGNOSIS — Z98.82 BREAST IMPLANT STATUS: Chronic | ICD-10-CM

## 2021-09-24 PROBLEM — K86.2 CYST OF PANCREAS: Chronic | Status: ACTIVE | Noted: 2021-09-16

## 2021-09-24 PROBLEM — R06.02 SHORTNESS OF BREATH: Chronic | Status: ACTIVE | Noted: 2020-01-27

## 2021-09-24 PROBLEM — E78.00 PURE HYPERCHOLESTEROLEMIA, UNSPECIFIED: Chronic | Status: ACTIVE | Noted: 2021-09-16

## 2021-09-24 PROBLEM — C34.90 MALIGNANT NEOPLASM OF UNSPECIFIED PART OF UNSPECIFIED BRONCHUS OR LUNG: Chronic | Status: ACTIVE | Noted: 2021-09-16

## 2021-09-24 PROBLEM — D49.0 NEOPLASM OF UNSPECIFIED BEHAVIOR OF DIGESTIVE SYSTEM: Chronic | Status: ACTIVE | Noted: 2021-09-16

## 2021-09-24 PROBLEM — N32.81 OVERACTIVE BLADDER: Chronic | Status: ACTIVE | Noted: 2021-09-16

## 2021-09-24 PROBLEM — M79.2 NEURALGIA AND NEURITIS, UNSPECIFIED: Chronic | Status: ACTIVE | Noted: 2021-09-16

## 2021-09-27 ENCOUNTER — RESULT REVIEW (OUTPATIENT)
Age: 71
End: 2021-09-27

## 2021-09-27 ENCOUNTER — TRANSCRIPTION ENCOUNTER (OUTPATIENT)
Age: 71
End: 2021-09-27

## 2021-09-27 ENCOUNTER — INPATIENT (INPATIENT)
Facility: HOSPITAL | Age: 71
LOS: 1 days | Discharge: HOME | End: 2021-09-29
Attending: THORACIC SURGERY (CARDIOTHORACIC VASCULAR SURGERY) | Admitting: THORACIC SURGERY (CARDIOTHORACIC VASCULAR SURGERY)
Payer: MEDICARE

## 2021-09-27 ENCOUNTER — APPOINTMENT (OUTPATIENT)
Dept: CARDIOTHORACIC SURGERY | Facility: HOSPITAL | Age: 71
End: 2021-09-27

## 2021-09-27 VITALS
TEMPERATURE: 97 F | RESPIRATION RATE: 16 BRPM | OXYGEN SATURATION: 100 % | SYSTOLIC BLOOD PRESSURE: 96 MMHG | WEIGHT: 95.02 LBS | DIASTOLIC BLOOD PRESSURE: 65 MMHG | HEIGHT: 59 IN | HEART RATE: 73 BPM

## 2021-09-27 DIAGNOSIS — Z90.10 ACQUIRED ABSENCE OF UNSPECIFIED BREAST AND NIPPLE: Chronic | ICD-10-CM

## 2021-09-27 DIAGNOSIS — Z90.49 ACQUIRED ABSENCE OF OTHER SPECIFIED PARTS OF DIGESTIVE TRACT: Chronic | ICD-10-CM

## 2021-09-27 DIAGNOSIS — Z98.890 OTHER SPECIFIED POSTPROCEDURAL STATES: Chronic | ICD-10-CM

## 2021-09-27 DIAGNOSIS — Z90.2 ACQUIRED ABSENCE OF LUNG [PART OF]: Chronic | ICD-10-CM

## 2021-09-27 DIAGNOSIS — Z98.82 BREAST IMPLANT STATUS: Chronic | ICD-10-CM

## 2021-09-27 LAB
ALBUMIN SERPL ELPH-MCNC: 3.6 G/DL — SIGNIFICANT CHANGE UP (ref 3.5–5.2)
ALP SERPL-CCNC: 41 U/L — SIGNIFICANT CHANGE UP (ref 30–115)
ALT FLD-CCNC: 32 U/L — SIGNIFICANT CHANGE UP (ref 0–41)
ANION GAP SERPL CALC-SCNC: 12 MMOL/L — SIGNIFICANT CHANGE UP (ref 7–14)
APTT BLD: 26.1 SEC — LOW (ref 27–39.2)
AST SERPL-CCNC: 34 U/L — SIGNIFICANT CHANGE UP (ref 0–41)
BASOPHILS # BLD AUTO: 0.02 K/UL — SIGNIFICANT CHANGE UP (ref 0–0.2)
BASOPHILS NFR BLD AUTO: 0.2 % — SIGNIFICANT CHANGE UP (ref 0–1)
BILIRUB SERPL-MCNC: 0.5 MG/DL — SIGNIFICANT CHANGE UP (ref 0.2–1.2)
BUN SERPL-MCNC: 13 MG/DL — SIGNIFICANT CHANGE UP (ref 10–20)
CALCIUM SERPL-MCNC: 7.8 MG/DL — LOW (ref 8.5–10.1)
CHLORIDE SERPL-SCNC: 107 MMOL/L — SIGNIFICANT CHANGE UP (ref 98–110)
CO2 SERPL-SCNC: 19 MMOL/L — SIGNIFICANT CHANGE UP (ref 17–32)
CREAT SERPL-MCNC: 0.8 MG/DL — SIGNIFICANT CHANGE UP (ref 0.7–1.5)
EOSINOPHIL # BLD AUTO: 0 K/UL — SIGNIFICANT CHANGE UP (ref 0–0.7)
EOSINOPHIL NFR BLD AUTO: 0 % — SIGNIFICANT CHANGE UP (ref 0–8)
GLUCOSE SERPL-MCNC: 180 MG/DL — HIGH (ref 70–99)
HCT VFR BLD CALC: 35.2 % — LOW (ref 37–47)
HGB BLD-MCNC: 12.1 G/DL — SIGNIFICANT CHANGE UP (ref 12–16)
IMM GRANULOCYTES NFR BLD AUTO: 0.3 % — SIGNIFICANT CHANGE UP (ref 0.1–0.3)
INR BLD: 1.05 RATIO — SIGNIFICANT CHANGE UP (ref 0.65–1.3)
LYMPHOCYTES # BLD AUTO: 0.38 K/UL — LOW (ref 1.2–3.4)
LYMPHOCYTES # BLD AUTO: 3 % — LOW (ref 20.5–51.1)
MCHC RBC-ENTMCNC: 32.6 PG — HIGH (ref 27–31)
MCHC RBC-ENTMCNC: 34.4 G/DL — SIGNIFICANT CHANGE UP (ref 32–37)
MCV RBC AUTO: 94.9 FL — SIGNIFICANT CHANGE UP (ref 81–99)
MONOCYTES # BLD AUTO: 0.88 K/UL — HIGH (ref 0.1–0.6)
MONOCYTES NFR BLD AUTO: 7 % — SIGNIFICANT CHANGE UP (ref 1.7–9.3)
NEUTROPHILS # BLD AUTO: 11.27 K/UL — HIGH (ref 1.4–6.5)
NEUTROPHILS NFR BLD AUTO: 89.5 % — HIGH (ref 42.2–75.2)
NRBC # BLD: 0 /100 WBCS — SIGNIFICANT CHANGE UP (ref 0–0)
PLATELET # BLD AUTO: 165 K/UL — SIGNIFICANT CHANGE UP (ref 130–400)
POTASSIUM SERPL-MCNC: 4 MMOL/L — SIGNIFICANT CHANGE UP (ref 3.5–5)
POTASSIUM SERPL-SCNC: 4 MMOL/L — SIGNIFICANT CHANGE UP (ref 3.5–5)
PROT SERPL-MCNC: 5.2 G/DL — LOW (ref 6–8)
PROTHROM AB SERPL-ACNC: 12.1 SEC — SIGNIFICANT CHANGE UP (ref 9.95–12.87)
RBC # BLD: 3.71 M/UL — LOW (ref 4.2–5.4)
RBC # FLD: 12 % — SIGNIFICANT CHANGE UP (ref 11.5–14.5)
SODIUM SERPL-SCNC: 138 MMOL/L — SIGNIFICANT CHANGE UP (ref 135–146)
WBC # BLD: 12.59 K/UL — HIGH (ref 4.8–10.8)
WBC # FLD AUTO: 12.59 K/UL — HIGH (ref 4.8–10.8)

## 2021-09-27 PROCEDURE — 32669 THORACOSCOPY REMOVE SEGMENT: CPT | Mod: LT

## 2021-09-27 PROCEDURE — 88307 TISSUE EXAM BY PATHOLOGIST: CPT | Mod: 26

## 2021-09-27 PROCEDURE — 32674 THORACOSCOPY LYMPH NODE EXC: CPT

## 2021-09-27 PROCEDURE — 32668 THORACOSCOPY W/W RESECT DIAG: CPT

## 2021-09-27 PROCEDURE — 88305 TISSUE EXAM BY PATHOLOGIST: CPT | Mod: 26

## 2021-09-27 PROCEDURE — S2900 ROBOTIC SURGICAL SYSTEM: CPT | Mod: NC

## 2021-09-27 PROCEDURE — 32674 THORACOSCOPY LYMPH NODE EXC: CPT | Mod: AS

## 2021-09-27 PROCEDURE — 32669 THORACOSCOPY REMOVE SEGMENT: CPT | Mod: AS,LT

## 2021-09-27 PROCEDURE — 32668 THORACOSCOPY W/W RESECT DIAG: CPT | Mod: AS

## 2021-09-27 PROCEDURE — 71045 X-RAY EXAM CHEST 1 VIEW: CPT | Mod: 26

## 2021-09-27 RX ORDER — KETOROLAC TROMETHAMINE 30 MG/ML
30 SYRINGE (ML) INJECTION ONCE
Refills: 0 | Status: DISCONTINUED | OUTPATIENT
Start: 2021-09-27 | End: 2021-09-27

## 2021-09-27 RX ORDER — MORPHINE SULFATE 50 MG/1
4 CAPSULE, EXTENDED RELEASE ORAL
Refills: 0 | Status: DISCONTINUED | OUTPATIENT
Start: 2021-09-27 | End: 2021-09-29

## 2021-09-27 RX ORDER — IPRATROPIUM/ALBUTEROL SULFATE 18-103MCG
3 AEROSOL WITH ADAPTER (GRAM) INHALATION EVERY 6 HOURS
Refills: 0 | Status: DISCONTINUED | OUTPATIENT
Start: 2021-09-27 | End: 2021-09-29

## 2021-09-27 RX ORDER — SENNA PLUS 8.6 MG/1
2 TABLET ORAL AT BEDTIME
Refills: 0 | Status: DISCONTINUED | OUTPATIENT
Start: 2021-09-28 | End: 2021-09-29

## 2021-09-27 RX ORDER — CHLORHEXIDINE GLUCONATE 213 G/1000ML
1 SOLUTION TOPICAL DAILY
Refills: 0 | Status: DISCONTINUED | OUTPATIENT
Start: 2021-09-27 | End: 2021-09-29

## 2021-09-27 RX ORDER — MORPHINE SULFATE 50 MG/1
2 CAPSULE, EXTENDED RELEASE ORAL
Refills: 0 | Status: DISCONTINUED | OUTPATIENT
Start: 2021-09-27 | End: 2021-09-29

## 2021-09-27 RX ORDER — BUPIVACAINE 13.3 MG/ML
20 INJECTION, SUSPENSION, LIPOSOMAL INFILTRATION ONCE
Refills: 0 | Status: DISCONTINUED | OUTPATIENT
Start: 2021-09-27 | End: 2021-09-29

## 2021-09-27 RX ORDER — SODIUM CHLORIDE 9 MG/ML
1000 INJECTION INTRAMUSCULAR; INTRAVENOUS; SUBCUTANEOUS
Refills: 0 | Status: DISCONTINUED | OUTPATIENT
Start: 2021-09-27 | End: 2021-09-29

## 2021-09-27 RX ORDER — HYDROMORPHONE HYDROCHLORIDE 2 MG/ML
30 INJECTION INTRAMUSCULAR; INTRAVENOUS; SUBCUTANEOUS
Refills: 0 | Status: DISCONTINUED | OUTPATIENT
Start: 2021-09-27 | End: 2021-09-29

## 2021-09-27 RX ORDER — MAGNESIUM OXIDE 400 MG ORAL TABLET 241.3 MG
400 TABLET ORAL DAILY
Refills: 0 | Status: DISCONTINUED | OUTPATIENT
Start: 2021-09-27 | End: 2021-09-29

## 2021-09-27 RX ORDER — METOPROLOL TARTRATE 50 MG
5 TABLET ORAL EVERY 6 HOURS
Refills: 0 | Status: DISCONTINUED | OUTPATIENT
Start: 2021-09-27 | End: 2021-09-28

## 2021-09-27 RX ORDER — FLUTICASONE PROPIONATE 50 MCG
1 SPRAY, SUSPENSION NASAL DAILY
Refills: 0 | Status: DISCONTINUED | OUTPATIENT
Start: 2021-09-27 | End: 2021-09-29

## 2021-09-27 RX ORDER — ONDANSETRON 8 MG/1
4 TABLET, FILM COATED ORAL EVERY 6 HOURS
Refills: 0 | Status: DISCONTINUED | OUTPATIENT
Start: 2021-09-27 | End: 2021-09-29

## 2021-09-27 RX ORDER — INDOCYANINE GREEN 25 MG
15 KIT INTRAVASCULAR; INTRAVENOUS ONCE
Refills: 0 | Status: DISCONTINUED | OUTPATIENT
Start: 2021-09-27 | End: 2021-09-27

## 2021-09-27 RX ORDER — SODIUM CHLORIDE 9 MG/ML
1000 INJECTION, SOLUTION INTRAVENOUS
Refills: 0 | Status: DISCONTINUED | OUTPATIENT
Start: 2021-09-27 | End: 2021-09-27

## 2021-09-27 RX ORDER — HEPARIN SODIUM 5000 [USP'U]/ML
5000 INJECTION INTRAVENOUS; SUBCUTANEOUS EVERY 8 HOURS
Refills: 0 | Status: DISCONTINUED | OUTPATIENT
Start: 2021-09-27 | End: 2021-09-29

## 2021-09-27 RX ORDER — OXYBUTYNIN CHLORIDE 5 MG
5 TABLET ORAL
Refills: 0 | Status: DISCONTINUED | OUTPATIENT
Start: 2021-09-27 | End: 2021-09-29

## 2021-09-27 RX ORDER — ACETAMINOPHEN 500 MG
1000 TABLET ORAL ONCE
Refills: 0 | Status: COMPLETED | OUTPATIENT
Start: 2021-09-28 | End: 2021-09-28

## 2021-09-27 RX ORDER — ONDANSETRON 8 MG/1
4 TABLET, FILM COATED ORAL ONCE
Refills: 0 | Status: DISCONTINUED | OUTPATIENT
Start: 2021-09-27 | End: 2021-09-29

## 2021-09-27 RX ORDER — POLYETHYLENE GLYCOL 3350 17 G/17G
17 POWDER, FOR SOLUTION ORAL DAILY
Refills: 0 | Status: DISCONTINUED | OUTPATIENT
Start: 2021-09-28 | End: 2021-09-29

## 2021-09-27 RX ORDER — NALOXONE HYDROCHLORIDE 4 MG/.1ML
0.1 SPRAY NASAL
Refills: 0 | Status: DISCONTINUED | OUTPATIENT
Start: 2021-09-27 | End: 2021-09-29

## 2021-09-27 RX ORDER — SODIUM CHLORIDE 9 MG/ML
1000 INJECTION, SOLUTION INTRAVENOUS
Refills: 0 | Status: DISCONTINUED | OUTPATIENT
Start: 2021-09-27 | End: 2021-09-29

## 2021-09-27 RX ORDER — FAMOTIDINE 10 MG/ML
20 INJECTION INTRAVENOUS
Refills: 0 | Status: DISCONTINUED | OUTPATIENT
Start: 2021-09-27 | End: 2021-09-29

## 2021-09-27 RX ORDER — HYDRALAZINE HCL 50 MG
10 TABLET ORAL EVERY 4 HOURS
Refills: 0 | Status: DISCONTINUED | OUTPATIENT
Start: 2021-09-27 | End: 2021-09-29

## 2021-09-27 RX ORDER — HYDROMORPHONE HYDROCHLORIDE 2 MG/ML
30 INJECTION INTRAMUSCULAR; INTRAVENOUS; SUBCUTANEOUS
Refills: 0 | Status: DISCONTINUED | OUTPATIENT
Start: 2021-09-27 | End: 2021-09-28

## 2021-09-27 RX ORDER — ATORVASTATIN CALCIUM 80 MG/1
10 TABLET, FILM COATED ORAL AT BEDTIME
Refills: 0 | Status: DISCONTINUED | OUTPATIENT
Start: 2021-09-27 | End: 2021-09-29

## 2021-09-27 RX ORDER — CEFAZOLIN SODIUM 1 G
1000 VIAL (EA) INJECTION ONCE
Refills: 0 | Status: COMPLETED | OUTPATIENT
Start: 2021-09-27 | End: 2021-09-27

## 2021-09-27 RX ADMIN — Medication 30 MILLIGRAM(S): at 19:21

## 2021-09-27 RX ADMIN — HEPARIN SODIUM 5000 UNIT(S): 5000 INJECTION INTRAVENOUS; SUBCUTANEOUS at 22:23

## 2021-09-27 RX ADMIN — Medication 3 MILLILITER(S): at 20:31

## 2021-09-27 RX ADMIN — SODIUM CHLORIDE 75 MILLILITER(S): 9 INJECTION, SOLUTION INTRAVENOUS at 19:41

## 2021-09-27 RX ADMIN — ATORVASTATIN CALCIUM 10 MILLIGRAM(S): 80 TABLET, FILM COATED ORAL at 22:23

## 2021-09-27 RX ADMIN — HYDROMORPHONE HYDROCHLORIDE 30 MILLILITER(S): 2 INJECTION INTRAMUSCULAR; INTRAVENOUS; SUBCUTANEOUS at 20:21

## 2021-09-27 RX ADMIN — Medication 100 MILLIGRAM(S): at 22:24

## 2021-09-27 RX ADMIN — Medication 30 MILLIGRAM(S): at 19:00

## 2021-09-27 NOTE — BRIEF OPERATIVE NOTE - NSICDXBRIEFPROCEDURE_GEN_ALL_CORE_FT
PROCEDURES:  Excision of lung lingula 27-Sep-2021 18:24:55  Keron Pinto   PROCEDURES:  Excision of lung lingula 27-Sep-2021 18:24:55  Keron Pinto  VATS, with mediastinal exploration 27-Sep-2021 18:33:04  Keron Pinto

## 2021-09-27 NOTE — PRE-ANESTHESIA EVALUATION ADULT - NSANTHADDINFOFT_GEN_ALL_CORE
pt with limited mouth opening. record from previous anesthetic reviewed. video laryngascope used and LUIS placed without issue.

## 2021-09-27 NOTE — DISCHARGE NOTE PROVIDER - PROVIDER TOKENS
PROVIDER:[TOKEN:[[id number]],SCHEDULEDAPPT:[10/12/2021],SCHEDULEDAPPTTIME:[12:00 PM]],PROVIDER:[TOKEN:[[id number]],FOLLOWUP:[Routine]],PROVIDER:[TOKEN:[[id number]],FOLLOWUP:[1 month]]

## 2021-09-27 NOTE — ASU PATIENT PROFILE, ADULT - AS SC BRADEN MOISTURE
Problem: Patient Centered Care  Goal: Patient preferences are identified and integrated in the patient's plan of care  Description  Interventions:  - What would you like us to know as we care for you?  I am usually wheelchair bound   - Provide timely, com (4) rarely moist

## 2021-09-27 NOTE — PRE-ANESTHESIA EVALUATION ADULT - NSANTHOSAYNRD_GEN_A_CORE
No. CECIL screening performed.  STOP BANG Legend: 0-2 = LOW Risk; 3-4 = INTERMEDIATE Risk; 5-8 = HIGH Risk

## 2021-09-27 NOTE — CHART NOTE - NSCHARTNOTEFT_GEN_A_CORE
PACU ANESTHESIA ADMISSION NOTE      Procedure: Excision of lung lingula    VATS, with mediastinal exploration      Post op diagnosis:  Adenocarcinoma of left lung        ____  Intubated  TV:______       Rate: ______      FiO2: ______    _x___  Patent Airway    _x___  Full return of protective reflexes    _x___  Full recovery from anesthesia / back to baseline status    Vitals:  T97  HR74  BP: 138/77  RR: 13 (09-27-21 @ 18:20) (13 - 16)  SpO2: 100% (09-27-21 @ 18:35) (100% - 100%)    Mental Status:  _x___ Awake   _____ Alert   _____ Drowsy   _____ Sedated    Nausea/Vomiting:  _x___  NO       ______Yes,   See Post - Op Orders         Pain Scale (0-10):  __0___    Treatment: _x___ None    ____ See Post - Op/PCA Orders    Post - Operative Fluids:   __x__ Oral   ____ See Post - Op Orders    Plan: Discharge:   ____Home       _____Floor     __x___Critical Care    _____  Other:_________________    Comments: Pt brought to CTU spontaneously breathing hemodynamically stable. Report giving to ICU team    No anesthesia issues or complications noted.  Discharge when criteria met.
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
## 2021-09-27 NOTE — DISCHARGE NOTE PROVIDER - NSDCMRMEDTOKEN_GEN_ALL_CORE_FT
Calcium: 1200 milligram(s) orally 2 times a day  Dulcolax Stool Softener 100 mg oral capsule: 1 cap(s) orally 2 times a day  FAMOTIDINE 40 MG TABLET: TAKE 1 TABLET BY MOUTH EVERYDAY AT BEDTIME  Fish Oil: 1200  mucous membrane 2 times a day  Flax Seed Oil oral capsule:   Flonase 50 mcg/inh nasal spray: 1 spray(s) nasal once a day  magnesium oxide 500 mg oral tablet: 1 tab(s) orally once a day  Multiple Vitamins oral tablet: 1 tab(s) orally once a day  Pataday 0.2% ophthalmic solution: 1 drop(s) to each affected eye once a day, to both eyes  pravastatin 40 mg oral tablet: 1 tab(s) orally once a day (at bedtime)  VESIcare 5 mg oral tablet: 1 tab(s) orally once a day  Zetia 10 mg oral tablet: 1 tab(s) orally once a day (at bedtime)   Calcium: 1200 milligram(s) orally 2 times a day  Dulcolax Stool Softener 100 mg oral capsule: 1 cap(s) orally 2 times a day  FAMOTIDINE 40 MG TABLET: TAKE 1 TABLET BY MOUTH EVERYDAY AT BEDTIME  Fish Oil: 1200  mucous membrane 2 times a day  Flax Seed Oil oral capsule:   Flonase 50 mcg/inh nasal spray: 1 spray(s) nasal once a day  guaiFENesin 600 mg oral tablet, extended release: 1 tab(s) orally every 12 hours  ipratropium-albuterol 20 mcg-100 mcg/inh inhalation aerosol: 2 puff(s) inhaled 4 times a day, As Needed -for shortness of breath and/or wheezing   magnesium oxide 500 mg oral tablet: 1 tab(s) orally once a day  Multiple Vitamins oral tablet: 1 tab(s) orally once a day  oxycodone-acetaminophen 5 mg-325 mg oral tablet: 1 tab(s) orally every 6 hours, As Needed -for moderate pain MDD:4   Pataday 0.2% ophthalmic solution: 1 drop(s) to each affected eye once a day, to both eyes  pravastatin 40 mg oral tablet: 1 tab(s) orally once a day (at bedtime)  VESIcare 5 mg oral tablet: 1 tab(s) orally once a day  Zetia 10 mg oral tablet: 1 tab(s) orally once a day (at bedtime)

## 2021-09-27 NOTE — BRIEF OPERATIVE NOTE - OPERATION/FINDINGS
Mass in Lingula excised via wedge biopsy; frozen section confirmatory for adenocarcinoma. Mediastinal lymph node dissection performed. Lingula of left lung excised to well perfused margins (confirmed with intraoperative ICG)  via 45mm green load laterally, and 60mm black load superiorly. Previous wedge biopsy staple line marked and frozen section performed and no malignancy seen. Placement of 28 fr chest tube.

## 2021-09-27 NOTE — DISCHARGE NOTE PROVIDER - CARE PROVIDER_API CALL
Michele Maxwell)  Surgery; Thoracic and Cardiac Surgery  [address] [place] [address]  Phone: [phone]  Fax: [phone]  Scheduled Appointment: 10/12/2021 12:00 PM    Vaibhav Guerrero  INTERNAL MEDICINE  [address] [place] [address]  Phone: [phone]  Fax: [phone]  Follow Up Time: Routine    Carl Colvin)  Critical Care Medicine; Internal Medicine; Pulmonary Disease  [address] [place] [address], [place] [address]  Phone: [phone]  Fax: [phone]  Follow Up Time: 1 month

## 2021-09-27 NOTE — DISCHARGE NOTE PROVIDER - NSDCCPCAREPLAN_GEN_ALL_CORE_FT
PRINCIPAL DISCHARGE DIAGNOSIS  Diagnosis: Mass of upper lobe of left lung  Assessment and Plan of Treatment:

## 2021-09-27 NOTE — DISCHARGE NOTE PROVIDER - NSDCFUADDAPPT_GEN_ALL_CORE_FT
Please report to [facility] radiology at 11:00am (1 hour prior to CT surgery appointment) for repeat chest xray at [address], [place] [address]; [phone].

## 2021-09-27 NOTE — BRIEF OPERATIVE NOTE - COMMENTS
Lymph node station 7 not visualized. Of note, patient had previous mediastinal lymph node dissection for right sided malignancy

## 2021-09-28 DIAGNOSIS — Z02.9 ENCOUNTER FOR ADMINISTRATIVE EXAMINATIONS, UNSPECIFIED: ICD-10-CM

## 2021-09-28 LAB
ANION GAP SERPL CALC-SCNC: 12 MMOL/L — SIGNIFICANT CHANGE UP (ref 7–14)
BASOPHILS # BLD AUTO: 0.01 K/UL — SIGNIFICANT CHANGE UP (ref 0–0.2)
BASOPHILS NFR BLD AUTO: 0.1 % — SIGNIFICANT CHANGE UP (ref 0–1)
BUN SERPL-MCNC: 12 MG/DL — SIGNIFICANT CHANGE UP (ref 10–20)
CALCIUM SERPL-MCNC: 7.8 MG/DL — LOW (ref 8.5–10.1)
CHLORIDE SERPL-SCNC: 105 MMOL/L — SIGNIFICANT CHANGE UP (ref 98–110)
CO2 SERPL-SCNC: 18 MMOL/L — SIGNIFICANT CHANGE UP (ref 17–32)
COVID-19 SPIKE DOMAIN AB INTERP: POSITIVE
COVID-19 SPIKE DOMAIN ANTIBODY RESULT: >250 U/ML — HIGH
CREAT SERPL-MCNC: 0.7 MG/DL — SIGNIFICANT CHANGE UP (ref 0.7–1.5)
EOSINOPHIL # BLD AUTO: 0.01 K/UL — SIGNIFICANT CHANGE UP (ref 0–0.7)
EOSINOPHIL NFR BLD AUTO: 0.1 % — SIGNIFICANT CHANGE UP (ref 0–8)
GAS PNL BLDA: SIGNIFICANT CHANGE UP
GLUCOSE SERPL-MCNC: 147 MG/DL — HIGH (ref 70–99)
HCT VFR BLD CALC: 33.3 % — LOW (ref 37–47)
HGB BLD-MCNC: 11.3 G/DL — LOW (ref 12–16)
IMM GRANULOCYTES NFR BLD AUTO: 0.3 % — SIGNIFICANT CHANGE UP (ref 0.1–0.3)
LYMPHOCYTES # BLD AUTO: 0.52 K/UL — LOW (ref 1.2–3.4)
LYMPHOCYTES # BLD AUTO: 4.5 % — LOW (ref 20.5–51.1)
MAGNESIUM SERPL-MCNC: 1.5 MG/DL — LOW (ref 1.8–2.4)
MCHC RBC-ENTMCNC: 32 PG — HIGH (ref 27–31)
MCHC RBC-ENTMCNC: 33.9 G/DL — SIGNIFICANT CHANGE UP (ref 32–37)
MCV RBC AUTO: 94.3 FL — SIGNIFICANT CHANGE UP (ref 81–99)
MONOCYTES # BLD AUTO: 0.76 K/UL — HIGH (ref 0.1–0.6)
MONOCYTES NFR BLD AUTO: 6.6 % — SIGNIFICANT CHANGE UP (ref 1.7–9.3)
NEUTROPHILS # BLD AUTO: 10.22 K/UL — HIGH (ref 1.4–6.5)
NEUTROPHILS NFR BLD AUTO: 88.4 % — HIGH (ref 42.2–75.2)
NRBC # BLD: 0 /100 WBCS — SIGNIFICANT CHANGE UP (ref 0–0)
PLATELET # BLD AUTO: 159 K/UL — SIGNIFICANT CHANGE UP (ref 130–400)
POTASSIUM SERPL-MCNC: 4.4 MMOL/L — SIGNIFICANT CHANGE UP (ref 3.5–5)
POTASSIUM SERPL-SCNC: 4.4 MMOL/L — SIGNIFICANT CHANGE UP (ref 3.5–5)
RBC # BLD: 3.53 M/UL — LOW (ref 4.2–5.4)
RBC # FLD: 11.9 % — SIGNIFICANT CHANGE UP (ref 11.5–14.5)
SARS-COV-2 IGG+IGM SERPL QL IA: >250 U/ML — HIGH
SARS-COV-2 IGG+IGM SERPL QL IA: POSITIVE
SODIUM SERPL-SCNC: 135 MMOL/L — SIGNIFICANT CHANGE UP (ref 135–146)
WBC # BLD: 11.56 K/UL — HIGH (ref 4.8–10.8)
WBC # FLD AUTO: 11.56 K/UL — HIGH (ref 4.8–10.8)

## 2021-09-28 PROCEDURE — 93010 ELECTROCARDIOGRAM REPORT: CPT

## 2021-09-28 PROCEDURE — 71045 X-RAY EXAM CHEST 1 VIEW: CPT | Mod: 26

## 2021-09-28 RX ORDER — KETOROLAC TROMETHAMINE 30 MG/ML
15 SYRINGE (ML) INJECTION EVERY 8 HOURS
Refills: 0 | Status: DISCONTINUED | OUTPATIENT
Start: 2021-09-28 | End: 2021-09-29

## 2021-09-28 RX ORDER — CEFAZOLIN SODIUM 1 G
1000 VIAL (EA) INJECTION ONCE
Refills: 0 | Status: COMPLETED | OUTPATIENT
Start: 2021-09-28 | End: 2021-09-28

## 2021-09-28 RX ORDER — METOPROLOL TARTRATE 50 MG
12.5 TABLET ORAL EVERY 12 HOURS
Refills: 0 | Status: DISCONTINUED | OUTPATIENT
Start: 2021-09-28 | End: 2021-09-28

## 2021-09-28 RX ORDER — DIPHENHYDRAMINE HCL 50 MG
25 CAPSULE ORAL ONCE
Refills: 0 | Status: COMPLETED | OUTPATIENT
Start: 2021-09-28 | End: 2021-09-28

## 2021-09-28 RX ORDER — ACETAMINOPHEN 500 MG
1000 TABLET ORAL ONCE
Refills: 0 | Status: COMPLETED | OUTPATIENT
Start: 2021-09-29 | End: 2021-09-28

## 2021-09-28 RX ORDER — ACETAMINOPHEN 500 MG
1000 TABLET ORAL ONCE
Refills: 0 | Status: COMPLETED | OUTPATIENT
Start: 2021-09-28 | End: 2021-09-28

## 2021-09-28 RX ADMIN — FAMOTIDINE 20 MILLIGRAM(S): 10 INJECTION INTRAVENOUS at 18:22

## 2021-09-28 RX ADMIN — Medication 600 MILLIGRAM(S): at 05:18

## 2021-09-28 RX ADMIN — Medication 400 MILLIGRAM(S): at 23:48

## 2021-09-28 RX ADMIN — Medication 5 MILLIGRAM(S): at 18:22

## 2021-09-28 RX ADMIN — Medication 15 MILLIGRAM(S): at 15:16

## 2021-09-28 RX ADMIN — Medication 100 MILLIGRAM(S): at 19:45

## 2021-09-28 RX ADMIN — Medication 1 SPRAY(S): at 12:20

## 2021-09-28 RX ADMIN — Medication 15 MILLIGRAM(S): at 14:46

## 2021-09-28 RX ADMIN — Medication 3 MILLILITER(S): at 11:36

## 2021-09-28 RX ADMIN — HEPARIN SODIUM 5000 UNIT(S): 5000 INJECTION INTRAVENOUS; SUBCUTANEOUS at 05:18

## 2021-09-28 RX ADMIN — Medication 15 MILLIGRAM(S): at 22:30

## 2021-09-28 RX ADMIN — HEPARIN SODIUM 5000 UNIT(S): 5000 INJECTION INTRAVENOUS; SUBCUTANEOUS at 14:40

## 2021-09-28 RX ADMIN — Medication 600 MILLIGRAM(S): at 18:22

## 2021-09-28 RX ADMIN — POLYETHYLENE GLYCOL 3350 17 GRAM(S): 17 POWDER, FOR SOLUTION ORAL at 12:19

## 2021-09-28 RX ADMIN — FAMOTIDINE 20 MILLIGRAM(S): 10 INJECTION INTRAVENOUS at 05:18

## 2021-09-28 RX ADMIN — Medication 400 MILLIGRAM(S): at 05:15

## 2021-09-28 RX ADMIN — Medication 1000 MILLIGRAM(S): at 00:30

## 2021-09-28 RX ADMIN — Medication 25 MILLIGRAM(S): at 23:49

## 2021-09-28 RX ADMIN — Medication 15 MILLIGRAM(S): at 22:14

## 2021-09-28 RX ADMIN — SENNA PLUS 2 TABLET(S): 8.6 TABLET ORAL at 21:32

## 2021-09-28 RX ADMIN — Medication 1000 MILLIGRAM(S): at 18:36

## 2021-09-28 RX ADMIN — Medication 400 MILLIGRAM(S): at 00:00

## 2021-09-28 RX ADMIN — Medication 3 MILLILITER(S): at 21:00

## 2021-09-28 RX ADMIN — Medication 1000 MILLIGRAM(S): at 05:45

## 2021-09-28 RX ADMIN — MAGNESIUM OXIDE 400 MG ORAL TABLET 400 MILLIGRAM(S): 241.3 TABLET ORAL at 12:19

## 2021-09-28 RX ADMIN — Medication 400 MILLIGRAM(S): at 18:21

## 2021-09-28 RX ADMIN — ATORVASTATIN CALCIUM 10 MILLIGRAM(S): 80 TABLET, FILM COATED ORAL at 21:32

## 2021-09-28 RX ADMIN — ONDANSETRON 4 MILLIGRAM(S): 8 TABLET, FILM COATED ORAL at 04:34

## 2021-09-28 RX ADMIN — Medication 5 MILLIGRAM(S): at 05:18

## 2021-09-28 RX ADMIN — HEPARIN SODIUM 5000 UNIT(S): 5000 INJECTION INTRAVENOUS; SUBCUTANEOUS at 21:32

## 2021-09-28 NOTE — PHYSICAL THERAPY INITIAL EVALUATION ADULT - GAIT TRAINING, PT EVAL
Pt will be Mod I amb 400'x1 without AD by d/c.    Pt will negotiate 1 flight of stairs mod I with 1 HR by d/c.

## 2021-09-28 NOTE — PHYSICAL THERAPY INITIAL EVALUATION ADULT - GENERAL OBSERVATIONS, REHAB EVAL
09:40-10:22 Pt encountered sitting in b/s chair with tele, IV, radial A-line, CT to suction, 3L/m O2 NC, prima fit, B/L SCDs, in NAD. BP: 98/53, HR: 64bpm, SpO2 100% on O2. Pt left same as found with tele, IV, radial A-line, CT to suction, 3L/m O2 NC, B/L SCDs, in NAD. BP: 96/52, HR: 67bpm, SpO2 100% on O2.

## 2021-09-28 NOTE — PROGRESS NOTE ADULT - ASSESSMENT
Follow pathology   Chest tube to suction  Daily chest xray  Monitor chest tube out put  Monitor for airleaks  Monitor 02 saturation  Incentive spirometry  encourage ambulation  DVT/GI prophylaxis  Pain management   Follow pathology   Chest tube to WS  Daily chest xray  Monitor chest tube out put  Monitor for airleaks  Monitor 02 saturation  Incentive spirometry  encourage ambulation  DVT/GI prophylaxis  Pain management

## 2021-09-29 ENCOUNTER — TRANSCRIPTION ENCOUNTER (OUTPATIENT)
Age: 71
End: 2021-09-29

## 2021-09-29 VITALS
SYSTOLIC BLOOD PRESSURE: 114 MMHG | DIASTOLIC BLOOD PRESSURE: 64 MMHG | HEART RATE: 63 BPM | OXYGEN SATURATION: 99 % | RESPIRATION RATE: 22 BRPM

## 2021-09-29 LAB
ANION GAP SERPL CALC-SCNC: 9 MMOL/L — SIGNIFICANT CHANGE UP (ref 7–14)
BUN SERPL-MCNC: 9 MG/DL — LOW (ref 10–20)
CALCIUM SERPL-MCNC: 8.4 MG/DL — LOW (ref 8.5–10.1)
CHLORIDE SERPL-SCNC: 110 MMOL/L — SIGNIFICANT CHANGE UP (ref 98–110)
CO2 SERPL-SCNC: 24 MMOL/L — SIGNIFICANT CHANGE UP (ref 17–32)
CREAT SERPL-MCNC: 0.8 MG/DL — SIGNIFICANT CHANGE UP (ref 0.7–1.5)
GLUCOSE SERPL-MCNC: 103 MG/DL — HIGH (ref 70–99)
HCT VFR BLD CALC: 31.8 % — LOW (ref 37–47)
HGB BLD-MCNC: 10.4 G/DL — LOW (ref 12–16)
MAGNESIUM SERPL-MCNC: 2.1 MG/DL — SIGNIFICANT CHANGE UP (ref 1.8–2.4)
MCHC RBC-ENTMCNC: 32 PG — HIGH (ref 27–31)
MCHC RBC-ENTMCNC: 32.7 G/DL — SIGNIFICANT CHANGE UP (ref 32–37)
MCV RBC AUTO: 97.8 FL — SIGNIFICANT CHANGE UP (ref 81–99)
NRBC # BLD: 0 /100 WBCS — SIGNIFICANT CHANGE UP (ref 0–0)
PLATELET # BLD AUTO: 143 K/UL — SIGNIFICANT CHANGE UP (ref 130–400)
POTASSIUM SERPL-MCNC: 4.6 MMOL/L — SIGNIFICANT CHANGE UP (ref 3.5–5)
POTASSIUM SERPL-SCNC: 4.6 MMOL/L — SIGNIFICANT CHANGE UP (ref 3.5–5)
RBC # BLD: 3.25 M/UL — LOW (ref 4.2–5.4)
RBC # FLD: 12.3 % — SIGNIFICANT CHANGE UP (ref 11.5–14.5)
SODIUM SERPL-SCNC: 143 MMOL/L — SIGNIFICANT CHANGE UP (ref 135–146)
WBC # BLD: 7.52 K/UL — SIGNIFICANT CHANGE UP (ref 4.8–10.8)
WBC # FLD AUTO: 7.52 K/UL — SIGNIFICANT CHANGE UP (ref 4.8–10.8)

## 2021-09-29 PROCEDURE — 71045 X-RAY EXAM CHEST 1 VIEW: CPT | Mod: 26,77

## 2021-09-29 PROCEDURE — 71045 X-RAY EXAM CHEST 1 VIEW: CPT | Mod: 26

## 2021-09-29 PROCEDURE — 99232 SBSQ HOSP IP/OBS MODERATE 35: CPT

## 2021-09-29 RX ORDER — IPRATROPIUM/ALBUTEROL SULFATE 18-103MCG
2 AEROSOL WITH ADAPTER (GRAM) INHALATION
Qty: 1 | Refills: 0
Start: 2021-09-29 | End: 2021-10-28

## 2021-09-29 RX ADMIN — Medication 1 SPRAY(S): at 11:23

## 2021-09-29 RX ADMIN — Medication 15 MILLIGRAM(S): at 07:00

## 2021-09-29 RX ADMIN — Medication 5 MILLIGRAM(S): at 05:10

## 2021-09-29 RX ADMIN — POLYETHYLENE GLYCOL 3350 17 GRAM(S): 17 POWDER, FOR SOLUTION ORAL at 11:23

## 2021-09-29 RX ADMIN — Medication 15 MILLIGRAM(S): at 06:35

## 2021-09-29 RX ADMIN — HEPARIN SODIUM 5000 UNIT(S): 5000 INJECTION INTRAVENOUS; SUBCUTANEOUS at 05:10

## 2021-09-29 RX ADMIN — MAGNESIUM OXIDE 400 MG ORAL TABLET 400 MILLIGRAM(S): 241.3 TABLET ORAL at 11:23

## 2021-09-29 RX ADMIN — Medication 1000 MILLIGRAM(S): at 00:15

## 2021-09-29 RX ADMIN — Medication 600 MILLIGRAM(S): at 05:10

## 2021-09-29 RX ADMIN — FAMOTIDINE 20 MILLIGRAM(S): 10 INJECTION INTRAVENOUS at 05:10

## 2021-09-29 NOTE — DISCHARGE NOTE NURSING/CASE MANAGEMENT/SOCIAL WORK - PATIENT PORTAL LINK FT
You can access the FollowMyHealth Patient Portal offered by [facility] by registering at the following website: [email or web address]. By joining [facility]’s FollowMyHealth portal, you will also be able to view your health information using other applications (apps) compatible with our system.

## 2021-09-29 NOTE — PROGRESS NOTE ADULT - ASSESSMENT
IMPRESSION:  lung cancer adenoca   s/p wedge resection EBONY nodule     PLAN:    encourage incentive spirometer   ambulation   dvt prophylaxis   check pox on RA rest and exertion   chest tube management as per ct surgery

## 2021-09-29 NOTE — PROGRESS NOTE ADULT - SUBJECTIVE AND OBJECTIVE BOX
CT Surgery Post Operative Note      Procedure: Status post left vats EBONY wedge biopsy, mediastinal exploration , mass in lingula excised     Operative Findings:  · Operative Findings	Mass in Lingula excised via wedge biopsy; frozen section confirmatory for adenocarcinoma. Mediastinal lymph node dissection performed. Lingula of left lung excised to well perfused margins (confirmed with intraoperative ICG)  via 45mm green load laterally, and 60mm black load superiorly. Previous wedge biopsy staple line marked and frozen section performed and no malignancy seen. Placement of 28 fr chest tube.      Post Operative day #1      T(C): 35.8 (09-27-21 @ 20:00), Max: 36.2 (09-27-21 @ 11:01)  HR: 72 (09-28-21 @ 00:00) (69 - 78)  BP: 118/76 (09-27-21 @ 22:00) (96/65 - 138/77)  RR: 11 (09-28-21 @ 00:00) (11 - 43)  SpO2: 100% (09-28-21 @ 00:00) (99% - 100%)    09-27-21 @ 07:01  -  09-28-21 @ 02:31  --------------------------------------------------------  IN: 425 mL / OUT: 305 mL / NET: 120 mL        General:Alert and oriented times 3, not in acute distress   Heart: Regular rate and rhythm, no rubs , murmurs or gallops  Lungs: Clear to auscultation bilaterally, no wheezes, rales, rhonci appreciated, left chest tube +airleak to suction serosanguinous outpt   Abdomen: Soft , positive bowel sounds, no tenderness, no distention, no peritoneal signs   Exremites: warm extremities,  good color, no swelling, motor and sensation , pulses                   11.3   11.56 )-----------( 159      ( 09-28 @ 02:10 )             33.3                12.1   12.59 )-----------( 165      ( 09-27 @ 18:29 )             35.2                    138   |  107   |  13                 Ca: 7.8    BMP:   ----------------------------< 180    Mg: x     (09-27-21 @ 18:29)             4.0    |  19    | 0.8                Ph: x        LFT:     TPro: 5.2 / Alb: 3.6 / TBili: 0.5 / DBili: x / AST: 34 / ALT: 32 / AlkPhos: 41   (09-27-21 @ 18:29)          PT/INR - ( 27 Sep 2021 18:29 )   PT: 12.10 sec;   INR: 1.05 ratio         PTT - ( 27 Sep 2021 18:29 )  PTT:26.1 sec                    
Patient is a 71y old  Female who presents with a chief complaint of Left lung nodule. (27 Sep 2021 18:41)      Over Night Events:  Patient seen and examined.   she feel good on RA no distress   been using incentive spirometer     ROS:  See HPI    PHYSICAL EXAM    ICU Vital Signs Last 24 Hrs  T(C): 37.4 (29 Sep 2021 02:00), Max: 37.5 (29 Sep 2021 00:00)  T(F): 99.4 (29 Sep 2021 02:00), Max: 99.5 (29 Sep 2021 00:00)  HR: 59 (29 Sep 2021 06:00) (59 - 86)  BP: 109/61 (29 Sep 2021 06:00) (92/55 - 155/125)  BP(mean): 80 (29 Sep 2021 06:00) (67 - 132)  ABP: 94/43 (28 Sep 2021 09:00) (94/43 - 94/43)  ABP(mean): 63 (28 Sep 2021 09:00) (63 - 63)  RR: 17 (29 Sep 2021 06:00) (14 - 32)  SpO2: 98% (29 Sep 2021 06:00) (93% - 100%)      General: AOx3  HEENT:         joanna       Lymph Nodes: NO cervical LN   Lungs: Bilateral BS  Cardiovascular: Regular   Abdomen: Soft, Positive BS  Extremities: No clubbing   Skin: warm   Neurological:   Musculoskeletal: move all ext     I&O's Detail    28 Sep 2021 07:01  -  29 Sep 2021 07:00  --------------------------------------------------------  IN:    IV PiggyBack: 150 mL    Oral Fluid: 1080 mL    sodium chloride 0.9%: 120 mL  Total IN: 1350 mL    OUT:    Chest Tube (mL): 80 mL    Voided (mL): 1550 mL  Total OUT: 1630 mL    Total NET: -280 mL          LABS:                          10.4   7.52  )-----------( 143      ( 29 Sep 2021 03:00 )             31.8         29 Sep 2021 03:00    143    |  110    |  9      ----------------------------<  103    4.6     |  24     |  0.8      Ca    8.4        29 Sep 2021 03:00  Mg     2.1       29 Sep 2021 03:00                                               PT/INR - ( 27 Sep 2021 18:29 )   PT: 12.10 sec;   INR: 1.05 ratio         PTT - ( 27 Sep 2021 18:29 )  PTT:26.1 sec                                                                                                                                                                                    ABG - ( 28 Sep 2021 03:28 )  pH, Arterial: 7.33  pH, Blood: x     /  pCO2: 44    /  pO2: 168   / HCO3: 23    / Base Excess: -2.8  /  SaO2: 99.3                MEDICATIONS  (STANDING):  albuterol/ipratropium for Nebulization 3 milliLiter(s) Nebulizer every 6 hours  atorvastatin 10 milliGRAM(s) Oral at bedtime  bisacodyl Suppository 10 milliGRAM(s) Rectal once  BUpivacaine liposome 1.3% Injectable 20 milliLiter(s) Local Injection once  chlorhexidine 4% Liquid 1 Application(s) Topical daily  dextrose 5% + sodium chloride 0.45%. 1000 milliLiter(s) (75 mL/Hr) IV Continuous <Continuous>  famotidine    Tablet 20 milliGRAM(s) Oral two times a day  fluticasone propionate (50 MICROgram(s)/actuation) Nasal Spray - Peds 1 Spray(s) Alternating Nostrils daily  guaiFENesin  milliGRAM(s) Oral every 12 hours  heparin   Injectable 5000 Unit(s) SubCutaneous every 8 hours  HYDROmorphone PCA (1 mG/mL) 30 milliLiter(s) PCA Continuous PCA Continuous  magnesium oxide 400 milliGRAM(s) Oral daily  oxybutynin 5 milliGRAM(s) Oral two times a day  polyethylene glycol 3350 17 Gram(s) Oral daily  senna 2 Tablet(s) Oral at bedtime  sodium chloride 0.9%. 1000 milliLiter(s) (10 mL/Hr) IV Continuous <Continuous>    MEDICATIONS  (PRN):  hydrALAZINE Injectable 10 milliGRAM(s) IV Push every 4 hours PRN SBP >/= 145 mmHg  morphine  - Injectable 4 milliGRAM(s) IV Push every 10 minutes PRN Severe Pain (7 - 10)  morphine  - Injectable 2 milliGRAM(s) IV Push every 10 minutes PRN Moderate Pain (4 - 6)  naloxone Injectable 0.1 milliGRAM(s) IV Push every 3 minutes PRN For ANY of the following changes in patient status:  A. RR LESS THAN 10 breaths per minute, B. Oxygen saturation LESS THAN 90%, C. Sedation score of 6  ondansetron Injectable 4 milliGRAM(s) IV Push every 6 hours PRN Nausea  ondansetron Injectable 4 milliGRAM(s) IV Push once PRN Nausea and/or Vomiting          Xrays:  TLC:  OG:  ET tube:                                                                                    mild opacity left with chest tube    ECHO:  [facility] ICU:        
CARLOS OLEA  71y Female   [id number]    Hospital Day:3  Post Operative Day:2  Procedure:s/p lingulectomy and mediastinal lymph node dissection   Patient is a 71y old  Female who presents with a chief complaint of Left lung nodule. (27 Sep 2021 18:41)    PAST MEDICAL & SURGICAL HISTORY:  SOB (shortness of breath)  with mod activity    Murmur    Constipation    Breast cancer  right    Lung cancer    Pancreatic cyst    Neuropathic pain    Overactive bladder    IPMN (intraductal papillary mucinous neoplasm)    High cholesterol    History of mastectomy  right    History of appendectomy    S/P breast reconstruction, right    History of lung surgery    History of lobectomy of lung  2020: right upper lobe wedge and right upper lobe completion lobectomy of levels 9, 11, 4, 7, and 12 lymph nodes    H/O bilateral breast implants        Events of the Last 24h:CT to water seal clamped at midnight   Vital Signs Last 24 Hrs  T(C): 37.5 (29 Sep 2021 00:00), Max: 37.5 (29 Sep 2021 00:00)  T(F): 99.5 (29 Sep 2021 00:00), Max: 99.5 (29 Sep 2021 00:00)  HR: 71 (29 Sep 2021 00:00) (61 - 86)  BP: 130/66 (29 Sep 2021 00:00) (92/55 - 155/125)  BP(mean): 92 (29 Sep 2021 00:00) (67 - 132)  RR: 29 (29 Sep 2021 00:00) (10 - 29)  SpO2: 99% (29 Sep 2021 00:00) (93% - 100%)        Diet, DASH/TLC:   Sodium & Cholesterol Restricted (21 @ 08:28)      I&O's Summary    27 Sep 2021 07:01  -  28 Sep 2021 07:00  --------------------------------------------------------  IN: 1025 mL / OUT: 450 mL / NET: 575 mL    28 Sep 2021 07:01  -  29 Sep 2021 00:32  --------------------------------------------------------  IN: 1350 mL / OUT: 1630 mL / NET: -280 mL     I&O's Detail    27 Sep 2021 07:01  -  28 Sep 2021 07:00  --------------------------------------------------------  IN:    dextrose 5% + sodium chloride 0.45%: 975 mL    IV PiggyBack: 50 mL  Total IN: 1025 mL    OUT:    Chest Tube (mL): 130 mL    Indwelling Catheter - Urethral (mL): 320 mL  Total OUT: 450 mL    Total NET: 575 mL      28 Sep 2021 07:01  -  29 Sep 2021 00:32  --------------------------------------------------------  IN:    IV PiggyBack: 150 mL    Oral Fluid: 1080 mL    sodium chloride 0.9%: 120 mL  Total IN: 1350 mL    OUT:    Chest Tube (mL): 80 mL    Voided (mL): 1550 mL  Total OUT: 1630 mL    Total NET: -280 mL          MEDICATIONS  (STANDING):  albuterol/ipratropium for Nebulization 3 milliLiter(s) Nebulizer every 6 hours  atorvastatin 10 milliGRAM(s) Oral at bedtime  bisacodyl Suppository 10 milliGRAM(s) Rectal once  BUpivacaine liposome 1.3% Injectable 20 milliLiter(s) Local Injection once  chlorhexidine 4% Liquid 1 Application(s) Topical daily  dextrose 5% + sodium chloride 0.45%. 1000 milliLiter(s) (75 mL/Hr) IV Continuous <Continuous>  famotidine    Tablet 20 milliGRAM(s) Oral two times a day  fluticasone propionate (50 MICROgram(s)/actuation) Nasal Spray - Peds 1 Spray(s) Alternating Nostrils daily  guaiFENesin  milliGRAM(s) Oral every 12 hours  heparin   Injectable 5000 Unit(s) SubCutaneous every 8 hours  HYDROmorphone PCA (1 mG/mL) 30 milliLiter(s) PCA Continuous PCA Continuous  magnesium oxide 400 milliGRAM(s) Oral daily  oxybutynin 5 milliGRAM(s) Oral two times a day  polyethylene glycol 3350 17 Gram(s) Oral daily  senna 2 Tablet(s) Oral at bedtime  sodium chloride 0.9%. 1000 milliLiter(s) (10 mL/Hr) IV Continuous <Continuous>    MEDICATIONS  (PRN):  hydrALAZINE Injectable 10 milliGRAM(s) IV Push every 4 hours PRN SBP >/= 145 mmHg  ketorolac   Injectable 15 milliGRAM(s) IV Push every 8 hours PRN Severe Pain (7 - 10)  morphine  - Injectable 2 milliGRAM(s) IV Push every 10 minutes PRN Moderate Pain (4 - 6)  morphine  - Injectable 4 milliGRAM(s) IV Push every 10 minutes PRN Severe Pain (7 - 10)  naloxone Injectable 0.1 milliGRAM(s) IV Push every 3 minutes PRN For ANY of the following changes in patient status:  A. RR LESS THAN 10 breaths per minute, B. Oxygen saturation LESS THAN 90%, C. Sedation score of 6  ondansetron Injectable 4 milliGRAM(s) IV Push every 6 hours PRN Nausea  ondansetron Injectable 4 milliGRAM(s) IV Push once PRN Nausea and/or Vomiting      PHYSICAL EXAM:    GENERAL: NAD    HEENT: NCAT    CHEST/LUNGS: CTAB, ct clamped sero sanguinous outpt     HEART: RRR,  No murmurs, rubs, or gallops    ABDOMEN: SNTND +BS    EXTREMITIES:  FROM, No clubbing, cyanosis, or edema, palpable pulse    NEURO: No focal neurological deficits    SKIN: No rashes or lesions    INCISION/WOUNDS:                          11.3   11.56 )-----------( 159      ( 28 Sep 2021 02:10 )             33.3        CBC Full  -  ( 28 Sep 2021 02:10 )  WBC Count : 11.56 K/uL  RBC Count : 3.53 M/uL  Hemoglobin : 11.3 g/dL  Hematocrit : 33.3 %  Platelet Count - Automated : 159 K/uL  Mean Cell Volume : 94.3 fL  Mean Cell Hemoglobin : 32.0 pg  Mean Cell Hemoglobin Concentration : 33.9 g/dL  Auto Neutrophil # : 10.22 K/uL  Auto Lymphocyte # : 0.52 K/uL  Auto Monocyte # : 0.76 K/uL  Auto Eosinophil # : 0.01 K/uL  Auto Basophil # : 0.01 K/uL  Auto Neutrophil % : 88.4 %  Auto Lymphocyte % : 4.5 %  Auto Monocyte % : 6.6 %  Auto Eosinophil % : 0.1 %  Auto Basophil % : 0.1 %               135   |  105   |  12                 Ca: 7.8    BMP:   ----------------------------< 147    M.5   (21 @ 02:10)             4.4    |  18    | 0.7                Ph: x        LFT:     TPro: 5.2 / Alb: 3.6 / TBili: 0.5 / DBili: x / AST: 34 / ALT: 32 / AlkPhos: 41   (21 @ 18:29)    LIVER FUNCTIONS - ( 27 Sep 2021 18:29 )  Alb: 3.6 g/dL / Pro: 5.2 g/dL / ALK PHOS: 41 U/L / ALT: 32 U/L / AST: 34 U/L / GGT: x           PT/INR - ( 27 Sep 2021 18:29 )   PT: 12.10 sec;   INR: 1.05 ratio         PTT - ( 27 Sep 2021 18:29 )  PTT:26.1 sec          < from: Xray Chest 1 View- PORTABLE-Routine (21 @ 07:00) >  FINDINGS:    Support devices: Stable left chest tube.    Cardiac/mediastinum/hilum: Stable.    Lung parenchyma/Pleura: Patchy left lung opacities. There is no visualized pneumothorax.    Skeleton/soft tissues: Gastric distention. Unchanged subcutaneous emphysema.    IMPRESSION:    Patchy left lung opacities.    --- End of Report ---          < end of copied text >

## 2021-09-29 NOTE — DISCHARGE NOTE NURSING/CASE MANAGEMENT/SOCIAL WORK - NSDCFUADDAPPT_GEN_ALL_CORE_FT
Please report to [facility] radiology at 11:00am (1 hour prior to CT surgery appointment) for repeat chest xray at [address], [place] [address]; [phone].

## 2021-09-29 NOTE — PROGRESS NOTE ADULT - ATTENDING COMMENTS
POD 2 s/p robotic assisted left thoracoscopy, lingulectomy for NSCLC  Stable, pain under control, chest tube in place clamped overnight, CXR: adequate lung expansion  Plan:  OOB  IS  Pain control  Remove chest tube   D/C planning
POD 1 s/p robotic assisted left thoracoscopy, lingulectomy for NSCLC  Stable, pain under control, chest tube in place, no air leak, CXR: adequate lung expansion  Plan:  OOB  IS  Pain control  Clamp chest tube

## 2021-09-29 NOTE — PROGRESS NOTE ADULT - ASSESSMENT
Chest tube clamped   Daily chest xray  Possible chest tube removal after cxr results   Monitor chest tube out put  Monitor for airleaks  Monitor 02 saturation  Incentive spirometry  encourage ambulation  DVT/GI prophylaxis  Pain management

## 2021-10-01 ENCOUNTER — NON-APPOINTMENT (OUTPATIENT)
Age: 71
End: 2021-10-01

## 2021-10-01 LAB — SURGICAL PATHOLOGY STUDY: SIGNIFICANT CHANGE UP

## 2021-10-03 ENCOUNTER — TRANSCRIPTION ENCOUNTER (OUTPATIENT)
Age: 71
End: 2021-10-03

## 2021-10-06 DIAGNOSIS — C34.12 MALIGNANT NEOPLASM OF UPPER LOBE, LEFT BRONCHUS OR LUNG: ICD-10-CM

## 2021-10-06 DIAGNOSIS — I08.1 RHEUMATIC DISORDERS OF BOTH MITRAL AND TRICUSPID VALVES: ICD-10-CM

## 2021-10-06 DIAGNOSIS — E78.00 PURE HYPERCHOLESTEROLEMIA, UNSPECIFIED: ICD-10-CM

## 2021-10-06 DIAGNOSIS — K59.00 CONSTIPATION, UNSPECIFIED: ICD-10-CM

## 2021-10-06 DIAGNOSIS — Z85.3 PERSONAL HISTORY OF MALIGNANT NEOPLASM OF BREAST: ICD-10-CM

## 2021-10-06 DIAGNOSIS — N32.81 OVERACTIVE BLADDER: ICD-10-CM

## 2021-10-12 ENCOUNTER — OUTPATIENT (OUTPATIENT)
Dept: OUTPATIENT SERVICES | Facility: HOSPITAL | Age: 71
LOS: 1 days | Discharge: HOME | End: 2021-10-12
Payer: MEDICARE

## 2021-10-12 ENCOUNTER — APPOINTMENT (OUTPATIENT)
Dept: CARDIOTHORACIC SURGERY | Facility: CLINIC | Age: 71
End: 2021-10-12
Payer: MEDICARE

## 2021-10-12 VITALS
OXYGEN SATURATION: 98 % | SYSTOLIC BLOOD PRESSURE: 106 MMHG | TEMPERATURE: 98.8 F | DIASTOLIC BLOOD PRESSURE: 71 MMHG | WEIGHT: 91 LBS | RESPIRATION RATE: 18 BRPM | HEART RATE: 85 BPM | HEIGHT: 59 IN | BODY MASS INDEX: 18.35 KG/M2

## 2021-10-12 DIAGNOSIS — Z09 ENCOUNTER FOR FOLLOW-UP EXAMINATION AFTER COMPLETED TREATMENT FOR CONDITIONS OTHER THAN MALIGNANT NEOPLASM: ICD-10-CM

## 2021-10-12 DIAGNOSIS — R07.9 CHEST PAIN, UNSPECIFIED: ICD-10-CM

## 2021-10-12 DIAGNOSIS — Z90.10 ACQUIRED ABSENCE OF UNSPECIFIED BREAST AND NIPPLE: Chronic | ICD-10-CM

## 2021-10-12 DIAGNOSIS — Z90.2 ACQUIRED ABSENCE OF LUNG [PART OF]: Chronic | ICD-10-CM

## 2021-10-12 DIAGNOSIS — Z90.49 ACQUIRED ABSENCE OF OTHER SPECIFIED PARTS OF DIGESTIVE TRACT: Chronic | ICD-10-CM

## 2021-10-12 DIAGNOSIS — Z98.890 OTHER SPECIFIED POSTPROCEDURAL STATES: Chronic | ICD-10-CM

## 2021-10-12 DIAGNOSIS — Z98.82 BREAST IMPLANT STATUS: Chronic | ICD-10-CM

## 2021-10-12 PROCEDURE — 71046 X-RAY EXAM CHEST 2 VIEWS: CPT | Mod: 26

## 2021-10-12 PROCEDURE — 99024 POSTOP FOLLOW-UP VISIT: CPT

## 2021-10-15 ENCOUNTER — APPOINTMENT (OUTPATIENT)
Age: 71
End: 2021-10-15
Payer: MEDICARE

## 2021-10-15 VITALS
HEART RATE: 65 BPM | SYSTOLIC BLOOD PRESSURE: 118 MMHG | BODY MASS INDEX: 19.35 KG/M2 | RESPIRATION RATE: 14 BRPM | DIASTOLIC BLOOD PRESSURE: 64 MMHG | OXYGEN SATURATION: 97 % | HEIGHT: 59 IN | WEIGHT: 96 LBS

## 2021-10-15 PROCEDURE — 99213 OFFICE O/P EST LOW 20 MIN: CPT

## 2021-10-28 ENCOUNTER — OUTPATIENT (OUTPATIENT)
Dept: OUTPATIENT SERVICES | Facility: HOSPITAL | Age: 71
LOS: 1 days | Discharge: HOME | End: 2021-10-28
Payer: MEDICARE

## 2021-10-28 DIAGNOSIS — Z98.890 OTHER SPECIFIED POSTPROCEDURAL STATES: Chronic | ICD-10-CM

## 2021-10-28 DIAGNOSIS — Z90.2 ACQUIRED ABSENCE OF LUNG [PART OF]: Chronic | ICD-10-CM

## 2021-10-28 DIAGNOSIS — R06.02 SHORTNESS OF BREATH: ICD-10-CM

## 2021-10-28 DIAGNOSIS — Z98.82 BREAST IMPLANT STATUS: Chronic | ICD-10-CM

## 2021-10-28 DIAGNOSIS — Z90.10 ACQUIRED ABSENCE OF UNSPECIFIED BREAST AND NIPPLE: Chronic | ICD-10-CM

## 2021-10-28 DIAGNOSIS — Z90.49 ACQUIRED ABSENCE OF OTHER SPECIFIED PARTS OF DIGESTIVE TRACT: Chronic | ICD-10-CM

## 2021-10-28 PROCEDURE — 71046 X-RAY EXAM CHEST 2 VIEWS: CPT | Mod: 26

## 2021-11-17 ENCOUNTER — RX RENEWAL (OUTPATIENT)
Age: 71
End: 2021-11-17

## 2021-12-02 ENCOUNTER — NON-APPOINTMENT (OUTPATIENT)
Age: 71
End: 2021-12-02

## 2022-01-03 ENCOUNTER — APPOINTMENT (OUTPATIENT)
Dept: CARDIOLOGY | Facility: CLINIC | Age: 72
End: 2022-01-03
Payer: MEDICARE

## 2022-01-03 VITALS — SYSTOLIC BLOOD PRESSURE: 114 MMHG | DIASTOLIC BLOOD PRESSURE: 64 MMHG | HEART RATE: 54 BPM

## 2022-01-03 VITALS — TEMPERATURE: 97.8 F | HEIGHT: 59 IN | WEIGHT: 94 LBS | BODY MASS INDEX: 18.95 KG/M2

## 2022-01-03 DIAGNOSIS — Z72.89 OTHER PROBLEMS RELATED TO LIFESTYLE: ICD-10-CM

## 2022-01-03 PROCEDURE — 93308 TTE F-UP OR LMTD: CPT

## 2022-01-03 PROCEDURE — 93000 ELECTROCARDIOGRAM COMPLETE: CPT

## 2022-01-03 PROCEDURE — 99214 OFFICE O/P EST MOD 30 MIN: CPT

## 2022-01-03 RX ORDER — SOLIFENACIN SUCCINATE 10 MG/1
TABLET, FILM COATED ORAL
Refills: 0 | Status: DISCONTINUED | COMMUNITY
End: 2022-01-03

## 2022-01-03 RX ORDER — OXYCODONE AND ACETAMINOPHEN 10; 325 MG/1; MG/1
10-325 TABLET ORAL
Refills: 0 | Status: DISCONTINUED | COMMUNITY
End: 2022-01-03

## 2022-01-03 RX ORDER — FAMOTIDINE 10 MG/1
TABLET, FILM COATED ORAL
Refills: 0 | Status: ACTIVE | COMMUNITY

## 2022-01-03 RX ORDER — EZETIMIBE 10 MG/1
TABLET ORAL
Refills: 0 | Status: DISCONTINUED | COMMUNITY
End: 2022-01-03

## 2022-01-03 NOTE — HISTORY OF PRESENT ILLNESS
[FreeTextEntry1] : 71 y.o. female with Hx of lung adenocarcinoma stage 1, LVATS/wedge resection in 09/27/2021 and moderate mitral valve regurgitation. Patient had CT with IV contrast of chest, abdomen and pelvis on 12/22/2021 with results significant for pericardial effusion which was not present in September on TTE. Patient complains of dyspnea on exertion, denies chest pain, palpitations. Cholesterol is at target. The patient did have left sided CP which worsened with a deep breath in . Repeat echo done here today shows a minimal pericardial effusion localized near the RV . No significant pericardial effusion was noted.\par \par  .

## 2022-01-03 NOTE — CARDIOLOGY SUMMARY
[___] : [unfilled] [de-identified] : 01/03/2022: Sinus bradycardia, possible left atrial enlargement\par 9- NSR NS T wave change.  [de-identified] : 9- ETT Echo completed Stage II No ischedmia  Moderate MR mild TR RVSP was noermal [de-identified] : CT chest/abdomen/pelvis with IV contrast: 11 to 12 mm thick layer of pericardial fluid. New oval nodule LLL, groundglass area in RLL unchanged

## 2022-01-03 NOTE — ASSESSMENT
[FreeTextEntry1] : The patient is s/p LVATS and wedge resection (09/27/2021). She had similar procedure on the RUL .  The patient has her baseline BEASLEY . ETT echo (09/15/2021) was negative for ischemia at moderate exercise load The patient had a CT scan of the chest which showed a small pericardial effusion which is inferolateral. . Repeat echo today showed no signficant pericardial effusion . If anything there is a minimal anterior pericardail space . She did have 4-5 days of left sided pleuritic like CP / This had resolved. She may have had pericardiaitis.

## 2022-01-05 ENCOUNTER — OUTPATIENT (OUTPATIENT)
Dept: OUTPATIENT SERVICES | Facility: HOSPITAL | Age: 72
LOS: 1 days | Discharge: HOME | End: 2022-01-05

## 2022-01-05 VITALS — DIASTOLIC BLOOD PRESSURE: 51 MMHG | SYSTOLIC BLOOD PRESSURE: 106 MMHG

## 2022-01-05 VITALS
HEART RATE: 56 BPM | SYSTOLIC BLOOD PRESSURE: 114 MMHG | RESPIRATION RATE: 17 BRPM | WEIGHT: 93.04 LBS | OXYGEN SATURATION: 100 % | TEMPERATURE: 97 F | HEIGHT: 55 IN | DIASTOLIC BLOOD PRESSURE: 57 MMHG

## 2022-01-05 DIAGNOSIS — Z90.10 ACQUIRED ABSENCE OF UNSPECIFIED BREAST AND NIPPLE: Chronic | ICD-10-CM

## 2022-01-05 DIAGNOSIS — Z98.890 OTHER SPECIFIED POSTPROCEDURAL STATES: Chronic | ICD-10-CM

## 2022-01-05 DIAGNOSIS — Z90.49 ACQUIRED ABSENCE OF OTHER SPECIFIED PARTS OF DIGESTIVE TRACT: Chronic | ICD-10-CM

## 2022-01-05 DIAGNOSIS — Z98.82 BREAST IMPLANT STATUS: Chronic | ICD-10-CM

## 2022-01-05 DIAGNOSIS — Z90.2 ACQUIRED ABSENCE OF LUNG [PART OF]: Chronic | ICD-10-CM

## 2022-01-05 RX ORDER — OLOPATADINE HYDROCHLORIDE 1 MG/ML
1 SOLUTION/ DROPS OPHTHALMIC
Qty: 0 | Refills: 0 | DISCHARGE

## 2022-01-05 NOTE — ASU DISCHARGE PLAN (ADULT/PEDIATRIC) - NS MD DC FALL RISK RISK
For information on Fall & Injury Prevention, visit: [email or web address] OR  [email or web address] OR  https://www.cdc.gov/steadi/patient.html

## 2022-01-05 NOTE — ASU PATIENT PROFILE, ADULT - FALL HARM RISK - UNIVERSAL INTERVENTIONS
Bed in lowest position, wheels locked, appropriate side rails in place/Call bell, personal items and telephone in reach/Instruct patient to call for assistance before getting out of bed or chair/Non-slip footwear when patient is out of bed/[place] to call system/Physically safe environment - no spills, clutter or unnecessary equipment/Purposeful Proactive Rounding/Room/bathroom lighting operational, light cord in reach

## 2022-01-07 DIAGNOSIS — Z88.0 ALLERGY STATUS TO PENICILLIN: ICD-10-CM

## 2022-01-07 DIAGNOSIS — H26.9 UNSPECIFIED CATARACT: ICD-10-CM

## 2022-01-07 DIAGNOSIS — Z85.3 PERSONAL HISTORY OF MALIGNANT NEOPLASM OF BREAST: ICD-10-CM

## 2022-01-07 DIAGNOSIS — E78.00 PURE HYPERCHOLESTEROLEMIA, UNSPECIFIED: ICD-10-CM

## 2022-01-10 ENCOUNTER — APPOINTMENT (OUTPATIENT)
Dept: CARDIOLOGY | Facility: CLINIC | Age: 72
End: 2022-01-10

## 2022-01-11 ENCOUNTER — APPOINTMENT (OUTPATIENT)
Dept: CARDIOTHORACIC SURGERY | Facility: CLINIC | Age: 72
End: 2022-01-11
Payer: MEDICARE

## 2022-01-11 PROCEDURE — 99213 OFFICE O/P EST LOW 20 MIN: CPT | Mod: 95

## 2022-01-12 ENCOUNTER — OUTPATIENT (OUTPATIENT)
Dept: OUTPATIENT SERVICES | Facility: HOSPITAL | Age: 72
LOS: 1 days | Discharge: HOME | End: 2022-01-12

## 2022-01-12 VITALS
HEART RATE: 60 BPM | OXYGEN SATURATION: 99 % | SYSTOLIC BLOOD PRESSURE: 102 MMHG | WEIGHT: 93.04 LBS | DIASTOLIC BLOOD PRESSURE: 53 MMHG | HEIGHT: 59 IN | TEMPERATURE: 96 F | RESPIRATION RATE: 18 BRPM

## 2022-01-12 VITALS — SYSTOLIC BLOOD PRESSURE: 98 MMHG | HEART RATE: 57 BPM | DIASTOLIC BLOOD PRESSURE: 53 MMHG | RESPIRATION RATE: 17 BRPM

## 2022-01-12 DIAGNOSIS — Z98.41 CATARACT EXTRACTION STATUS, RIGHT EYE: Chronic | ICD-10-CM

## 2022-01-12 DIAGNOSIS — Z90.49 ACQUIRED ABSENCE OF OTHER SPECIFIED PARTS OF DIGESTIVE TRACT: Chronic | ICD-10-CM

## 2022-01-12 DIAGNOSIS — Z90.2 ACQUIRED ABSENCE OF LUNG [PART OF]: Chronic | ICD-10-CM

## 2022-01-12 DIAGNOSIS — Z98.82 BREAST IMPLANT STATUS: Chronic | ICD-10-CM

## 2022-01-12 DIAGNOSIS — Z98.890 OTHER SPECIFIED POSTPROCEDURAL STATES: Chronic | ICD-10-CM

## 2022-01-12 DIAGNOSIS — Z90.10 ACQUIRED ABSENCE OF UNSPECIFIED BREAST AND NIPPLE: Chronic | ICD-10-CM

## 2022-01-12 NOTE — ASU PATIENT PROFILE, ADULT - NSICDXPASTSURGICALHX_GEN_ALL_CORE_FT
PAST SURGICAL HISTORY:  H/O bilateral breast implants     History of appendectomy     History of lobectomy of lung 2/7/2020: right upper lobe wedge and right upper lobe completion lobectomy of levels 9, 11, 4, 7, and 12 lymph nodes    History of lung surgery     History of mastectomy right    S/P breast reconstruction, right rigth mastectomy    S/P cataract surgery, right

## 2022-01-12 NOTE — CHART NOTE - NSCHARTNOTEFT_GEN_A_CORE
PACU ANESTHESIA ADMISSION NOTE      Procedure: Left Eye Cataract Extraction with IOL  Post op diagnosis:  Left Eye Cataract    ____  Intubated  TV:______       Rate: ______      FiO2: ______    __x__  Patent Airway    __x__  Full return of protective reflexes    __x__  Full recovery from anesthesia / back to baseline     Vitals:   T:     97      R:  16                BP:    126/59              Sat:       98            P: 58      Mental Status:  __x__ Awake   _____ Alert   _____ Drowsy   _____ Sedated    Nausea/Vomiting:  __x__ NO  ______Yes,   See Post - Op Orders          Pain Scale (0-10):  _0____    Treatment: ____ None    ___x_ See Post - Op/PCA Orders    Post - Operative Fluids:   ____ Oral   ___x_ See Post - Op Orders    Plan: Discharge:   _X___Home       _____Floor     _____Critical Care    _____  Other:_________________    Comments:

## 2022-01-12 NOTE — ASU PATIENT PROFILE, ADULT - FALL HARM RISK - HARM RISK INTERVENTIONS

## 2022-01-14 NOTE — DATA REVIEWED
[FreeTextEntry1] : CT Chest with IV Contrast 12/22/2022 @ [facility]:\par CT CHEST WITH IV CONTRAST\par CLINICAL INDICATION: Left lung cancer September 2021 resected. Right lung cancer February 2020, right upper lobectomy. Right breast cancer 2000 right mastectomy, tamoxifen. 40 pack-year smoker, quit 2015.\par  \par COMPARISON: CT dated 9/9/2021\par  \par TECHNIQUE: Contrast enhanced CT of the chest was performed.  Multiplanar CT and HRCT images were reviewed.  80  cc of nonionic Isovue-300 IV contrast was administered.\par  \par  \par FINDINGS: \par  \par LUNGS, AIRWAYS: Trachea, left-sided airways are intact. The patient has undergone right upper lobectomy.\par -Patient has undergone sublobar resection in the left upper lobe where a previously present anteriorly located groundglass area has been removed. Chain sutures are present in this region.\par -In the right lower lobe image 129 series 7 there is a 4 mm groundglass area present, unchanged since previously.\par -3 mm oval nodule left lower lobe image 214 series 7, not identified previously.\par There is no lung mass, no suspicious nodule..\par  \par PLEURA: There is a single new focus of left pleural calcification in the mid lung posteriorly, not seen previously. Right lower lobe pleural calcification is stable..\par  \par MEDIASTINUM, JESUS, LYMPH NODES: There is no mediastinal adenopathy nor mass. No hilar mass.\par  \par HEART AND VESSELS: The heart is not enlarged. There is an 11 to 12 mm thick layer of pericardial fluid abutting the left heart border. This was not present in August 2020 and not seen in September 2021. The aorta and pulmonary artery are unremarkable.\par  \par UPPER ABDOMEN: Please refer to CT abdomen performed the same day and reported separately. Pancreatic cyst present.\par  \par BONES AND SOFT TISSUES: Bilateral breast implants, no right breast tissue present. Right axillary surgical clips present. No axillary adenopathy. There is no aggressive bony finding.\par  \par LOWER NECK: No thyroid abnormality.\par  \par Electronic Signature: I personally reviewed the images and agree with this report. Final Report: Dictated by  and Signed by Attending Prem Warren MD 12/22/2021 4:18 PM\par  \par IMPRESSION: \par  \par RESECTION OF LEFT UPPER LOBE GROUNDGLASS AREA SINCE STUDY OF 9/9/2021.\par  \par RIGHT UPPER LOBECTOMY.\par  \par BILATERAL BREAST IMPLANTS.\par  \par NEW FINDING OF LEFT-SIDED, INFEROLATERAL PERICARDIAL FLUID.\par  \par PERSISTENT RIGHT LOWER LOBE GROUNDGLASS NODULE.\par  \par NEW 3 MM OVAL NODULE LEFT LOWER LOBE IMAGE 214 SERIES 7, MAY BE POSTOPERATIVE AND INFLAMMATORY. OTHER SMALL NODULAR FOCI ARE SEEN IN THE VICINITY.
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
## 2022-01-14 NOTE — REVIEW OF SYSTEMS
[Negative] : Endocrine [Fever] : no fever [Feeling Poorly] : not feeling poorly [Feeling Tired] : not feeling tired [Joint Swelling] : no joint swelling [Joint Stiffness] : no joint stiffness [Limb Pain] : no limb pain [Limb Swelling] : no limb swelling [Confused] : no confusion [Convulsions] : no convulsions [Dizziness] : no dizziness [Fainting] : no fainting [Suicidal] : not suicidal [Sleep Disturbances] : no sleep disturbances [Anxiety] : no anxiety [Depression] : no depression [Easy Bleeding] : no tendency for easy bleeding [Easy Bruising] : no tendency for easy bruising [Swollen Glands] : no swollen glands [Swollen Glands In The Neck] : no swollen glands in the neck [FreeTextEntry9] : Nerve pain along incisions much improved
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
## 2022-01-17 DIAGNOSIS — Z86.718 PERSONAL HISTORY OF OTHER VENOUS THROMBOSIS AND EMBOLISM: ICD-10-CM

## 2022-01-17 DIAGNOSIS — H26.102 UNSPECIFIED TRAUMATIC CATARACT, LEFT EYE: ICD-10-CM

## 2022-01-17 DIAGNOSIS — Z98.41 CATARACT EXTRACTION STATUS, RIGHT EYE: ICD-10-CM

## 2022-01-17 DIAGNOSIS — Z96.1 PRESENCE OF INTRAOCULAR LENS: ICD-10-CM

## 2022-01-17 DIAGNOSIS — Z90.2 ACQUIRED ABSENCE OF LUNG [PART OF]: ICD-10-CM

## 2022-01-17 DIAGNOSIS — E78.00 PURE HYPERCHOLESTEROLEMIA, UNSPECIFIED: ICD-10-CM

## 2022-01-17 DIAGNOSIS — Z85.3 PERSONAL HISTORY OF MALIGNANT NEOPLASM OF BREAST: ICD-10-CM

## 2022-01-17 DIAGNOSIS — Z90.11 ACQUIRED ABSENCE OF RIGHT BREAST AND NIPPLE: ICD-10-CM

## 2022-01-17 DIAGNOSIS — Z87.891 PERSONAL HISTORY OF NICOTINE DEPENDENCE: ICD-10-CM

## 2022-01-17 DIAGNOSIS — H26.9 UNSPECIFIED CATARACT: ICD-10-CM

## 2022-01-17 DIAGNOSIS — Z88.0 ALLERGY STATUS TO PENICILLIN: ICD-10-CM

## 2022-01-17 DIAGNOSIS — Z85.118 PERSONAL HISTORY OF OTHER MALIGNANT NEOPLASM OF BRONCHUS AND LUNG: ICD-10-CM

## 2022-02-03 ENCOUNTER — APPOINTMENT (OUTPATIENT)
Age: 72
End: 2022-02-03

## 2022-03-01 ENCOUNTER — APPOINTMENT (OUTPATIENT)
Age: 72
End: 2022-03-01
Payer: MEDICARE

## 2022-03-01 VITALS
SYSTOLIC BLOOD PRESSURE: 110 MMHG | BODY MASS INDEX: 18.55 KG/M2 | HEART RATE: 78 BPM | DIASTOLIC BLOOD PRESSURE: 60 MMHG | WEIGHT: 92 LBS | HEIGHT: 59 IN | OXYGEN SATURATION: 98 %

## 2022-03-01 PROCEDURE — 99213 OFFICE O/P EST LOW 20 MIN: CPT

## 2022-03-01 NOTE — PHYSICAL EXAM
[No Acute Distress] : no acute distress [Normal Oropharynx] : normal oropharynx [Normal Appearance] : normal appearance [No Neck Mass] : no neck mass [Normal Rate/Rhythm] : normal rate/rhythm [Normal S1, S2] : normal s1, s2 [No Murmurs] : no murmurs [No Resp Distress] : no resp distress [Clear to Auscultation Bilaterally] : clear to auscultation bilaterally [No Clubbing] : no clubbing [No Abnormalities] : no abnormalities [No Cyanosis] : no cyanosis [No Edema] : no edema [FROM] : FROM [Normal Color/ Pigmentation] : normal color/ pigmentation [No Focal Deficits] : no focal deficits [Oriented x3] : oriented x3 [Normal Affect] : normal affect

## 2022-03-01 NOTE — REVIEW OF SYSTEMS
[Cough] : no cough [Dyspnea] : no dyspnea [SOB on Exertion] : no sob on exertion [Negative] : Endocrine

## 2022-03-02 ENCOUNTER — APPOINTMENT (OUTPATIENT)
Dept: CARDIOLOGY | Facility: CLINIC | Age: 72
End: 2022-03-02

## 2022-04-13 LAB
ALBUMIN SERPL ELPH-MCNC: 4.7 G/DL
ALP BLD-CCNC: 56 U/L
ALT SERPL-CCNC: 23 U/L
ANION GAP SERPL CALC-SCNC: 14 MMOL/L
AST SERPL-CCNC: 25 U/L
BASOPHILS # BLD AUTO: 0.07 K/UL
BASOPHILS NFR BLD AUTO: 1.8 %
BILIRUB SERPL-MCNC: 0.8 MG/DL
BUN SERPL-MCNC: 13 MG/DL
CALCIUM SERPL-MCNC: 9.3 MG/DL
CHLORIDE SERPL-SCNC: 107 MMOL/L
CHOLEST SERPL-MCNC: 177 MG/DL
CO2 SERPL-SCNC: 25 MMOL/L
CREAT SERPL-MCNC: 1 MG/DL
CRP SERPL-MCNC: <3 MG/L
DSDNA AB SER-ACNC: <12 IU/ML
EGFR: 61 ML/MIN/1.73M2
EOSINOPHIL # BLD AUTO: 0.17 K/UL
EOSINOPHIL NFR BLD AUTO: 4.4 %
ERYTHROCYTE [SEDIMENTATION RATE] IN BLOOD BY WESTERGREN METHOD: 6 MM/HR
GLUCOSE SERPL-MCNC: 91 MG/DL
HCT VFR BLD CALC: 41.1 %
HDLC SERPL-MCNC: 79 MG/DL
HGB BLD-MCNC: 13.6 G/DL
IMM GRANULOCYTES NFR BLD AUTO: 0.3 %
LDLC SERPL CALC-MCNC: 81 MG/DL
LYMPHOCYTES # BLD AUTO: 1.29 K/UL
LYMPHOCYTES NFR BLD AUTO: 33.6 %
MAN DIFF?: NORMAL
MCHC RBC-ENTMCNC: 32.7 PG
MCHC RBC-ENTMCNC: 33.1 G/DL
MCV RBC AUTO: 98.8 FL
MONOCYTES # BLD AUTO: 0.28 K/UL
MONOCYTES NFR BLD AUTO: 7.3 %
NEUTROPHILS # BLD AUTO: 2.02 K/UL
NEUTROPHILS NFR BLD AUTO: 52.6 %
NONHDLC SERPL-MCNC: 98 MG/DL
PLATELET # BLD AUTO: 227 K/UL
POTASSIUM SERPL-SCNC: 5.3 MMOL/L
PROT SERPL-MCNC: 6.7 G/DL
RBC # BLD: 4.16 M/UL
RBC # FLD: 12.3 %
SODIUM SERPL-SCNC: 146 MMOL/L
T4 FREE SERPL-MCNC: 1.3 NG/DL
TRIGL SERPL-MCNC: 85 MG/DL
TSH SERPL-ACNC: 3.19 UIU/ML
WBC # FLD AUTO: 3.84 K/UL

## 2022-04-24 LAB
ANA SER IF-ACNC: NEGATIVE
COX SACKIE TYPE A-24: ABNORMAL TITER
CV A16 AB FLD-ACNC: ABNORMAL TITER
CV A7 AB FLD-ACNC: ABNORMAL TITER
CV A9 AB FLD-ACNC: ABNORMAL TITER
CV B1 AB FLD QL: ABNORMAL
CV B2 AB FLD QL: ABNORMAL
CV B3 AB FLD QL: ABNORMAL
CV B4 AB FLD QL: ABNORMAL
CV B5 AB FLD QL: ABNORMAL
CV B6 AB FLD QL: ABNORMAL

## 2022-04-25 ENCOUNTER — APPOINTMENT (OUTPATIENT)
Dept: CARDIOLOGY | Facility: CLINIC | Age: 72
End: 2022-04-25
Payer: MEDICARE

## 2022-04-25 VITALS — TEMPERATURE: 97.6 F | BODY MASS INDEX: 19.15 KG/M2 | WEIGHT: 95 LBS | HEIGHT: 59 IN

## 2022-04-25 VITALS — DIASTOLIC BLOOD PRESSURE: 64 MMHG | HEART RATE: 55 BPM | SYSTOLIC BLOOD PRESSURE: 110 MMHG

## 2022-04-25 DIAGNOSIS — N32.81 OVERACTIVE BLADDER: ICD-10-CM

## 2022-04-25 DIAGNOSIS — R93.89 ABNORMAL FINDINGS ON DIAGNOSTIC IMAGING OF OTHER SPECIFIED BODY STRUCTURES: ICD-10-CM

## 2022-04-25 PROCEDURE — 93000 ELECTROCARDIOGRAM COMPLETE: CPT

## 2022-04-25 PROCEDURE — 99214 OFFICE O/P EST MOD 30 MIN: CPT

## 2022-04-25 RX ORDER — BUDESONIDE AND FORMOTEROL FUMARATE DIHYDRATE 80; 4.5 UG/1; UG/1
80-4.5 AEROSOL RESPIRATORY (INHALATION) TWICE DAILY
Qty: 1 | Refills: 0 | Status: DISCONTINUED | COMMUNITY
Start: 2021-10-27 | End: 2022-04-25

## 2022-04-25 NOTE — CARDIOLOGY SUMMARY
[___] : [unfilled] [de-identified] : 01/03/2022: Sinus bradycardia, possible left atrial enlargement\par 9- NSR NS T wave change. \par 4- Sinus Bradycardai  [de-identified] : 9- ETT Echo completed Stage II No ischedmia  Moderate MR mild TR RVSP was noermal [de-identified] : CT chest/abdomen/pelvis with IV contrast: 11 to 12 mm thick layer of pericardial fluid. New oval nodule LLL, groundglass area in RLL unchanged

## 2022-04-25 NOTE — ASSESSMENT
[FreeTextEntry1] : The patient is s/p LVATS and wedge resection (09/27/2021). She had similar procedure on the RUL .  The patient has her baseline BEASLEY . ETT echo (09/15/2021) was negative for ischemia at moderate exercise load The patient had a CT scan of the chest which showed a small pericardial effusion which is inferolateral. . Repeat echo in January  showed no signficant pericardial effusion . If anything there is a minimal anterior pericardail space . She did have 4-5 days of left sided pleuritic like CP / This had resolved. She may have had pericardiaitis. She had Cockaxie titers which were high . ESR 3 mnths latera was normal and her pleuritic pain has completely resolved. She is going for a repeat CT scan of the chest in June .

## 2022-04-25 NOTE — HISTORY OF PRESENT ILLNESS
[FreeTextEntry1] : 71 y.o. female with Hx of lung adenocarcinoma stage 1, LVATS/wedge resection in 09/27/2021 and moderate mitral valve regurgitation. Patient had CT with IV contrast of chest, abdomen and pelvis on 12/22/2021 with results significant for pericardial effusion which was not present in September on TTE. Patient complains of dyspnea on exertion, denies chest pain, palpitations. Cholesterol is at target. The patient did have left sided CP which worsened with a deep breath in . Repeat echo done here today shows a minimal pericardial effusion localized near the RV . No significant pericardial effusion was noted.The patient had increased Coxackie viral titers . She has not further pleuritic symptoms \par \par  .

## 2022-05-24 NOTE — PATIENT PROFILE ADULT - VISION (WITH CORRECTIVE LENSES IF THE PATIENT USUALLY WEARS THEM):
Continue Regimen: Fluocinonide 0.05% ointment applied to AA BID PRN flares\\n\\nFluocinonide 0.05% solution applied to the scalp QD PRN flares Render In Strict Bullet Format?: No Detail Level: Zone Normal vision: sees adequately in most situations; can see medication labels, newsprint

## 2022-06-11 ENCOUNTER — NON-APPOINTMENT (OUTPATIENT)
Age: 72
End: 2022-06-11

## 2022-06-28 ENCOUNTER — NON-APPOINTMENT (OUTPATIENT)
Age: 72
End: 2022-06-28

## 2022-07-05 ENCOUNTER — APPOINTMENT (OUTPATIENT)
Dept: ORTHOPEDIC SURGERY | Facility: CLINIC | Age: 72
End: 2022-07-05

## 2022-07-05 VITALS — BODY MASS INDEX: 19.15 KG/M2 | HEIGHT: 59 IN | WEIGHT: 95 LBS

## 2022-07-05 PROCEDURE — 20610 DRAIN/INJ JOINT/BURSA W/O US: CPT | Mod: RT

## 2022-07-05 PROCEDURE — 99203 OFFICE O/P NEW LOW 30 MIN: CPT | Mod: 25

## 2022-07-05 NOTE — DISCUSSION/SUMMARY
[de-identified] :   At this time impression is pain and swelling of the knee due to osteoarthritis, this time there is no locking of the knee and on x-ray the loose  body  don't look like they are in the joint.\par  I offer a cortisone injection for her pain, patient agrees.  \par Patient was advised to follow up with our knee specialist, appointment was given.

## 2022-07-05 NOTE — IMAGING
[de-identified] :   On examination of the right knee, patient has mild swelling, no ecchymosis, no erythema.  \par Patient has full range of motion to the knee to flexion-extension of the knee, no locking or crepitation of the knee.  \par Patient has tenderness to palpation over the medial joint line.  \par Equivocal Rolando's.\par  no signs of instability about the knee.  \par Neurovascular intact.\par \par   X-[facility] of the [facility] and brought the x-ray in a CD and reviewed in office today was negative for any acute fracture dislocation, and shows significant osteoarthritis of the knee.  \par There is also loose bodies observed.  \par \par \par  MRI of the right knee  was done, report was brought by the patient, the impression is tricompartmental osteoarthritis with chondral wear and patellofemoral narrowing.  There are multiple intra-articular osteochondral bodies.  \par Subtle meniscal fraying noted.

## 2022-07-05 NOTE — PROCEDURE
[Large Joint Injection] : Large joint injection [Right] : of the right [Knee] : knee [Pain] : pain [Alcohol] : alcohol [Betadine] : betadine [Ethyl Chloride sprayed topically] : ethyl chloride sprayed topically [Sterile technique used] : sterile technique used [___ cc    2%] : Lidocaine ~Vcc of 2%  [___ cc    4mg] : Dexamethasone (Decadron) ~Vcc of 4 mg  [] : Patient tolerated procedure well

## 2022-07-05 NOTE — HISTORY OF PRESENT ILLNESS
[de-identified] :   Patient is here for an evaluation of the right knee, patient states that the pain is started on about June 27, 2022, patient states that the she does not know a she twisted her knee, but she has been has severe pain and she is be have been pain with locking of the knee.  \par Patient states that her medical doctor refer her for an MRI of the right knee and she was advised of the loose bodies at and she is here for an evaluation.

## 2022-08-09 ENCOUNTER — APPOINTMENT (OUTPATIENT)
Dept: ORTHOPEDIC SURGERY | Facility: CLINIC | Age: 72
End: 2022-08-09

## 2022-08-16 ENCOUNTER — APPOINTMENT (OUTPATIENT)
Age: 72
End: 2022-08-16

## 2022-08-16 PROCEDURE — 94010 BREATHING CAPACITY TEST: CPT

## 2022-08-16 PROCEDURE — 94729 DIFFUSING CAPACITY: CPT

## 2022-08-16 PROCEDURE — 94727 GAS DIL/WSHOT DETER LNG VOL: CPT

## 2022-08-16 PROCEDURE — 99213 OFFICE O/P EST LOW 20 MIN: CPT | Mod: 25

## 2022-10-02 LAB
ALBUMIN SERPL ELPH-MCNC: 4.5 G/DL
ALP BLD-CCNC: 53 U/L
ALT SERPL-CCNC: 25 U/L
ANION GAP SERPL CALC-SCNC: 10 MMOL/L
AST SERPL-CCNC: 26 U/L
BASOPHILS # BLD AUTO: 0.06 K/UL
BASOPHILS NFR BLD AUTO: 1.2 %
BILIRUB SERPL-MCNC: 0.6 MG/DL
BUN SERPL-MCNC: 16 MG/DL
CALCIUM SERPL-MCNC: 9.4 MG/DL
CHLORIDE SERPL-SCNC: 105 MMOL/L
CHOLEST SERPL-MCNC: 152 MG/DL
CO2 SERPL-SCNC: 27 MMOL/L
CREAT SERPL-MCNC: 1 MG/DL
EGFR: 60 ML/MIN/1.73M2
EOSINOPHIL # BLD AUTO: 0.26 K/UL
EOSINOPHIL NFR BLD AUTO: 5.3 %
GLUCOSE SERPL-MCNC: 105 MG/DL
HCT VFR BLD CALC: 42 %
HDLC SERPL-MCNC: 64 MG/DL
HGB BLD-MCNC: 13.9 G/DL
IMM GRANULOCYTES NFR BLD AUTO: 0.2 %
LDLC SERPL CALC-MCNC: 69 MG/DL
LYMPHOCYTES # BLD AUTO: 1.8 K/UL
LYMPHOCYTES NFR BLD AUTO: 36.7 %
MAN DIFF?: NORMAL
MCHC RBC-ENTMCNC: 32.6 PG
MCHC RBC-ENTMCNC: 33.1 G/DL
MCV RBC AUTO: 98.6 FL
MONOCYTES # BLD AUTO: 0.32 K/UL
MONOCYTES NFR BLD AUTO: 6.5 %
NEUTROPHILS # BLD AUTO: 2.46 K/UL
NEUTROPHILS NFR BLD AUTO: 50.1 %
NONHDLC SERPL-MCNC: 88 MG/DL
PLATELET # BLD AUTO: 221 K/UL
POTASSIUM SERPL-SCNC: 5.3 MMOL/L
PROT SERPL-MCNC: 6.2 G/DL
RBC # BLD: 4.26 M/UL
RBC # FLD: 12.1 %
SODIUM SERPL-SCNC: 142 MMOL/L
TRIGL SERPL-MCNC: 97 MG/DL
WBC # FLD AUTO: 4.91 K/UL

## 2022-10-03 ENCOUNTER — APPOINTMENT (OUTPATIENT)
Dept: CARDIOLOGY | Facility: CLINIC | Age: 72
End: 2022-10-03

## 2022-10-03 VITALS — SYSTOLIC BLOOD PRESSURE: 110 MMHG | DIASTOLIC BLOOD PRESSURE: 70 MMHG | HEART RATE: 62 BPM

## 2022-10-03 VITALS — BODY MASS INDEX: 19.48 KG/M2 | HEIGHT: 59 IN | WEIGHT: 96.6 LBS | TEMPERATURE: 97.6 F

## 2022-10-03 PROCEDURE — 99214 OFFICE O/P EST MOD 30 MIN: CPT

## 2022-10-03 PROCEDURE — 93000 ELECTROCARDIOGRAM COMPLETE: CPT

## 2022-10-03 RX ORDER — PRAVASTATIN SODIUM 40 MG/1
40 TABLET ORAL
Qty: 90 | Refills: 3 | Status: ACTIVE | COMMUNITY
Start: 1900-01-01 | End: 1900-01-01

## 2022-10-03 RX ORDER — SOLIFENACIN SUCCINATE 10 MG/1
10 TABLET, FILM COATED ORAL
Refills: 0 | Status: DISCONTINUED | COMMUNITY
End: 2022-10-03

## 2022-10-03 RX ORDER — SOLIFENACIN SUCCINATE 5 MG/1
5 TABLET ORAL
Qty: 90 | Refills: 0 | Status: ACTIVE | COMMUNITY
Start: 2022-08-05

## 2022-10-03 RX ORDER — GUAIFENESIN/PSEUDOEPHEDRNE HCL 600-120MG
600-120 TABLET, EXTENDED RELEASE 12 HR ORAL
Refills: 0 | Status: DISCONTINUED | COMMUNITY
End: 2022-10-03

## 2022-10-03 NOTE — CARDIOLOGY SUMMARY
[___] : [unfilled] [de-identified] : 01/03/2022: Sinus bradycardia, possible left atrial enlargement\par 9- NSR NS T wave change. \par 4- Sinus Bradycardai \par 10-3-2022 NSR NS T wave change.  [de-identified] : 9- ETT Echo completed Stage II No ischedmia  Moderate MR mild TR RVSP was noermal [de-identified] : 1-3-2022 Trivial echo free space  [de-identified] : CT chest/abdomen/pelvis with IV contrast: 11 to 12 mm thick layer of pericardial fluid. New oval nodule LLL, groundglass area in RLL unchanged

## 2022-10-03 NOTE — HISTORY OF PRESENT ILLNESS
[FreeTextEntry1] : The patient has been feeling well. She has her baseline BEASLEY . Hx of stage I AdenoCA of the lungs . \par  .

## 2022-10-03 NOTE — ASSESSMENT
[FreeTextEntry1] : The patient had lung resection for CA about a year ago . She had pericardial effusion which had resolved and repeat echo in 1-2022 showed just a trivial pericardial effusion . She has moderate MR . She has her baseline BEASLEY which has remained unchanged . Ischemia work u in the past was negative  LDL is at goal

## 2022-10-15 ENCOUNTER — OUTPATIENT (OUTPATIENT)
Dept: OUTPATIENT SERVICES | Facility: HOSPITAL | Age: 72
LOS: 1 days | Discharge: HOME | End: 2022-10-15

## 2022-10-15 DIAGNOSIS — Z90.2 ACQUIRED ABSENCE OF LUNG [PART OF]: Chronic | ICD-10-CM

## 2022-10-15 DIAGNOSIS — Z98.82 BREAST IMPLANT STATUS: Chronic | ICD-10-CM

## 2022-10-15 DIAGNOSIS — Z12.31 ENCOUNTER FOR SCREENING MAMMOGRAM FOR MALIGNANT NEOPLASM OF BREAST: ICD-10-CM

## 2022-10-15 DIAGNOSIS — Z90.49 ACQUIRED ABSENCE OF OTHER SPECIFIED PARTS OF DIGESTIVE TRACT: Chronic | ICD-10-CM

## 2022-10-15 DIAGNOSIS — Z90.10 ACQUIRED ABSENCE OF UNSPECIFIED BREAST AND NIPPLE: Chronic | ICD-10-CM

## 2022-10-15 DIAGNOSIS — Z98.41 CATARACT EXTRACTION STATUS, RIGHT EYE: Chronic | ICD-10-CM

## 2022-10-15 DIAGNOSIS — Z98.890 OTHER SPECIFIED POSTPROCEDURAL STATES: Chronic | ICD-10-CM

## 2022-10-15 DIAGNOSIS — R92.2 INCONCLUSIVE MAMMOGRAM: ICD-10-CM

## 2022-10-15 PROCEDURE — 76641 ULTRASOUND BREAST COMPLETE: CPT | Mod: 26,LT

## 2022-10-15 PROCEDURE — 77067 SCR MAMMO BI INCL CAD: CPT | Mod: 26,52,LT

## 2022-11-30 ENCOUNTER — NON-APPOINTMENT (OUTPATIENT)
Age: 72
End: 2022-11-30

## 2022-12-13 ENCOUNTER — OUTPATIENT (OUTPATIENT)
Dept: OUTPATIENT SERVICES | Facility: HOSPITAL | Age: 72
LOS: 1 days | Discharge: HOME | End: 2022-12-13

## 2022-12-13 ENCOUNTER — APPOINTMENT (OUTPATIENT)
Dept: PULMONOLOGY | Facility: CLINIC | Age: 72
End: 2022-12-13

## 2022-12-13 VITALS
HEIGHT: 59 IN | SYSTOLIC BLOOD PRESSURE: 130 MMHG | DIASTOLIC BLOOD PRESSURE: 62 MMHG | HEART RATE: 73 BPM | OXYGEN SATURATION: 94 % | WEIGHT: 93 LBS | BODY MASS INDEX: 18.75 KG/M2

## 2022-12-13 DIAGNOSIS — Z90.10 ACQUIRED ABSENCE OF UNSPECIFIED BREAST AND NIPPLE: Chronic | ICD-10-CM

## 2022-12-13 DIAGNOSIS — Z98.890 OTHER SPECIFIED POSTPROCEDURAL STATES: Chronic | ICD-10-CM

## 2022-12-13 DIAGNOSIS — Z98.82 BREAST IMPLANT STATUS: Chronic | ICD-10-CM

## 2022-12-13 DIAGNOSIS — R06.02 SHORTNESS OF BREATH: ICD-10-CM

## 2022-12-13 DIAGNOSIS — Z98.41 CATARACT EXTRACTION STATUS, RIGHT EYE: Chronic | ICD-10-CM

## 2022-12-13 DIAGNOSIS — Z90.2 ACQUIRED ABSENCE OF LUNG [PART OF]: Chronic | ICD-10-CM

## 2022-12-13 DIAGNOSIS — Z90.49 ACQUIRED ABSENCE OF OTHER SPECIFIED PARTS OF DIGESTIVE TRACT: Chronic | ICD-10-CM

## 2022-12-13 LAB — DEPRECATED D DIMER PPP IA-ACNC: <150 NG/ML DDU

## 2022-12-13 PROCEDURE — 71045 X-RAY EXAM CHEST 1 VIEW: CPT | Mod: 26

## 2022-12-13 PROCEDURE — 99214 OFFICE O/P EST MOD 30 MIN: CPT

## 2022-12-13 NOTE — REASON FOR VISIT
[Follow-Up] : a follow-up visit [Lung Cancer] : lung cancer [Shortness of Breath] : shortness of breath [Pulmonary Nodules] : pulmonary nodules

## 2022-12-13 NOTE — REVIEW OF SYSTEMS
[Cough] : no cough [Dyspnea] : dyspnea [SOB on Exertion] : sob on exertion [Negative] : Endocrine Yes

## 2022-12-14 ENCOUNTER — APPOINTMENT (OUTPATIENT)
Dept: CARDIOLOGY | Facility: CLINIC | Age: 72
End: 2022-12-14

## 2022-12-14 VITALS — SYSTOLIC BLOOD PRESSURE: 104 MMHG | HEART RATE: 57 BPM | DIASTOLIC BLOOD PRESSURE: 60 MMHG

## 2022-12-14 VITALS — BODY MASS INDEX: 18.75 KG/M2 | WEIGHT: 93 LBS | HEIGHT: 59 IN | TEMPERATURE: 97.6 F

## 2022-12-14 PROCEDURE — 93000 ELECTROCARDIOGRAM COMPLETE: CPT

## 2022-12-14 PROCEDURE — 99214 OFFICE O/P EST MOD 30 MIN: CPT

## 2022-12-14 RX ORDER — SOLIFENACIN SUCCINATE 10 MG/1
10 TABLET ORAL DAILY
Refills: 0 | Status: DISCONTINUED | COMMUNITY
End: 2022-12-14

## 2022-12-14 NOTE — HISTORY OF PRESENT ILLNESS
[FreeTextEntry1] : The patient has had BEASLEY over the past one month . 2 days ago the patient had fluttering of her chest . Echo done by Dr. Guerrero last month showed normal LV systolic function and no pericardial effusion . The patient had a D Dimer yesterday which was normal . She was given Albuterol and she fle better with this . \par  .

## 2022-12-14 NOTE — ASSESSMENT
[FreeTextEntry1] : The patient has had lung resection for CA . She has  had increased SOB and she saw pulmonary and D Dimer was negative and she was started on Albuterol. Her SOB is better today . The patient had increased palpitations last night described as a fluttering in her chest  . Echo from Dr. Guerrero's office showed normal Lv systolic function and mild MR and no pericaridal effusion .

## 2022-12-15 ENCOUNTER — NON-APPOINTMENT (OUTPATIENT)
Age: 72
End: 2022-12-15

## 2022-12-16 ENCOUNTER — NON-APPOINTMENT (OUTPATIENT)
Age: 72
End: 2022-12-16

## 2022-12-30 ENCOUNTER — OUTPATIENT (OUTPATIENT)
Dept: OUTPATIENT SERVICES | Facility: HOSPITAL | Age: 72
LOS: 1 days | Discharge: HOME | End: 2022-12-30
Payer: MEDICARE

## 2022-12-30 ENCOUNTER — RESULT REVIEW (OUTPATIENT)
Age: 72
End: 2022-12-30

## 2022-12-30 DIAGNOSIS — Z98.890 OTHER SPECIFIED POSTPROCEDURAL STATES: Chronic | ICD-10-CM

## 2022-12-30 DIAGNOSIS — I31.39 OTHER PERICARDIAL EFFUSION (NONINFLAMMATORY): ICD-10-CM

## 2022-12-30 DIAGNOSIS — Z98.82 BREAST IMPLANT STATUS: Chronic | ICD-10-CM

## 2022-12-30 DIAGNOSIS — Z90.49 ACQUIRED ABSENCE OF OTHER SPECIFIED PARTS OF DIGESTIVE TRACT: Chronic | ICD-10-CM

## 2022-12-30 DIAGNOSIS — Z90.2 ACQUIRED ABSENCE OF LUNG [PART OF]: Chronic | ICD-10-CM

## 2022-12-30 DIAGNOSIS — Z98.41 CATARACT EXTRACTION STATUS, RIGHT EYE: Chronic | ICD-10-CM

## 2022-12-30 DIAGNOSIS — Z90.10 ACQUIRED ABSENCE OF UNSPECIFIED BREAST AND NIPPLE: Chronic | ICD-10-CM

## 2022-12-30 PROCEDURE — 78452 HT MUSCLE IMAGE SPECT MULT: CPT | Mod: 26,MH

## 2023-01-02 ENCOUNTER — NON-APPOINTMENT (OUTPATIENT)
Age: 73
End: 2023-01-02

## 2023-01-04 ENCOUNTER — APPOINTMENT (OUTPATIENT)
Dept: CARDIOLOGY | Facility: CLINIC | Age: 73
End: 2023-01-04
Payer: MEDICARE

## 2023-01-04 PROCEDURE — 93351 STRESS TTE COMPLETE: CPT

## 2023-01-05 ENCOUNTER — NON-APPOINTMENT (OUTPATIENT)
Age: 73
End: 2023-01-05

## 2023-01-24 ENCOUNTER — APPOINTMENT (OUTPATIENT)
Dept: PULMONOLOGY | Facility: CLINIC | Age: 73
End: 2023-01-24
Payer: MEDICARE

## 2023-01-24 VITALS
SYSTOLIC BLOOD PRESSURE: 100 MMHG | BODY MASS INDEX: 18.18 KG/M2 | DIASTOLIC BLOOD PRESSURE: 70 MMHG | HEART RATE: 65 BPM | WEIGHT: 90 LBS | RESPIRATION RATE: 14 BRPM | OXYGEN SATURATION: 98 %

## 2023-01-24 PROCEDURE — 99213 OFFICE O/P EST LOW 20 MIN: CPT

## 2023-01-31 ENCOUNTER — APPOINTMENT (OUTPATIENT)
Dept: CARDIOTHORACIC SURGERY | Facility: CLINIC | Age: 73
End: 2023-01-31
Payer: MEDICARE

## 2023-01-31 VITALS
BODY MASS INDEX: 18.14 KG/M2 | DIASTOLIC BLOOD PRESSURE: 64 MMHG | OXYGEN SATURATION: 98 % | HEART RATE: 65 BPM | SYSTOLIC BLOOD PRESSURE: 97 MMHG | HEIGHT: 59 IN | WEIGHT: 90 LBS | RESPIRATION RATE: 14 BRPM

## 2023-01-31 DIAGNOSIS — Z00.00 ENCOUNTER FOR GENERAL ADULT MEDICAL EXAMINATION W/OUT ABNORMAL FINDINGS: ICD-10-CM

## 2023-01-31 PROCEDURE — 99213 OFFICE O/P EST LOW 20 MIN: CPT

## 2023-02-01 ENCOUNTER — APPOINTMENT (OUTPATIENT)
Dept: HEMATOLOGY ONCOLOGY | Facility: CLINIC | Age: 73
End: 2023-02-01
Payer: MEDICARE

## 2023-02-01 ENCOUNTER — OUTPATIENT (OUTPATIENT)
Dept: OUTPATIENT SERVICES | Facility: HOSPITAL | Age: 73
LOS: 1 days | End: 2023-02-01

## 2023-02-01 VITALS
WEIGHT: 91 LBS | HEART RATE: 62 BPM | HEIGHT: 59 IN | TEMPERATURE: 97.1 F | SYSTOLIC BLOOD PRESSURE: 111 MMHG | OXYGEN SATURATION: 98 % | RESPIRATION RATE: 14 BRPM | BODY MASS INDEX: 18.35 KG/M2 | DIASTOLIC BLOOD PRESSURE: 59 MMHG

## 2023-02-01 DIAGNOSIS — Z98.82 BREAST IMPLANT STATUS: Chronic | ICD-10-CM

## 2023-02-01 DIAGNOSIS — Z98.890 OTHER SPECIFIED POSTPROCEDURAL STATES: Chronic | ICD-10-CM

## 2023-02-01 DIAGNOSIS — Z90.49 ACQUIRED ABSENCE OF OTHER SPECIFIED PARTS OF DIGESTIVE TRACT: Chronic | ICD-10-CM

## 2023-02-01 DIAGNOSIS — Z98.41 CATARACT EXTRACTION STATUS, RIGHT EYE: Chronic | ICD-10-CM

## 2023-02-01 DIAGNOSIS — Z90.10 ACQUIRED ABSENCE OF UNSPECIFIED BREAST AND NIPPLE: Chronic | ICD-10-CM

## 2023-02-01 DIAGNOSIS — Z90.2 ACQUIRED ABSENCE OF LUNG [PART OF]: Chronic | ICD-10-CM

## 2023-02-01 PROCEDURE — 99204 OFFICE O/P NEW MOD 45 MIN: CPT

## 2023-02-01 NOTE — REASON FOR VISIT
[Initial Consultation] : an initial consultation [FreeTextEntry2] : History of lung and breast cancer

## 2023-02-01 NOTE — ASSESSMENT
[FreeTextEntry1] : 72 year old female former smoker with early stage bilateral lung cancer ( NSCLC ) , GENARO s/p resection . \par History of breast cancer s/p mastectomy and adjuvant endocrine therapy . \par History of low risk  multifocal side branch IPMN of pancreas on surveillance for > 5 years. \par Plan : patient will continue routine surveillance for development of new cancer in breast or lungs with annual CT chest , mammogram .\par also recommend repeat MRI abdomen and follow up with surgery .\par return for follow p in one year.

## 2023-02-01 NOTE — HISTORY OF PRESENT ILLNESS
[de-identified] : 72 year old white female self referred for medical oncology evaluation . PMH is significant for right breast cancer s/p mastectomy , adjuvant tamoxifen . \par former smoker, with history of pT1c N0 Adenocarcinoma S/P RUL Lobectomy 2/2020 ,   EBONY lingular segmentectomy in  9/27/2021 for PT1a Adenocarcinoma. \par Recent CT chest shows no evidence of disease. \par History of multifocal side branch IPMN , stable MRI on 6/2020 and CT scan 12/2021 . \par COPD with dyspnea on exertion . no recent change in breathing or cough , no weight loss . No abdominal pain . \par Health maintenance : last mammogram oct 2022, colonoscopy 2 - 3 years ago . \par FH : sister with multiple myeloma . father with colon cancer . \par

## 2023-02-01 NOTE — PHYSICAL EXAM
[Normal] : RRR, normal S1S2, no murmurs, rubs, gallops [de-identified] : s/p right mastectomy with implant reconstruction , no evidence of recurrence , left breast s/p implant , no abnormal masses.
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
## 2023-02-03 NOTE — ASSESSMENT
[FreeTextEntry1] : Carlos Young is 72 year female ex-smoker,  s/p RML lobectomy for adenocarcinoma (2/2020) and s/p EBONY lingular segmentectomy - for an enlarging EBONY GGO -pathology -T1a N0 - adenocarcinoma on 9/27/2021. Here today for review of surveillance CT which revealed no reoccurrence. \par \par CT chest reviewed \par Doing well since surgery \par F/U CTS in 6 months with CT chest\par \par I, Tegan Laurent [facility], am acting as scribe for Dr. Paddy Odell \par    \par \par I, Michele Odell saw, examined and reviewed the diagnostic images on patient:  CARLOS YOUNG on 01/31/2023 and agreed with my Nurse Practitioner's clinical note, physical exam findings and treatment plan.\par \par Mrs. Young presented to the office for surveillance follow-up, she has a diagnosis of lung cancer in 2 occasions.  She has undergone right upper lobectomy and left upper lobe lingulectomy.  Patient is doing well, no complaints at today's office.  CT scan reviewed with the patient no evidence of recurrence or no abnormal findings.  Plan surveillance CT scan of the chest in 6 months and return to the office.\par 09/27/21 robotic assisted lingulectomy for T1aN0 lung adenocarcinoma, 11 LNs not involved by tumor, 9 N1s, 2 N2s. ( Dr. Paddy Odell)\par 02/07/20 robotic assisted right upper lobectomy and mediastinal node dissection, pT1cN0 adenocarcinoma (Dr. Campos)

## 2023-02-28 ENCOUNTER — APPOINTMENT (OUTPATIENT)
Dept: SURGERY | Facility: CLINIC | Age: 73
End: 2023-02-28
Payer: MEDICARE

## 2023-02-28 VITALS
HEART RATE: 88 BPM | OXYGEN SATURATION: 98 % | SYSTOLIC BLOOD PRESSURE: 118 MMHG | DIASTOLIC BLOOD PRESSURE: 76 MMHG | BODY MASS INDEX: 19.53 KG/M2 | WEIGHT: 96.9 LBS | TEMPERATURE: 96.9 F | HEIGHT: 59 IN

## 2023-02-28 PROCEDURE — 99204 OFFICE O/P NEW MOD 45 MIN: CPT

## 2023-02-28 NOTE — ASSESSMENT
[FreeTextEntry1] : CARLOS OLEA  is a pleasant 72 year year old woman  who came in 02/28/2023 for IPMN  . She has Dr SHANI MOSLEY as Her PCP.\par \par 72 year old white female self referred for medical oncology evaluation. PMH is significant for right breast cancer s/p mastectomy , adjuvant tamoxifen. \par former smoker, with history of pT1c N0 Adenocarcinoma S/P RUL Lobectomy 2/2020 , EBONY lingular segmentectomy in 9/27/2021 for PT1a Adenocarcinoma. \par Recent CT chest shows no evidence of disease. \par History of multifocal side branch IPMN , stable MRI on 6/2020 and CT scan 12/2021. \par COPD with dyspnea on exertion. no recent change in breathing or cough , no weight loss. No abdominal pain. \par \par 2/28/2023: MRI 2/17/2023 showed stable pancreatic body cystic lesions measured 1.4 and 1.7cm when compared to 6/1/2020 MRI, however grow from 2019 (1.1 and 0.9cm), no PD dilatation or mural nodule. will repeat MRI in one year

## 2023-02-28 NOTE — HISTORY OF PRESENT ILLNESS
[de-identified] : CARLOS OLEA  is a pleasant 72 year year old woman  who came in 02/28/2023 for IPMN  . She has Dr SHANI MOSLEY as Her PCP.\par \par 72 year old white female self referred for medical oncology evaluation. PMH is significant for right breast cancer s/p mastectomy , adjuvant tamoxifen. \par former smoker, with history of pT1c N0 Adenocarcinoma S/P RUL Lobectomy 2/2020 , EBONY lingular segmentectomy in 9/27/2021 for PT1a Adenocarcinoma. \par Recent CT chest shows no evidence of disease. \par History of multifocal side branch IPMN , stable MRI on 6/2020 and CT scan 12/2021. \par COPD with dyspnea on exertion. no recent change in breathing or cough , no weight loss. No abdominal pain. \par

## 2023-02-28 NOTE — CONSULT LETTER
[Dear  ___] : Dear  [unfilled], [Consult Letter:] : I had the pleasure of evaluating your patient, [unfilled]. [Please see my note below.] : Please see my note below. [Consult Closing:] : Thank you very much for allowing me to participate in the care of this patient.  If you have any questions, please do not hesitate to contact me. [Sincerely,] : Sincerely, [FreeTextEntry3] : Toi Kevin MD

## 2023-03-27 ENCOUNTER — APPOINTMENT (OUTPATIENT)
Dept: CARDIOLOGY | Facility: CLINIC | Age: 73
End: 2023-03-27
Payer: MEDICARE

## 2023-03-27 PROCEDURE — 93306 TTE W/DOPPLER COMPLETE: CPT

## 2023-03-31 NOTE — PRE-ANESTHESIA EVALUATION ADULT - NSATTENDATTESTRD_GEN_ALL_CORE
The patient has been re-examined and I agree with the above assessment or I updated with my findings.
Foreign body ingestion

## 2023-04-05 ENCOUNTER — APPOINTMENT (OUTPATIENT)
Dept: CARDIOLOGY | Facility: CLINIC | Age: 73
End: 2023-04-05
Payer: MEDICARE

## 2023-04-05 VITALS
SYSTOLIC BLOOD PRESSURE: 102 MMHG | HEART RATE: 62 BPM | DIASTOLIC BLOOD PRESSURE: 60 MMHG | WEIGHT: 92 LBS | HEIGHT: 59 IN | BODY MASS INDEX: 18.55 KG/M2

## 2023-04-05 PROCEDURE — 99214 OFFICE O/P EST MOD 30 MIN: CPT

## 2023-04-05 PROCEDURE — 93000 ELECTROCARDIOGRAM COMPLETE: CPT

## 2023-04-05 NOTE — ASSESSMENT
[FreeTextEntry1] : The patient has BEASLEY which I suspect is multifactorial. The patient has had no chest pain . Nuclear stress test was negative for ischemia and echo showed normal LV systolic function with mod MR with Mild MVP . The patient has no pulmonary HTN and IVC is nrmal with normal collapse  She states it does get better with Albuterol. No significant arrhythmia on MCOT

## 2023-04-05 NOTE — HISTORY OF PRESENT ILLNESS
[FreeTextEntry1] : The patient has had some SOB . The patient had nuclear stress test which was negative for ischemia . Echo shows MVP with moderate MR . The patient is being followed by Dr. Kevin for his pacreatic cystic lesion  \par  .

## 2023-04-05 NOTE — CARDIOLOGY SUMMARY
[___] : [unfilled] [de-identified] : 01/03/2022: Sinus bradycardia, possible left atrial enlargement\par 9- NSR NS T wave change. \par 4- Sinus Bradycardai \par 10-3-2022 NSR NS T wave change. \par 4-5-2023 NSR NS  Twave change.  [de-identified] : 1-2023 MCOT no significant arrhythmias  [de-identified] : 9- ETT Echo completed Stage II No ischedmia  Moderate MR mild TR RVSP was noermal\par  Lexiscan stress test No ischemia .  [de-identified] : 1-3-2022 Trivial echo free space \par 12- Normal LV systolic fucntion \par Trivial pericarddial effusion  mild MVP mild TR mod MR Normal RVSP /IVC and IVC collapse \par  [de-identified] : CT chest/abdomen/pelvis with IV contrast: 11 to 12 mm thick layer of pericardial fluid. New oval nodule LLL, groundglass area in RLL unchanged

## 2023-05-17 NOTE — DISCHARGE NOTE PROVIDER - YES NO FOR MLM POSITIVE OR NEGATIVE COVID RESULT
Called pt at [phone] to inform of I71 delays LVM  
Informed patient of advice per Dr. Caesar Mondragon. States he will be calling Ortho to schedule and was aware of medications sent.
,

## 2023-05-30 ENCOUNTER — APPOINTMENT (OUTPATIENT)
Dept: VASCULAR SURGERY | Facility: CLINIC | Age: 73
End: 2023-05-30
Payer: MEDICARE

## 2023-05-30 VITALS
WEIGHT: 90 LBS | HEIGHT: 59 IN | DIASTOLIC BLOOD PRESSURE: 73 MMHG | HEART RATE: 69 BPM | SYSTOLIC BLOOD PRESSURE: 114 MMHG | BODY MASS INDEX: 18.14 KG/M2

## 2023-05-30 DIAGNOSIS — I70.213 ATHEROSCLEROSIS OF NATIVE ARTERIES OF EXTREMITIES WITH INTERMITTENT CLAUDICATION, BILATERAL LEGS: ICD-10-CM

## 2023-05-30 PROCEDURE — 99212 OFFICE O/P EST SF 10 MIN: CPT

## 2023-05-30 NOTE — HISTORY OF PRESENT ILLNESS
[FreeTextEntry1] : 71 y/o female with h/o smoking, had an arterial duplex on 5/4/23 that showed PVD and c/o coldness to feet and hands, denies any pain, or h/o ulcerations, denies any claudications, has h/o lung cancer, s/p RUL lobectomy and c/o dyspnea on ambulation.

## 2023-05-30 NOTE — ASSESSMENT
[FreeTextEntry1] : 73 y/o female presents with coldness to feet and hands, denies any pain, or h/o ulcerations, denies any claudications, has h/o lung cancer, s/p RUL lobectomy and dyspnea limits ambulation.\par \par She has easily palpable pulses in the upper and lower extremities bilaterally.\par \par No vascular surgical intervention needed.\par \par Follow up as needed.

## 2023-06-15 ENCOUNTER — APPOINTMENT (OUTPATIENT)
Dept: PULMONOLOGY | Facility: HOSPITAL | Age: 73
End: 2023-06-15

## 2023-07-10 NOTE — H&P PST ADULT - IS PATIENT PREGNANT?
[FreeTextEntry1] : follow up labs, labs drawn in office \par reviewed importance of healthy lifestyles-- diet, exercise \par reviewed labs from last visit-- cbc, comp, tsh, a1c, lipid, psa (high chol, prediabetes, normal ascvd risk)\par encouraged compliance with meds \par reviewed urology note and recs with patient-- interested in repeat psa testing today 
lmp 2004

## 2023-07-12 ENCOUNTER — NON-APPOINTMENT (OUTPATIENT)
Age: 73
End: 2023-07-12

## 2023-07-19 ENCOUNTER — OUTPATIENT (OUTPATIENT)
Dept: OUTPATIENT SERVICES | Facility: HOSPITAL | Age: 73
LOS: 1 days | End: 2023-07-19
Payer: MEDICARE

## 2023-07-19 ENCOUNTER — RESULT REVIEW (OUTPATIENT)
Age: 73
End: 2023-07-19

## 2023-07-19 DIAGNOSIS — Z98.82 BREAST IMPLANT STATUS: Chronic | ICD-10-CM

## 2023-07-19 DIAGNOSIS — Z98.890 OTHER SPECIFIED POSTPROCEDURAL STATES: Chronic | ICD-10-CM

## 2023-07-19 DIAGNOSIS — Z90.2 ACQUIRED ABSENCE OF LUNG [PART OF]: Chronic | ICD-10-CM

## 2023-07-19 DIAGNOSIS — Z90.10 ACQUIRED ABSENCE OF UNSPECIFIED BREAST AND NIPPLE: Chronic | ICD-10-CM

## 2023-07-19 DIAGNOSIS — Z00.8 ENCOUNTER FOR OTHER GENERAL EXAMINATION: ICD-10-CM

## 2023-07-19 DIAGNOSIS — Z90.49 ACQUIRED ABSENCE OF OTHER SPECIFIED PARTS OF DIGESTIVE TRACT: Chronic | ICD-10-CM

## 2023-07-19 DIAGNOSIS — Z98.41 CATARACT EXTRACTION STATUS, RIGHT EYE: Chronic | ICD-10-CM

## 2023-07-19 DIAGNOSIS — R07.9 CHEST PAIN, UNSPECIFIED: ICD-10-CM

## 2023-07-19 PROCEDURE — 71250 CT THORAX DX C-: CPT | Mod: 26,MH

## 2023-07-19 PROCEDURE — 71250 CT THORAX DX C-: CPT

## 2023-07-20 DIAGNOSIS — R07.9 CHEST PAIN, UNSPECIFIED: ICD-10-CM

## 2023-07-21 ENCOUNTER — APPOINTMENT (OUTPATIENT)
Dept: ORTHOPEDIC SURGERY | Facility: CLINIC | Age: 73
End: 2023-07-21
Payer: MEDICARE

## 2023-07-21 PROCEDURE — 99213 OFFICE O/P EST LOW 20 MIN: CPT

## 2023-07-21 NOTE — HISTORY OF PRESENT ILLNESS
[de-identified] : Patient is a 72-year-old female here for reevaluation of right knee osteoarthritis.  Patient states she has long history of osteoarthritis of right knee and has been treated in the past by orthopedics.  Patient states she has had cortisone injections, gel injections.  Patient states most recent gel injection was a couple weeks ago and has not been improving.  Patient interested in total knee replacement.

## 2023-07-21 NOTE — PHYSICAL EXAM
[Right] : right knee [5___] : hamstring 5[unfilled]/5 [] : light touch is intact throughout [TWNoteComboBox7] : flexion 125 degrees [de-identified] : extension 0 degrees

## 2023-07-21 NOTE — DISCUSSION/SUMMARY
[de-identified] : Discussed prior x-rays with patient showing osteoarthritis of right knee.  Discussed treatment option detail including rest, ice/heat, range of motion exercise, physical therapy, total knee replacement.  Patient is interested in full knee replacement.  Discussed with patient since she just had a gel injection we cannot perform surgery for at least another 4 months.  Patient will follow-up in 2 to 3 months for surgical consult.  Call if any questions or concerns.

## 2023-07-25 ENCOUNTER — APPOINTMENT (OUTPATIENT)
Dept: CARDIOTHORACIC SURGERY | Facility: CLINIC | Age: 73
End: 2023-07-25
Payer: MEDICARE

## 2023-07-25 ENCOUNTER — APPOINTMENT (OUTPATIENT)
Dept: PULMONOLOGY | Facility: CLINIC | Age: 73
End: 2023-07-25
Payer: MEDICARE

## 2023-07-25 VITALS — OXYGEN SATURATION: 98 % | HEART RATE: 65 BPM | RESPIRATION RATE: 14 BRPM

## 2023-07-25 PROCEDURE — 99213 OFFICE O/P EST LOW 20 MIN: CPT

## 2023-07-25 PROCEDURE — 99214 OFFICE O/P EST MOD 30 MIN: CPT

## 2023-07-25 NOTE — HISTORY OF PRESENT ILLNESS
[TextBox_4] : Overall she is doing good currently no cough wheezing or shortness of breath she needs a rescue inhaler very rarely maybe when the weather is too hot and humid.  Status post CT scan result reviewed today and discussed with the patient told her about the new nodule and we need to proceed with a PET scan.  PET scan was ordered prescription given to the patient told to do it like within 1 to 2 weeks and to call me after she do the PET scan currently as above patient denies any cough wheezing or shortness of breath no chest pain no fever.  Patient brought the PFT result that was done in May 2023 by her PMD which showed some obstructive and some restrictive pattern

## 2023-07-26 ENCOUNTER — NON-APPOINTMENT (OUTPATIENT)
Age: 73
End: 2023-07-26

## 2023-07-27 VITALS
BODY MASS INDEX: 18.14 KG/M2 | HEIGHT: 59 IN | HEART RATE: 60 BPM | DIASTOLIC BLOOD PRESSURE: 70 MMHG | OXYGEN SATURATION: 98 % | SYSTOLIC BLOOD PRESSURE: 118 MMHG | WEIGHT: 90 LBS | RESPIRATION RATE: 14 BRPM

## 2023-07-27 NOTE — ASSESSMENT
[FreeTextEntry1] : Ngoc Young is 72 year female ex-smoker, s/p RML lobectomy for adenocarcinoma (2/2020) and s/p EBONY lingular segmentectomy - for an enlarging EBONY GGO -pathology -T1a N0 - adenocarcinoma on 9/27/2021. Here today for review of surveillance CT. \par \par CT chest reviewed 1.1cm RLL nodule new from prior\par Pt is pending a PET with her Pulm\par F/U CTS after PET for discussion - tele visit \par F/U CTS in 6 months with CT chest \par \par Tegan WHITNEY FNP-BC, am acting as scribe for Dr. Paddy Odell \par
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
## 2023-07-27 NOTE — PHYSICAL EXAM
[Sclera] : the sclera and conjunctiva were normal [Neck Appearance] : the appearance of the neck was normal [] : no respiratory distress [Heart Rate And Rhythm] : heart rate was normal and rhythm regular [Bowel Sounds] : normal bowel sounds [Abdomen Soft] : soft [Involuntary Movements] : no involuntary movements were seen [Skin Color & Pigmentation] : normal skin color and pigmentation [No Focal Deficits] : no focal deficits [Oriented To Time, Place, And Person] : oriented to person, place, and time [Impaired Insight] : insight and judgment were intact

## 2023-08-04 ENCOUNTER — OUTPATIENT (OUTPATIENT)
Dept: OUTPATIENT SERVICES | Facility: HOSPITAL | Age: 73
LOS: 1 days | End: 2023-08-04
Payer: MEDICARE

## 2023-08-04 ENCOUNTER — RESULT REVIEW (OUTPATIENT)
Age: 73
End: 2023-08-04

## 2023-08-04 DIAGNOSIS — Z98.41 CATARACT EXTRACTION STATUS, RIGHT EYE: Chronic | ICD-10-CM

## 2023-08-04 DIAGNOSIS — Z90.10 ACQUIRED ABSENCE OF UNSPECIFIED BREAST AND NIPPLE: Chronic | ICD-10-CM

## 2023-08-04 DIAGNOSIS — Z98.890 OTHER SPECIFIED POSTPROCEDURAL STATES: Chronic | ICD-10-CM

## 2023-08-04 DIAGNOSIS — Z98.82 BREAST IMPLANT STATUS: Chronic | ICD-10-CM

## 2023-08-04 DIAGNOSIS — Z90.49 ACQUIRED ABSENCE OF OTHER SPECIFIED PARTS OF DIGESTIVE TRACT: Chronic | ICD-10-CM

## 2023-08-04 DIAGNOSIS — R91.1 SOLITARY PULMONARY NODULE: ICD-10-CM

## 2023-08-04 DIAGNOSIS — Z90.2 ACQUIRED ABSENCE OF LUNG [PART OF]: Chronic | ICD-10-CM

## 2023-08-04 LAB — GLUCOSE BLDC GLUCOMTR-MCNC: 70 MG/DL — SIGNIFICANT CHANGE UP (ref 70–99)

## 2023-08-04 PROCEDURE — 82962 GLUCOSE BLOOD TEST: CPT

## 2023-08-04 PROCEDURE — 78815 PET IMAGE W/CT SKULL-THIGH: CPT | Mod: MH,PI

## 2023-08-04 PROCEDURE — A9552: CPT

## 2023-08-04 PROCEDURE — 78815 PET IMAGE W/CT SKULL-THIGH: CPT | Mod: 26,PI,MH

## 2023-08-05 DIAGNOSIS — R91.1 SOLITARY PULMONARY NODULE: ICD-10-CM

## 2023-08-08 ENCOUNTER — APPOINTMENT (OUTPATIENT)
Dept: CARDIOTHORACIC SURGERY | Facility: CLINIC | Age: 73
End: 2023-08-08
Payer: MEDICARE

## 2023-08-08 PROCEDURE — 99443: CPT | Mod: 95

## 2023-08-09 ENCOUNTER — APPOINTMENT (OUTPATIENT)
Dept: PULMONOLOGY | Facility: CLINIC | Age: 73
End: 2023-08-09
Payer: MEDICARE

## 2023-08-09 ENCOUNTER — APPOINTMENT (OUTPATIENT)
Dept: CARDIOLOGY | Facility: CLINIC | Age: 73
End: 2023-08-09
Payer: MEDICARE

## 2023-08-09 VITALS
OXYGEN SATURATION: 99 % | WEIGHT: 91 LBS | DIASTOLIC BLOOD PRESSURE: 62 MMHG | HEIGHT: 59 IN | HEART RATE: 80 BPM | BODY MASS INDEX: 18.35 KG/M2 | SYSTOLIC BLOOD PRESSURE: 90 MMHG

## 2023-08-09 VITALS — DIASTOLIC BLOOD PRESSURE: 70 MMHG | SYSTOLIC BLOOD PRESSURE: 100 MMHG

## 2023-08-09 VITALS — DIASTOLIC BLOOD PRESSURE: 60 MMHG | SYSTOLIC BLOOD PRESSURE: 90 MMHG | HEART RATE: 67 BPM

## 2023-08-09 VITALS — BODY MASS INDEX: 18.35 KG/M2 | HEIGHT: 59 IN | WEIGHT: 91 LBS

## 2023-08-09 DIAGNOSIS — R94.2 ABNORMAL RESULTS OF PULMONARY FUNCTION STUDIES: ICD-10-CM

## 2023-08-09 DIAGNOSIS — E78.5 HYPERLIPIDEMIA, UNSPECIFIED: ICD-10-CM

## 2023-08-09 DIAGNOSIS — R91.1 SOLITARY PULMONARY NODULE: ICD-10-CM

## 2023-08-09 PROCEDURE — 99213 OFFICE O/P EST LOW 20 MIN: CPT

## 2023-08-09 PROCEDURE — 93000 ELECTROCARDIOGRAM COMPLETE: CPT

## 2023-08-09 PROCEDURE — 99214 OFFICE O/P EST MOD 30 MIN: CPT

## 2023-08-09 RX ORDER — ALPRAZOLAM 0.25 MG/1
0.25 TABLET ORAL
Qty: 30 | Refills: 0 | Status: DISCONTINUED | COMMUNITY
Start: 2022-11-16 | End: 2023-08-09

## 2023-08-09 NOTE — REVIEW OF SYSTEMS
[Negative] : Endocrine [Cough] : no cough [Dyspnea] : no dyspnea [SOB on Exertion] : no sob on exertion
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
## 2023-08-09 NOTE — ASSESSMENT
[FreeTextEntry1] : The patient has BEASLEY which I suspect is multifactorial. The patient has had no chest pain . Nuclear stress test was negative for ischemia and echo showed normal LV systolic function with mod MR with Mild MVP . The patient has no pulmonary HTN and IVC is nrmal with normal collapse  She states it does get better with Albuterol. No significant arrhythmia on MCOT  The patient is going for resection of RLL lung nodules. The patient has >4 METS exercise tolerance and had a negative nuclear stress esequiel within the past year . She has normal LV systolic function and MVP with moderate  MR . The patient should be considered an intermediate cardiac risk undergoing an intermediate risk procedure. .

## 2023-08-09 NOTE — HISTORY OF PRESENT ILLNESS
[FreeTextEntry1] : The patient has had some SOB . The patient had nuclear stress test which was negative for ischemia . Echo shows MVP with moderate MR . The patient is being followed by Dr. Kevin for his pacreatic cystic lesion    .

## 2023-08-09 NOTE — HISTORY OF PRESENT ILLNESS
[TextBox_4] : she feel good no sob cough or wheeze  not requiring rescue inhaler if anything very rarely  pet scan result discussed with her  she already seen by ct surgery and scheduled for removal as per patient wishes

## 2023-08-09 NOTE — CARDIOLOGY SUMMARY
[___] : [unfilled] [de-identified] : 01/03/2022: Sinus bradycardia, possible left atrial enlargement\par  9- NSR NS T wave change. \par  4- Sinus Bradycardai \par  10-3-2022 NSR NS T wave change. \par  4-5-2023 NSR NS  Twave change.  [de-identified] : 1-2023 MCOT no significant arrhythmias  [de-identified] : 9- ETT Echo completed Stage II No ischedmia  Moderate MR mild TR RVSP was noermal\par   Lexiscan stress test No ischemia .  [de-identified] : 1-3-2022 Trivial echo free space  12- Normal LV systolic fucntion  Trivial pericarddial effusion  mild MVP mild TR mod MR Normal RVSP /IVC and IVC collapse    [de-identified] : CT chest/abdomen/pelvis with IV contrast: 11 to 12 mm thick layer of pericardial fluid. New oval nodule LLL, groundglass area in RLL unchanged

## 2023-08-09 NOTE — PLAN
[TextEntry] : i discussed the pet scan result with the patient.  told her the option by proceeding with resection VS follow with ct scan closley  patient want it out she already scheduled for removal by dr Paddy Odell  she know about the pancreatic lesion and been followed by GI pending MRI

## 2023-08-10 NOTE — ASSESSMENT
[FreeTextEntry1] : Carlos Young is 72 year female ex-smoker here today for review of recent of PET/CT which showed an FDG Avid 1.1 CM RLL Nodule, history of  s/p RML lobectomy for pT1cN0 adenocarcinoma (2/2020) and s/p EBONY lingular segmentectomy - for an enlarging EBONY GGO -pathology -T1a N0 - adenocarcinoma on 9/27/2021.   Here today for review of PET/CT.  Plan: 5-10 minutes of televisit rendered Found to have abnormal FDG uptake to a 1.1 cm nodule in RLL Discussed with patient by phone as well as risks, benefits and alternatives to surgery Patient opted for surgery Will plan for Robotic Right VATS, RLL Wedge possible Segmentectomy MLND 8/24 HAD PFT MARCH 2023 Surgery- GALE and PAST  I, Anjana Pringle [facility], am acting as scribe for Dr.Villa Odell  I, Michele Odell reviewed the diagnostic images on patient:  CARLOS YOUNG on 08/08/2023 and agreed with my Nurse Practitioner's clinical note and treatment plan. Patient is known to me, she has a diagnosis of lung cancer, she is a status post right middle lobectomy in 2020 and left upper lobe lingulectomy in 2021.  Procedure were for a stage I adenocarcinoma.  Follow-up CT scan revealed no right lower lobe pulmonary nodule, PET/CT was ordered by pulmonary.  PET/CT images were reviewed by me on the phone with the patient and given her oncological history and the radiologic features and metabolic activity it is likely malignant.  Given the location of the lesion CT guided FNA very unlikely to be successful.  Given the peripheral location I recommended surgical resection.  Given multiple primary this will be the third 1 I good recommend to perform only a wedge resection.  I described the procedure, risk and possible complications.  Patient understood and agreed to proceed.  Plan schedule for robotic assisted right thoracoscopy lower lobe wedge resection.
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
## 2023-08-11 ENCOUNTER — OUTPATIENT (OUTPATIENT)
Dept: OUTPATIENT SERVICES | Facility: HOSPITAL | Age: 73
LOS: 1 days | End: 2023-08-11
Payer: MEDICARE

## 2023-08-11 ENCOUNTER — RESULT REVIEW (OUTPATIENT)
Age: 73
End: 2023-08-11

## 2023-08-11 VITALS
HEIGHT: 59 IN | WEIGHT: 91.93 LBS | DIASTOLIC BLOOD PRESSURE: 63 MMHG | RESPIRATION RATE: 18 BRPM | TEMPERATURE: 98 F | SYSTOLIC BLOOD PRESSURE: 131 MMHG | HEART RATE: 68 BPM | OXYGEN SATURATION: 100 %

## 2023-08-11 DIAGNOSIS — Z90.49 ACQUIRED ABSENCE OF OTHER SPECIFIED PARTS OF DIGESTIVE TRACT: Chronic | ICD-10-CM

## 2023-08-11 DIAGNOSIS — Z98.890 OTHER SPECIFIED POSTPROCEDURAL STATES: Chronic | ICD-10-CM

## 2023-08-11 DIAGNOSIS — R91.1 SOLITARY PULMONARY NODULE: ICD-10-CM

## 2023-08-11 DIAGNOSIS — Z90.10 ACQUIRED ABSENCE OF UNSPECIFIED BREAST AND NIPPLE: Chronic | ICD-10-CM

## 2023-08-11 DIAGNOSIS — Z01.818 ENCOUNTER FOR OTHER PREPROCEDURAL EXAMINATION: ICD-10-CM

## 2023-08-11 DIAGNOSIS — Z98.82 BREAST IMPLANT STATUS: Chronic | ICD-10-CM

## 2023-08-11 DIAGNOSIS — Z90.2 ACQUIRED ABSENCE OF LUNG [PART OF]: Chronic | ICD-10-CM

## 2023-08-11 DIAGNOSIS — Z98.41 CATARACT EXTRACTION STATUS, RIGHT EYE: Chronic | ICD-10-CM

## 2023-08-11 LAB
ALBUMIN SERPL ELPH-MCNC: 4.6 G/DL — SIGNIFICANT CHANGE UP (ref 3.5–5.2)
ALP SERPL-CCNC: 56 U/L — SIGNIFICANT CHANGE UP (ref 30–115)
ALT FLD-CCNC: 23 U/L — SIGNIFICANT CHANGE UP (ref 0–41)
ANION GAP SERPL CALC-SCNC: 11 MMOL/L — SIGNIFICANT CHANGE UP (ref 7–14)
APPEARANCE UR: CLEAR — SIGNIFICANT CHANGE UP
APTT BLD: 28.7 SEC — SIGNIFICANT CHANGE UP (ref 27–39.2)
AST SERPL-CCNC: 24 U/L — SIGNIFICANT CHANGE UP (ref 0–41)
BASOPHILS # BLD AUTO: 0.04 K/UL — SIGNIFICANT CHANGE UP (ref 0–0.2)
BASOPHILS NFR BLD AUTO: 0.8 % — SIGNIFICANT CHANGE UP (ref 0–1)
BILIRUB SERPL-MCNC: 0.6 MG/DL — SIGNIFICANT CHANGE UP (ref 0.2–1.2)
BILIRUB UR-MCNC: NEGATIVE — SIGNIFICANT CHANGE UP
BLD GP AB SCN SERPL QL: SIGNIFICANT CHANGE UP
BUN SERPL-MCNC: 12 MG/DL — SIGNIFICANT CHANGE UP (ref 10–20)
CALCIUM SERPL-MCNC: 9.6 MG/DL — SIGNIFICANT CHANGE UP (ref 8.4–10.5)
CHLORIDE SERPL-SCNC: 100 MMOL/L — SIGNIFICANT CHANGE UP (ref 98–110)
CO2 SERPL-SCNC: 27 MMOL/L — SIGNIFICANT CHANGE UP (ref 17–32)
COLOR SPEC: YELLOW — SIGNIFICANT CHANGE UP
CREAT SERPL-MCNC: 1 MG/DL — SIGNIFICANT CHANGE UP (ref 0.7–1.5)
DIFF PNL FLD: NEGATIVE — SIGNIFICANT CHANGE UP
EGFR: 60 ML/MIN/1.73M2 — SIGNIFICANT CHANGE UP
EOSINOPHIL # BLD AUTO: 0.1 K/UL — SIGNIFICANT CHANGE UP (ref 0–0.7)
EOSINOPHIL NFR BLD AUTO: 2.1 % — SIGNIFICANT CHANGE UP (ref 0–8)
GLUCOSE SERPL-MCNC: 62 MG/DL — LOW (ref 70–99)
GLUCOSE UR QL: NEGATIVE MG/DL — SIGNIFICANT CHANGE UP
HCT VFR BLD CALC: 39.9 % — SIGNIFICANT CHANGE UP (ref 37–47)
HGB BLD-MCNC: 13.5 G/DL — SIGNIFICANT CHANGE UP (ref 12–16)
IMM GRANULOCYTES NFR BLD AUTO: 0.2 % — SIGNIFICANT CHANGE UP (ref 0.1–0.3)
INR BLD: 0.94 RATIO — SIGNIFICANT CHANGE UP (ref 0.65–1.3)
KETONES UR-MCNC: NEGATIVE MG/DL — SIGNIFICANT CHANGE UP
LEUKOCYTE ESTERASE UR-ACNC: NEGATIVE — SIGNIFICANT CHANGE UP
LYMPHOCYTES # BLD AUTO: 1.44 K/UL — SIGNIFICANT CHANGE UP (ref 1.2–3.4)
LYMPHOCYTES # BLD AUTO: 30 % — SIGNIFICANT CHANGE UP (ref 20.5–51.1)
MCHC RBC-ENTMCNC: 33 PG — HIGH (ref 27–31)
MCHC RBC-ENTMCNC: 33.8 G/DL — SIGNIFICANT CHANGE UP (ref 32–37)
MCV RBC AUTO: 97.6 FL — SIGNIFICANT CHANGE UP (ref 81–99)
MONOCYTES # BLD AUTO: 0.41 K/UL — SIGNIFICANT CHANGE UP (ref 0.1–0.6)
MONOCYTES NFR BLD AUTO: 8.5 % — SIGNIFICANT CHANGE UP (ref 1.7–9.3)
NEUTROPHILS # BLD AUTO: 2.8 K/UL — SIGNIFICANT CHANGE UP (ref 1.4–6.5)
NEUTROPHILS NFR BLD AUTO: 58.4 % — SIGNIFICANT CHANGE UP (ref 42.2–75.2)
NITRITE UR-MCNC: NEGATIVE — SIGNIFICANT CHANGE UP
NRBC # BLD: 0 /100 WBCS — SIGNIFICANT CHANGE UP (ref 0–0)
PH UR: 7 — SIGNIFICANT CHANGE UP (ref 5–8)
PLATELET # BLD AUTO: 196 K/UL — SIGNIFICANT CHANGE UP (ref 130–400)
PMV BLD: 9.9 FL — SIGNIFICANT CHANGE UP (ref 7.4–10.4)
POTASSIUM SERPL-MCNC: 4.3 MMOL/L — SIGNIFICANT CHANGE UP (ref 3.5–5)
POTASSIUM SERPL-SCNC: 4.3 MMOL/L — SIGNIFICANT CHANGE UP (ref 3.5–5)
PROT SERPL-MCNC: 6.4 G/DL — SIGNIFICANT CHANGE UP (ref 6–8)
PROT UR-MCNC: NEGATIVE MG/DL — SIGNIFICANT CHANGE UP
PROTHROM AB SERPL-ACNC: 10.7 SEC — SIGNIFICANT CHANGE UP (ref 9.95–12.87)
RBC # BLD: 4.09 M/UL — LOW (ref 4.2–5.4)
RBC # FLD: 11.8 % — SIGNIFICANT CHANGE UP (ref 11.5–14.5)
SODIUM SERPL-SCNC: 138 MMOL/L — SIGNIFICANT CHANGE UP (ref 135–146)
SP GR SPEC: 1.01 — SIGNIFICANT CHANGE UP (ref 1–1.03)
UROBILINOGEN FLD QL: 0.2 MG/DL — SIGNIFICANT CHANGE UP (ref 0.2–1)
WBC # BLD: 4.8 K/UL — SIGNIFICANT CHANGE UP (ref 4.8–10.8)
WBC # FLD AUTO: 4.8 K/UL — SIGNIFICANT CHANGE UP (ref 4.8–10.8)

## 2023-08-11 PROCEDURE — 80053 COMPREHEN METABOLIC PANEL: CPT

## 2023-08-11 PROCEDURE — 85025 COMPLETE CBC W/AUTO DIFF WBC: CPT

## 2023-08-11 PROCEDURE — 85730 THROMBOPLASTIN TIME PARTIAL: CPT

## 2023-08-11 PROCEDURE — 85610 PROTHROMBIN TIME: CPT

## 2023-08-11 PROCEDURE — 86901 BLOOD TYPING SEROLOGIC RH(D): CPT

## 2023-08-11 PROCEDURE — 86850 RBC ANTIBODY SCREEN: CPT

## 2023-08-11 PROCEDURE — 81003 URINALYSIS AUTO W/O SCOPE: CPT

## 2023-08-11 PROCEDURE — 36415 COLL VENOUS BLD VENIPUNCTURE: CPT

## 2023-08-11 PROCEDURE — 99214 OFFICE O/P EST MOD 30 MIN: CPT | Mod: 25

## 2023-08-11 PROCEDURE — 86900 BLOOD TYPING SEROLOGIC ABO: CPT

## 2023-08-11 PROCEDURE — 71046 X-RAY EXAM CHEST 2 VIEWS: CPT | Mod: 26

## 2023-08-11 PROCEDURE — 71046 X-RAY EXAM CHEST 2 VIEWS: CPT

## 2023-08-11 RX ORDER — SOLIFENACIN SUCCINATE 10 MG/1
1 TABLET ORAL
Qty: 0 | Refills: 0 | DISCHARGE

## 2023-08-11 RX ORDER — EZETIMIBE 10 MG/1
1 TABLET ORAL
Qty: 0 | Refills: 0 | DISCHARGE

## 2023-08-11 NOTE — H&P PST ADULT - OTHER CARE PROVIDERS
piedad  lv 8/9 note on sunrise and connect north uploaded  "Pt has > 4 METS exercise tolerance and a neg nst test within the last year  nl lv function  and mvp and mod mr pt is considered an intermediate cardiac risk undergoing an intermediate risk procedure
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
## 2023-08-11 NOTE — H&P PST ADULT - NS HP PST LATEX ALLERGY
Wen is a 76 year old who is being evaluated via a billable telephone visit.      What phone number would you like to be contacted at? [phone] Sandra(cliff)  How would you like to obtain your AVS? Mail a copy    Assessment & Plan     Chronic, continuous use of opioids  Joint visit with wen and marilyn mares who is her caregiver due to h/o tbi after stroke/seizure disorder.     H/o stroke, stubborn, doesn't understand mask.  Lives with two brothers.  Cared for by marilyn mares. Hoping to get covid vaccine at home for kvng.  Sandra is getting her covid vaccine set up.     Needing T #3 refill for march 26.  No changes desired.  Feels working well.     Will mail contract to sandra to review/sign/return.     Sandra J Carlos  [id number] [place] [address]    10 minutes total.     Follow up 5-6 months for physical.         - acetaminophen-codeine (TYLENOL #3) 300-30 MG tablet  Dispense: 160 tablet; Refill: 5    Chronic pain syndrome    - acetaminophen-codeine (TYLENOL #3) 300-30 MG tablet  Dispense: 160 tablet; Refill: 5    Peripheral polyneuropathy    - acetaminophen-codeine (TYLENOL #3) 300-30 MG tablet  Dispense: 160 tablet; Refill: 5    Pain of right hip joint    - acetaminophen-codeine (TYLENOL #3) 300-30 MG tablet  Dispense: 160 tablet; Refill: 5    Seizure disorder (H)      Traumatic brain injury with loss of consciousness, sequela (H)                     Return in about 5 months (around 8/12/2021) for wellness/physical.    Joel Daniel Wegener, MD  [facility]    Subjective   Wen is a 76 year old who presents for the following health issues     HPI       6 month f/u  See plan for additional hpi         Review of Systems         Objective           Vitals:  No vitals were obtained today due to virtual visit.    Physical Exam   Slightly dysarthric speech.             Phone call duration: 10 minutes    Joel Wegener,MD    
No

## 2023-08-11 NOTE — H&P PST ADULT - REASON FOR ADMISSION
Case Type: OP Block TimeSuite: OR NorthProceduralist: Michele Odell  Confirmed Surgery DateTime: 08- - 0:00PAST DateTime: 08- - 16:00Procedure: BRONCHOSCOPY ROBOTIC RIGHT VIDEO ASSISTED THORACIC SURGERY RIGHT LOWER LOBE WEDGE RESECTION POSSIBLE SEGMENTECTOMY MEDIASTINAL LYMPH NODE BIOPSY  ERP?: YesLaterality: RightLength of Procedure: 180 Minutes  Anesthesia Type: General

## 2023-08-12 DIAGNOSIS — R91.1 SOLITARY PULMONARY NODULE: ICD-10-CM

## 2023-08-12 DIAGNOSIS — Z01.818 ENCOUNTER FOR OTHER PREPROCEDURAL EXAMINATION: ICD-10-CM

## 2023-08-24 ENCOUNTER — APPOINTMENT (OUTPATIENT)
Dept: CARDIOTHORACIC SURGERY | Facility: HOSPITAL | Age: 73
End: 2023-08-24

## 2023-08-24 ENCOUNTER — TRANSCRIPTION ENCOUNTER (OUTPATIENT)
Age: 73
End: 2023-08-24

## 2023-08-24 ENCOUNTER — INPATIENT (INPATIENT)
Facility: HOSPITAL | Age: 73
LOS: 0 days | Discharge: HOME | DRG: 163 | End: 2023-08-25
Attending: THORACIC SURGERY (CARDIOTHORACIC VASCULAR SURGERY) | Admitting: THORACIC SURGERY (CARDIOTHORACIC VASCULAR SURGERY)
Payer: MEDICARE

## 2023-08-24 ENCOUNTER — RESULT REVIEW (OUTPATIENT)
Age: 73
End: 2023-08-24

## 2023-08-24 VITALS
HEART RATE: 62 BPM | WEIGHT: 91.93 LBS | RESPIRATION RATE: 18 BRPM | DIASTOLIC BLOOD PRESSURE: 53 MMHG | OXYGEN SATURATION: 99 % | HEIGHT: 59 IN | TEMPERATURE: 98 F | SYSTOLIC BLOOD PRESSURE: 112 MMHG

## 2023-08-24 DIAGNOSIS — Z98.890 OTHER SPECIFIED POSTPROCEDURAL STATES: Chronic | ICD-10-CM

## 2023-08-24 DIAGNOSIS — Z90.49 ACQUIRED ABSENCE OF OTHER SPECIFIED PARTS OF DIGESTIVE TRACT: Chronic | ICD-10-CM

## 2023-08-24 DIAGNOSIS — Z90.2 ACQUIRED ABSENCE OF LUNG [PART OF]: Chronic | ICD-10-CM

## 2023-08-24 DIAGNOSIS — R91.1 SOLITARY PULMONARY NODULE: ICD-10-CM

## 2023-08-24 DIAGNOSIS — Z98.82 BREAST IMPLANT STATUS: Chronic | ICD-10-CM

## 2023-08-24 DIAGNOSIS — Z98.41 CATARACT EXTRACTION STATUS, RIGHT EYE: Chronic | ICD-10-CM

## 2023-08-24 DIAGNOSIS — Z90.10 ACQUIRED ABSENCE OF UNSPECIFIED BREAST AND NIPPLE: Chronic | ICD-10-CM

## 2023-08-24 LAB
ANION GAP SERPL CALC-SCNC: 10 MMOL/L — SIGNIFICANT CHANGE UP (ref 7–14)
BUN SERPL-MCNC: 11 MG/DL — SIGNIFICANT CHANGE UP (ref 10–20)
CALCIUM SERPL-MCNC: 8.3 MG/DL — LOW (ref 8.4–10.5)
CHLORIDE SERPL-SCNC: 104 MMOL/L — SIGNIFICANT CHANGE UP (ref 98–110)
CO2 SERPL-SCNC: 22 MMOL/L — SIGNIFICANT CHANGE UP (ref 17–32)
CREAT SERPL-MCNC: 0.9 MG/DL — SIGNIFICANT CHANGE UP (ref 0.7–1.5)
EGFR: 68 ML/MIN/1.73M2 — SIGNIFICANT CHANGE UP
GAS PNL BLDA: SIGNIFICANT CHANGE UP
GLUCOSE BLDC GLUCOMTR-MCNC: 97 MG/DL — SIGNIFICANT CHANGE UP (ref 70–99)
GLUCOSE SERPL-MCNC: 139 MG/DL — HIGH (ref 70–99)
HCT VFR BLD CALC: 37.1 % — SIGNIFICANT CHANGE UP (ref 37–47)
HGB BLD-MCNC: 12.5 G/DL — SIGNIFICANT CHANGE UP (ref 12–16)
MAGNESIUM SERPL-MCNC: 1.8 MG/DL — SIGNIFICANT CHANGE UP (ref 1.8–2.4)
MCHC RBC-ENTMCNC: 33.1 PG — HIGH (ref 27–31)
MCHC RBC-ENTMCNC: 33.7 G/DL — SIGNIFICANT CHANGE UP (ref 32–37)
MCV RBC AUTO: 98.1 FL — SIGNIFICANT CHANGE UP (ref 81–99)
NRBC # BLD: 0 /100 WBCS — SIGNIFICANT CHANGE UP (ref 0–0)
PLATELET # BLD AUTO: 169 K/UL — SIGNIFICANT CHANGE UP (ref 130–400)
PMV BLD: 9.7 FL — SIGNIFICANT CHANGE UP (ref 7.4–10.4)
POTASSIUM SERPL-MCNC: 3.6 MMOL/L — SIGNIFICANT CHANGE UP (ref 3.5–5)
POTASSIUM SERPL-SCNC: 3.6 MMOL/L — SIGNIFICANT CHANGE UP (ref 3.5–5)
RBC # BLD: 3.78 M/UL — LOW (ref 4.2–5.4)
RBC # FLD: 11.8 % — SIGNIFICANT CHANGE UP (ref 11.5–14.5)
SODIUM SERPL-SCNC: 136 MMOL/L — SIGNIFICANT CHANGE UP (ref 135–146)
WBC # BLD: 7.78 K/UL — SIGNIFICANT CHANGE UP (ref 4.8–10.8)
WBC # FLD AUTO: 7.78 K/UL — SIGNIFICANT CHANGE UP (ref 4.8–10.8)

## 2023-08-24 PROCEDURE — 83735 ASSAY OF MAGNESIUM: CPT

## 2023-08-24 PROCEDURE — 88307 TISSUE EXAM BY PATHOLOGIST: CPT

## 2023-08-24 PROCEDURE — 85025 COMPLETE CBC W/AUTO DIFF WBC: CPT

## 2023-08-24 PROCEDURE — 88307 TISSUE EXAM BY PATHOLOGIST: CPT | Mod: 26

## 2023-08-24 PROCEDURE — 85027 COMPLETE CBC AUTOMATED: CPT

## 2023-08-24 PROCEDURE — S2900: CPT

## 2023-08-24 PROCEDURE — 32674 THORACOSCOPY LYMPH NODE EXC: CPT

## 2023-08-24 PROCEDURE — 32674 THORACOSCOPY LYMPH NODE EXC: CPT | Mod: AS

## 2023-08-24 PROCEDURE — 32608 THORACOSCOPY W/BX NODULE: CPT | Mod: AS,RT

## 2023-08-24 PROCEDURE — 97161 PT EVAL LOW COMPLEX 20 MIN: CPT | Mod: GP

## 2023-08-24 PROCEDURE — C9399: CPT

## 2023-08-24 PROCEDURE — 82962 GLUCOSE BLOOD TEST: CPT

## 2023-08-24 PROCEDURE — 88312 SPECIAL STAINS GROUP 1: CPT

## 2023-08-24 PROCEDURE — 88312 SPECIAL STAINS GROUP 1: CPT | Mod: 26

## 2023-08-24 PROCEDURE — 32608 THORACOSCOPY W/BX NODULE: CPT | Mod: 22,RT

## 2023-08-24 PROCEDURE — C1889: CPT

## 2023-08-24 PROCEDURE — S2900 ROBOTIC SURGICAL SYSTEM: CPT | Mod: NC

## 2023-08-24 PROCEDURE — 36415 COLL VENOUS BLD VENIPUNCTURE: CPT

## 2023-08-24 PROCEDURE — 80048 BASIC METABOLIC PNL TOTAL CA: CPT

## 2023-08-24 PROCEDURE — 71045 X-RAY EXAM CHEST 1 VIEW: CPT

## 2023-08-24 PROCEDURE — 71045 X-RAY EXAM CHEST 1 VIEW: CPT | Mod: 26

## 2023-08-24 RX ORDER — SODIUM CHLORIDE 9 MG/ML
1000 INJECTION INTRAMUSCULAR; INTRAVENOUS; SUBCUTANEOUS
Refills: 0 | Status: DISCONTINUED | OUTPATIENT
Start: 2023-08-24 | End: 2023-08-25

## 2023-08-24 RX ORDER — PANTOPRAZOLE SODIUM 20 MG/1
40 TABLET, DELAYED RELEASE ORAL DAILY
Refills: 0 | Status: DISCONTINUED | OUTPATIENT
Start: 2023-08-24 | End: 2023-08-25

## 2023-08-24 RX ORDER — DOCUSATE SODIUM 100 MG
1 CAPSULE ORAL
Qty: 0 | Refills: 0 | DISCHARGE

## 2023-08-24 RX ORDER — EZETIMIBE 10 MG/1
1 TABLET ORAL
Qty: 0 | Refills: 0 | DISCHARGE

## 2023-08-24 RX ORDER — OMEGA-3 ACID ETHYL ESTERS 1 G
1200 CAPSULE ORAL
Qty: 0 | Refills: 0 | DISCHARGE

## 2023-08-24 RX ORDER — HYDROMORPHONE HYDROCHLORIDE 2 MG/ML
0.2 INJECTION INTRAMUSCULAR; INTRAVENOUS; SUBCUTANEOUS
Refills: 0 | Status: DISCONTINUED | OUTPATIENT
Start: 2023-08-24 | End: 2023-08-24

## 2023-08-24 RX ORDER — CHLORHEXIDINE GLUCONATE 213 G/1000ML
1 SOLUTION TOPICAL DAILY
Refills: 0 | Status: DISCONTINUED | OUTPATIENT
Start: 2023-08-24 | End: 2023-08-25

## 2023-08-24 RX ORDER — HYDROMORPHONE HYDROCHLORIDE 2 MG/ML
0.5 INJECTION INTRAMUSCULAR; INTRAVENOUS; SUBCUTANEOUS
Refills: 0 | Status: DISCONTINUED | OUTPATIENT
Start: 2023-08-24 | End: 2023-08-24

## 2023-08-24 RX ORDER — POLYETHYLENE GLYCOL 3350 17 G/17G
17 POWDER, FOR SOLUTION ORAL DAILY
Refills: 0 | Status: DISCONTINUED | OUTPATIENT
Start: 2023-08-25 | End: 2023-08-25

## 2023-08-24 RX ORDER — BENZOCAINE 10 %
1 GEL (GRAM) MUCOUS MEMBRANE EVERY 6 HOURS
Refills: 0 | Status: DISCONTINUED | OUTPATIENT
Start: 2023-08-24 | End: 2023-08-25

## 2023-08-24 RX ORDER — ACETAMINOPHEN 500 MG
650 TABLET ORAL ONCE
Refills: 0 | Status: COMPLETED | OUTPATIENT
Start: 2023-08-25 | End: 2023-08-25

## 2023-08-24 RX ORDER — ACETAMINOPHEN 500 MG
625 TABLET ORAL ONCE
Refills: 0 | Status: COMPLETED | OUTPATIENT
Start: 2023-08-24 | End: 2023-08-24

## 2023-08-24 RX ORDER — ACETAMINOPHEN 500 MG
1000 TABLET ORAL ONCE
Refills: 0 | Status: DISCONTINUED | OUTPATIENT
Start: 2023-08-24 | End: 2023-08-24

## 2023-08-24 RX ORDER — HEPARIN SODIUM 5000 [USP'U]/ML
5000 INJECTION INTRAVENOUS; SUBCUTANEOUS EVERY 8 HOURS
Refills: 0 | Status: DISCONTINUED | OUTPATIENT
Start: 2023-08-24 | End: 2023-08-25

## 2023-08-24 RX ORDER — SODIUM CHLORIDE 9 MG/ML
1000 INJECTION, SOLUTION INTRAVENOUS
Refills: 0 | Status: DISCONTINUED | OUTPATIENT
Start: 2023-08-24 | End: 2023-08-24

## 2023-08-24 RX ORDER — GABAPENTIN 400 MG/1
300 CAPSULE ORAL ONCE
Refills: 0 | Status: DISCONTINUED | OUTPATIENT
Start: 2023-08-24 | End: 2023-08-24

## 2023-08-24 RX ORDER — FLUTICASONE PROPIONATE 50 MCG
1 SPRAY, SUSPENSION NASAL
Qty: 0 | Refills: 0 | DISCHARGE

## 2023-08-24 RX ORDER — SOLIFENACIN SUCCINATE 10 MG/1
1 TABLET ORAL
Qty: 0 | Refills: 0 | DISCHARGE

## 2023-08-24 RX ORDER — HYDROMORPHONE HYDROCHLORIDE 2 MG/ML
30 INJECTION INTRAMUSCULAR; INTRAVENOUS; SUBCUTANEOUS
Refills: 0 | Status: DISCONTINUED | OUTPATIENT
Start: 2023-08-24 | End: 2023-08-25

## 2023-08-24 RX ORDER — HEPARIN SODIUM 5000 [USP'U]/ML
5000 INJECTION INTRAVENOUS; SUBCUTANEOUS ONCE
Refills: 0 | Status: DISCONTINUED | OUTPATIENT
Start: 2023-08-24 | End: 2023-08-24

## 2023-08-24 RX ORDER — ACETAMINOPHEN 500 MG
650 TABLET ORAL ONCE
Refills: 0 | Status: COMPLETED | OUTPATIENT
Start: 2023-08-24 | End: 2023-08-24

## 2023-08-24 RX ORDER — MAGNESIUM OXIDE 400 MG ORAL TABLET 241.3 MG
1 TABLET ORAL
Qty: 0 | Refills: 0 | DISCHARGE

## 2023-08-24 RX ORDER — ACETAMINOPHEN 500 MG
650 TABLET ORAL ONCE
Refills: 0 | Status: DISCONTINUED | OUTPATIENT
Start: 2023-08-25 | End: 2023-08-25

## 2023-08-24 RX ORDER — POTASSIUM CHLORIDE 20 MEQ
20 PACKET (EA) ORAL
Refills: 0 | Status: COMPLETED | OUTPATIENT
Start: 2023-08-24 | End: 2023-08-24

## 2023-08-24 RX ORDER — ONDANSETRON 8 MG/1
4 TABLET, FILM COATED ORAL EVERY 6 HOURS
Refills: 0 | Status: DISCONTINUED | OUTPATIENT
Start: 2023-08-24 | End: 2023-08-25

## 2023-08-24 RX ORDER — NALOXONE HYDROCHLORIDE 4 MG/.1ML
0.1 SPRAY NASAL
Refills: 0 | Status: DISCONTINUED | OUTPATIENT
Start: 2023-08-24 | End: 2023-08-25

## 2023-08-24 RX ORDER — CEFAZOLIN SODIUM 1 G
1000 VIAL (EA) INJECTION EVERY 8 HOURS
Refills: 0 | Status: COMPLETED | OUTPATIENT
Start: 2023-08-24 | End: 2023-08-25

## 2023-08-24 RX ORDER — SENNA PLUS 8.6 MG/1
2 TABLET ORAL AT BEDTIME
Refills: 0 | Status: DISCONTINUED | OUTPATIENT
Start: 2023-08-25 | End: 2023-08-25

## 2023-08-24 RX ORDER — FAMOTIDINE 10 MG/ML
0 INJECTION INTRAVENOUS
Qty: 0 | Refills: 0 | DISCHARGE

## 2023-08-24 RX ADMIN — HYDROMORPHONE HYDROCHLORIDE 30 MILLILITER(S): 2 INJECTION INTRAMUSCULAR; INTRAVENOUS; SUBCUTANEOUS at 12:43

## 2023-08-24 RX ADMIN — HEPARIN SODIUM 5000 UNIT(S): 5000 INJECTION INTRAVENOUS; SUBCUTANEOUS at 14:22

## 2023-08-24 RX ADMIN — Medication 650 MILLIGRAM(S): at 20:32

## 2023-08-24 RX ADMIN — Medication 20 MILLIEQUIVALENT(S): at 20:32

## 2023-08-24 RX ADMIN — Medication 260 MILLIGRAM(S): at 20:17

## 2023-08-24 RX ADMIN — ONDANSETRON 4 MILLIGRAM(S): 8 TABLET, FILM COATED ORAL at 18:25

## 2023-08-24 RX ADMIN — Medication 250 MILLIGRAM(S): at 15:32

## 2023-08-24 RX ADMIN — SODIUM CHLORIDE 10 MILLILITER(S): 9 INJECTION INTRAMUSCULAR; INTRAVENOUS; SUBCUTANEOUS at 17:35

## 2023-08-24 RX ADMIN — PANTOPRAZOLE SODIUM 40 MILLIGRAM(S): 20 TABLET, DELAYED RELEASE ORAL at 14:22

## 2023-08-24 RX ADMIN — HEPARIN SODIUM 5000 UNIT(S): 5000 INJECTION INTRAVENOUS; SUBCUTANEOUS at 21:48

## 2023-08-24 RX ADMIN — Medication 20 MILLIEQUIVALENT(S): at 18:58

## 2023-08-24 RX ADMIN — SODIUM CHLORIDE 10 MILLILITER(S): 9 INJECTION INTRAMUSCULAR; INTRAVENOUS; SUBCUTANEOUS at 14:21

## 2023-08-24 RX ADMIN — Medication 100 MILLIGRAM(S): at 23:31

## 2023-08-24 RX ADMIN — Medication 100 MILLIGRAM(S): at 17:32

## 2023-08-24 NOTE — BRIEF OPERATIVE NOTE - COMMENTS
Juvenal Scherer PA-C first assisted in all major parts of the operation in the absence of a qualified surgeon, fellow, or resident physician.

## 2023-08-24 NOTE — BRIEF OPERATIVE NOTE - OPERATION/FINDINGS
4 incisions made, R lower lobe wedge resection, R lymph node dissections levels 7-10, 28 Fr. chest tube placed to suction with small air leak. Path showed granuloma, no cancer.

## 2023-08-24 NOTE — DISCHARGE NOTE PROVIDER - NSDCFUSCHEDAPPT_GEN_ALL_CORE_FT
Unknown, Doctor  [facility] PreAdmits  Scheduled Appointment: 10/16/2023    [facility]  BRSTIMAG [address]  Scheduled Appointment: 10/16/2023    [facility]  ULTRASND SI [address]  Scheduled Appointment: 10/16/2023    Agustin Stephens  [facility]  ONCORTHO [address]  Scheduled Appointment: 10/24/2023

## 2023-08-24 NOTE — BRIEF OPERATIVE NOTE - NSICDXBRIEFPROCEDURE_GEN_ALL_CORE_FT
PROCEDURES:  Bronchoscopy 24-Aug-2023 11:19:48  Nereyda Lawton  VATS, with lung resection 24-Aug-2023 11:20:03 R sided VATS with R lower lobe wedge resection, lymph node dissection levels 7-10 Nereyda Lawton

## 2023-08-24 NOTE — DISCHARGE NOTE PROVIDER - PROVIDER TOKENS
PROVIDER:[TOKEN:[[id number]]] PROVIDER:[TOKEN:[[id number]],SCHEDULEDAPPT:[09/05/2023],SCHEDULEDAPPTTIME:[11:00 AM]]

## 2023-08-24 NOTE — DISCHARGE NOTE PROVIDER - CARE PROVIDER_API CALL
Michele Maxwell  Thoracic and Cardiac Surgery  [address] [place] [address], [address] [address]  Phone: [phone]  Fax: [phone]  Follow Up Time:    Michele Maxwell  Thoracic and Cardiac Surgery  [address] [place] [address], [address] [address]  Phone: [phone]  Fax: [phone]  Scheduled Appointment: 09/05/2023 11:00 AM

## 2023-08-24 NOTE — DISCHARGE NOTE PROVIDER - NSDCFUADDINST_GEN_ALL_CORE_FT
Activities/Restrictions  1. Do not drive while taking pain medication, Patient may resume driving when able to maneuver vehicle without pain and/or hesitation.  2. Please refrain from any heavy lifting, pulling or pushing over 15 pounds for 2 weeks.  3. Shower daily and carefully wash wounds, pat dry. No baths or swimming for 4 weeks.   4. Do progressive walking exercises every day, gradually increasing to 30 to 40 minutes/day, five days a week.  5. Continue incentive spirometer for 4-6 weeks until your are chest pain free and work of breathing feels the same as pre-operatively.  6. Please remove all surgical dressings 48 hours after surgery.    7. DO NOT DRIVE OR CONSUME ALCOHOL WHILE TAKING PAIN MEDICATION.  Contact your Physician promptly if:  1.  Signs of wound infection, such as increasing redness, swelling, pain or drainage from incisions.  2. Progressive shortness of breath or increasing difficulty with breathing when lying down.  3. Excessive nausea, vomiting, diarrhea or coughing.  4. Increase swelling of legs that does not resolve with leg elevation.  5. Chest pain that spreads to arms, jaw or back or sudden development of numbness, weakness, difficulty speaking or facial droop – Call 911.  6. Diabetics who are unable to keep finger stick glucose under 150 for three consecutive readings.  Instructions:  1. Call office for Temp > 101, pulse greater than 110 or less than 55, BP first # greater than 160 or less than 100, 3 pound weight gain in 1 day or 5 pounds in 3 days  2. Please maintain chest tube incision dressing intact for 2 days.

## 2023-08-24 NOTE — CHART NOTE - NSCHARTNOTEFT_GEN_A_CORE
Post Operative Note  Patient: CARLOS OLEA 72y (1950) Female   MRN: [id number]  Location: [address] [place] [address]  Visit: 08-24-23 Inpatient  Date: 08-24-23 @ 23:20    PROCEDURE: Bronchoscopy, Right VATS w/ RLL wedge resection + LND levels 7-10       SUBJECTIVE:   Nausea: no, Vomiting: no, Ambulating: no  Pain Assessment: Patient is complaining of chest wall pain that is appropriate for post-operative course. PCA pump for pain control.   Right chest tube to suction w/ output of 120cc serosanguinous. Tolerating CLD. Post-operative CXR is stable. Pulling 1000 on incentive spirometer.     Objective:  Vitals: T(F): 96.2 (08-24-23 @ 20:00), Max: 98.2 (08-24-23 @ 11:10)  HR: 66 (08-24-23 @ 22:00)  BP: 92/51 (08-24-23 @ 22:00) (92/51 - 144/78)  RR: 30 (08-24-23 @ 20:00)  SpO2: 100% (08-24-23 @ 22:00)  Vent Settings:     In:   08-24-23 @ 07:01  -  08-24-23 @ 23:20  --------------------------------------------------------  IN: 185 mL      IV Fluids: sodium chloride 0.9%. 1000 milliLiter(s) (10 mL/Hr) IV Continuous <Continuous>      Out:   08-24-23 @ 07:01  -  08-24-23 @ 23:20  --------------------------------------------------------  OUT: 650 mL      EBL:  10cc    Voided Urine:   08-24-23 @ 07:01  -  08-24-23 @ 23:20  --------------------------------------------------------  OUT: 650 mL      Palomares Catheter: yes   Drains: no     Chest Tube:   08-24-23 @ 07:01  -  08-24-23 @ 23:20  --------------------------------------------------------  OUT: 155 mL       NG Tube: no      Physical Examination:  General Appearance: NAD, resting comfortably   HEENT: EOMI, sclera non-icteric.  Heart: RRR  Lungs: CTABL; +Right chest pigtail to section (no air leak)   Abdomen:  Soft, nontender, nondistended. No rigidity, guarding, or rebound tenderness.   MSK/Extremities: Warm & well-perfused. Peripheral pulses intact.  Skin: Warm, dry. No jaundice.     Medications: [Standing]  benzocaine 20% Spray 1 Spray(s) Topical every 6 hours PRN  ceFAZolin   IVPB 1000 milliGRAM(s) IV Intermittent every 8 hours  chlorhexidine 2% Cloths 1 Application(s) Topical daily  heparin   Injectable 5000 Unit(s) SubCutaneous every 8 hours  HYDROmorphone PCA (1 mG/mL) 30 milliLiter(s) PCA Continuous PCA Continuous  naloxone Injectable 0.1 milliGRAM(s) IV Push every 3 minutes PRN  ondansetron Injectable 4 milliGRAM(s) IV Push every 6 hours PRN  pantoprazole    Tablet 40 milliGRAM(s) Oral daily  sodium chloride 0.9%. 1000 milliLiter(s) IV Continuous <Continuous>    Medications: [PRN]  benzocaine 20% Spray 1 Spray(s) Topical every 6 hours PRN  ceFAZolin   IVPB 1000 milliGRAM(s) IV Intermittent every 8 hours  chlorhexidine 2% Cloths 1 Application(s) Topical daily  heparin   Injectable 5000 Unit(s) SubCutaneous every 8 hours  HYDROmorphone PCA (1 mG/mL) 30 milliLiter(s) PCA Continuous PCA Continuous  naloxone Injectable 0.1 milliGRAM(s) IV Push every 3 minutes PRN  ondansetron Injectable 4 milliGRAM(s) IV Push every 6 hours PRN  pantoprazole    Tablet 40 milliGRAM(s) Oral daily  sodium chloride 0.9%. 1000 milliLiter(s) IV Continuous <Continuous>      DVT PROPHYLAXIS: heparin   Injectable 5000 Unit(s) SubCutaneous every 8 hours    GI PROPHYLAXIS: pantoprazole    Tablet 40 milliGRAM(s) Oral daily    ANTIBIOTICS:  ceFAZolin   IVPB 1000 milliGRAM(s)        Labs:                        12.5   7.78  )-----------( 169      ( 24 Aug 2023 12:30 )             37.1     08-24    136  |  104  |  11  ----------------------------<  139<H>  3.6   |  22  |  0.9    Ca    8.3<L>      24 Aug 2023 12:30  Mg     1.8     08-24        Imaging:  Xray Chest 1 View-PORTABLE IMMEDIATE (08.24.23 @ 12:09) >    No radiographic evidence of acute cardiopulmonary disease.    < end of copied text >        Assessment:  72yF s/p bronchoscopy and right VATS w/ RLL wedge resection, LND levels 7-10. Pathology confirmed granuloma.     Plan:  - Advance to regular diet   - Monitor vitals  - Monitor post-op labs and replete as necessary  - Monitor for bowel function  - PCA pump for pain  - Ancef Q8H for x 3 doses total   - Encourage ambulation as tolerated  - Monitor urine output and trial of void once Palomares removed  - DVT and GI Prophylaxis      Date/Time: 08-24-23 @ 23:20

## 2023-08-24 NOTE — DISCHARGE NOTE PROVIDER - NSDCMRMEDTOKEN_GEN_ALL_CORE_FT
Dulcolax Stool Softener 100 mg oral capsule: 1 cap(s) orally 2 times a day  FAMOTIDINE 40 MG TABLET: TAKE 1 TABLET BY MOUTH EVERYDAY AT BEDTIME  Fish Oil: 1200  mucous membrane 2 times a day  Flax Seed Oil oral capsule:   Flonase 50 mcg/inh nasal spray: 1 spray(s) nasal once a day  magnesium oxide 500 mg oral tablet: 1 tab(s) orally once a day  Multiple Vitamins oral tablet: 1 tab(s) orally once a day  pravastatin 40 mg oral tablet: 1 tab(s) orally once a day (at bedtime)  VESIcare 5 mg oral tablet: 1 tab(s) orally once a day  Zetia 10 mg oral tablet: 1 tab(s) orally once a day (at bedtime)
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
## 2023-08-24 NOTE — PATIENT PROFILE ADULT - FALL HARM RISK - HARM RISK INTERVENTIONS
Assistance with ambulation/Assistance OOB with selected safe patient handling equipment/Communicate Risk of Fall with Harm to all staff/Monitor gait and stability/Reinforce activity limits and safety measures with patient and family/Review medications for side effects contributing to fall risk/Sit up slowly, dangle for a short time, stand at bedside before walking/Tailored Fall Risk Interventions/Toileting schedule using arm’s reach rule for commode and bathroom/Use of alarms - bed, chair and/or voice tab/Visual Cue: Yellow wristband and red socks/Bed in lowest position, wheels locked, appropriate side rails in place/Call bell, personal items and telephone in reach/Instruct patient to call for assistance before getting out of bed or chair/Non-slip footwear when patient is out of bed/[place] to call system/Physically safe environment - no spills, clutter or unnecessary equipment/Purposeful Proactive Rounding/Room/bathroom lighting operational, light cord in reach

## 2023-08-25 ENCOUNTER — TRANSCRIPTION ENCOUNTER (OUTPATIENT)
Age: 73
End: 2023-08-25

## 2023-08-25 ENCOUNTER — EMERGENCY (EMERGENCY)
Facility: HOSPITAL | Age: 73
LOS: 0 days | Discharge: HOME | End: 2023-08-26
Attending: EMERGENCY MEDICINE
Payer: MEDICARE

## 2023-08-25 VITALS — RESPIRATION RATE: 20 BRPM | HEART RATE: 76 BPM

## 2023-08-25 DIAGNOSIS — Z85.118 PERSONAL HISTORY OF OTHER MALIGNANT NEOPLASM OF BRONCHUS AND LUNG: ICD-10-CM

## 2023-08-25 DIAGNOSIS — Z98.82 BREAST IMPLANT STATUS: Chronic | ICD-10-CM

## 2023-08-25 DIAGNOSIS — Z90.2 ACQUIRED ABSENCE OF LUNG [PART OF]: Chronic | ICD-10-CM

## 2023-08-25 DIAGNOSIS — Z88.0 ALLERGY STATUS TO PENICILLIN: ICD-10-CM

## 2023-08-25 DIAGNOSIS — Z90.49 ACQUIRED ABSENCE OF OTHER SPECIFIED PARTS OF DIGESTIVE TRACT: Chronic | ICD-10-CM

## 2023-08-25 DIAGNOSIS — Z90.49 ACQUIRED ABSENCE OF OTHER SPECIFIED PARTS OF DIGESTIVE TRACT: ICD-10-CM

## 2023-08-25 DIAGNOSIS — Z98.890 OTHER SPECIFIED POSTPROCEDURAL STATES: Chronic | ICD-10-CM

## 2023-08-25 DIAGNOSIS — Z90.10 ACQUIRED ABSENCE OF UNSPECIFIED BREAST AND NIPPLE: Chronic | ICD-10-CM

## 2023-08-25 DIAGNOSIS — Z87.19 PERSONAL HISTORY OF OTHER DISEASES OF THE DIGESTIVE SYSTEM: ICD-10-CM

## 2023-08-25 DIAGNOSIS — Z98.41 CATARACT EXTRACTION STATUS, RIGHT EYE: Chronic | ICD-10-CM

## 2023-08-25 DIAGNOSIS — Z98.82 BREAST IMPLANT STATUS: ICD-10-CM

## 2023-08-25 DIAGNOSIS — E78.00 PURE HYPERCHOLESTEROLEMIA, UNSPECIFIED: ICD-10-CM

## 2023-08-25 DIAGNOSIS — Z90.2 ACQUIRED ABSENCE OF LUNG [PART OF]: ICD-10-CM

## 2023-08-25 DIAGNOSIS — L76.22 POSTPROCEDURAL HEMORRHAGE OF SKIN AND SUBCUTANEOUS TISSUE FOLLOWING OTHER PROCEDURE: ICD-10-CM

## 2023-08-25 DIAGNOSIS — Z90.11 ACQUIRED ABSENCE OF RIGHT BREAST AND NIPPLE: ICD-10-CM

## 2023-08-25 DIAGNOSIS — Z85.3 PERSONAL HISTORY OF MALIGNANT NEOPLASM OF BREAST: ICD-10-CM

## 2023-08-25 DIAGNOSIS — Z98.890 OTHER SPECIFIED POSTPROCEDURAL STATES: ICD-10-CM

## 2023-08-25 LAB
ANION GAP SERPL CALC-SCNC: 10 MMOL/L — SIGNIFICANT CHANGE UP (ref 7–14)
BASOPHILS # BLD AUTO: 0.07 K/UL — SIGNIFICANT CHANGE UP (ref 0–0.2)
BASOPHILS NFR BLD AUTO: 0.9 % — SIGNIFICANT CHANGE UP (ref 0–1)
BUN SERPL-MCNC: 9 MG/DL — LOW (ref 10–20)
CALCIUM SERPL-MCNC: 8.4 MG/DL — SIGNIFICANT CHANGE UP (ref 8.4–10.5)
CHLORIDE SERPL-SCNC: 105 MMOL/L — SIGNIFICANT CHANGE UP (ref 98–110)
CO2 SERPL-SCNC: 23 MMOL/L — SIGNIFICANT CHANGE UP (ref 17–32)
CREAT SERPL-MCNC: 0.8 MG/DL — SIGNIFICANT CHANGE UP (ref 0.7–1.5)
EGFR: 79 ML/MIN/1.73M2 — SIGNIFICANT CHANGE UP
EOSINOPHIL # BLD AUTO: 0.05 K/UL — SIGNIFICANT CHANGE UP (ref 0–0.7)
EOSINOPHIL NFR BLD AUTO: 0.7 % — SIGNIFICANT CHANGE UP (ref 0–8)
GLUCOSE SERPL-MCNC: 87 MG/DL — SIGNIFICANT CHANGE UP (ref 70–99)
HCT VFR BLD CALC: 33 % — LOW (ref 37–47)
HGB BLD-MCNC: 11 G/DL — LOW (ref 12–16)
IMM GRANULOCYTES NFR BLD AUTO: 0.3 % — SIGNIFICANT CHANGE UP (ref 0.1–0.3)
LYMPHOCYTES # BLD AUTO: 1.23 K/UL — SIGNIFICANT CHANGE UP (ref 1.2–3.4)
LYMPHOCYTES # BLD AUTO: 16.3 % — LOW (ref 20.5–51.1)
MAGNESIUM SERPL-MCNC: 1.9 MG/DL — SIGNIFICANT CHANGE UP (ref 1.8–2.4)
MCHC RBC-ENTMCNC: 32.9 PG — HIGH (ref 27–31)
MCHC RBC-ENTMCNC: 33.3 G/DL — SIGNIFICANT CHANGE UP (ref 32–37)
MCV RBC AUTO: 98.8 FL — SIGNIFICANT CHANGE UP (ref 81–99)
MONOCYTES # BLD AUTO: 0.48 K/UL — SIGNIFICANT CHANGE UP (ref 0.1–0.6)
MONOCYTES NFR BLD AUTO: 6.4 % — SIGNIFICANT CHANGE UP (ref 1.7–9.3)
NEUTROPHILS # BLD AUTO: 5.69 K/UL — SIGNIFICANT CHANGE UP (ref 1.4–6.5)
NEUTROPHILS NFR BLD AUTO: 75.4 % — HIGH (ref 42.2–75.2)
NRBC # BLD: 0 /100 WBCS — SIGNIFICANT CHANGE UP (ref 0–0)
PLATELET # BLD AUTO: 182 K/UL — SIGNIFICANT CHANGE UP (ref 130–400)
PMV BLD: 9.8 FL — SIGNIFICANT CHANGE UP (ref 7.4–10.4)
POTASSIUM SERPL-MCNC: 4.8 MMOL/L — SIGNIFICANT CHANGE UP (ref 3.5–5)
POTASSIUM SERPL-SCNC: 4.8 MMOL/L — SIGNIFICANT CHANGE UP (ref 3.5–5)
RBC # BLD: 3.34 M/UL — LOW (ref 4.2–5.4)
RBC # FLD: 11.8 % — SIGNIFICANT CHANGE UP (ref 11.5–14.5)
SODIUM SERPL-SCNC: 138 MMOL/L — SIGNIFICANT CHANGE UP (ref 135–146)
WBC # BLD: 7.54 K/UL — SIGNIFICANT CHANGE UP (ref 4.8–10.8)
WBC # FLD AUTO: 7.54 K/UL — SIGNIFICANT CHANGE UP (ref 4.8–10.8)

## 2023-08-25 PROCEDURE — 86901 BLOOD TYPING SEROLOGIC RH(D): CPT

## 2023-08-25 PROCEDURE — 12011 RPR F/E/E/N/L/M 2.5 CM/<: CPT

## 2023-08-25 PROCEDURE — 12031 INTMD RPR S/A/T/EXT 2.5 CM/<: CPT

## 2023-08-25 PROCEDURE — 71045 X-RAY EXAM CHEST 1 VIEW: CPT | Mod: 26,77

## 2023-08-25 PROCEDURE — 86900 BLOOD TYPING SEROLOGIC ABO: CPT

## 2023-08-25 PROCEDURE — 71045 X-RAY EXAM CHEST 1 VIEW: CPT | Mod: 26

## 2023-08-25 PROCEDURE — 71045 X-RAY EXAM CHEST 1 VIEW: CPT

## 2023-08-25 PROCEDURE — 85730 THROMBOPLASTIN TIME PARTIAL: CPT

## 2023-08-25 PROCEDURE — 86850 RBC ANTIBODY SCREEN: CPT

## 2023-08-25 PROCEDURE — 36415 COLL VENOUS BLD VENIPUNCTURE: CPT

## 2023-08-25 PROCEDURE — 85610 PROTHROMBIN TIME: CPT

## 2023-08-25 PROCEDURE — 99285 EMERGENCY DEPT VISIT HI MDM: CPT

## 2023-08-25 PROCEDURE — 99284 EMERGENCY DEPT VISIT MOD MDM: CPT | Mod: 25

## 2023-08-25 PROCEDURE — 80053 COMPREHEN METABOLIC PANEL: CPT

## 2023-08-25 PROCEDURE — 85025 COMPLETE CBC W/AUTO DIFF WBC: CPT

## 2023-08-25 RX ORDER — OXYCODONE AND ACETAMINOPHEN 5; 325 MG/1; MG/1
1 TABLET ORAL
Qty: 56 | Refills: 0
Start: 2023-08-25 | End: 2023-09-07

## 2023-08-25 RX ADMIN — PANTOPRAZOLE SODIUM 40 MILLIGRAM(S): 20 TABLET, DELAYED RELEASE ORAL at 12:34

## 2023-08-25 RX ADMIN — Medication 260 MILLIGRAM(S): at 02:52

## 2023-08-25 RX ADMIN — CHLORHEXIDINE GLUCONATE 1 APPLICATION(S): 213 SOLUTION TOPICAL at 12:32

## 2023-08-25 RX ADMIN — Medication 260 MILLIGRAM(S): at 11:16

## 2023-08-25 RX ADMIN — Medication 100 MILLIGRAM(S): at 09:04

## 2023-08-25 RX ADMIN — HEPARIN SODIUM 5000 UNIT(S): 5000 INJECTION INTRAVENOUS; SUBCUTANEOUS at 05:04

## 2023-08-25 NOTE — PHYSICAL THERAPY INITIAL EVALUATION ADULT - THERAPY FREQUENCY, PT EVAL
Pt d/c from b/s PT, presents independent with functional mobility without AD. Pt reports no concerns.

## 2023-08-25 NOTE — PHYSICAL THERAPY INITIAL EVALUATION ADULT - ADDITIONAL COMMENTS
Pt lives with family in pvt home with flight of stairs indoors, reports independent with functional mobility without AD prior to admission.

## 2023-08-25 NOTE — PROGRESS NOTE ADULT - ASSESSMENT
Assessment:  72yF s/p bronchoscopy and right VATS w/ RLL wedge resection, LND levels 7-10. Pathology confirmed granuloma. Recovering well postoperatively. Lilibeth CLD.     Plan:  - Advance to regular diet   - Monitor vitals  - Monitor daily labs and replete as necessary  - Monitor for bowel function  - PCA pump for pain  - Ancef Q8H for x 3 doses total   - Encourage ambulation as tolerated  - Monitor urine output and trial of void once Palomares removed  - DVT and GI Prophylaxis  - Continue IS  - F/U AM labs  - F/U CT output

## 2023-08-25 NOTE — PHYSICAL THERAPY INITIAL EVALUATION ADULT - SPECIFY REASON(S)
Pt presents independent with functional mobility without AD, anticipating d/c home today. Pt reports no concerns.

## 2023-08-25 NOTE — DISCHARGE NOTE NURSING/CASE MANAGEMENT/SOCIAL WORK - NSDCPEFALRISK_GEN_ALL_CORE
For information on Fall & Injury Prevention, visit: [email or web address] OR  [email or web address] OR  https://www.cdc.gov/steadi/patient.html

## 2023-08-25 NOTE — DISCHARGE NOTE NURSING/CASE MANAGEMENT/SOCIAL WORK - PATIENT PORTAL LINK FT
You can access the FollowMyHealth Patient Portal offered by [facility] by registering at the following website: [email or web address]. By joining [facility]’s FollowMyHealth portal, you will also be able to view your health information using other applications (apps) compatible with our system.

## 2023-08-25 NOTE — PROGRESS NOTE ADULT - SUBJECTIVE AND OBJECTIVE BOX
THORACIC SURGERY PROGRESS NOTE     CARLOS OLEA  72y  Female  Hospital day :1d  POD:1  Procedure: Bronchoscopy    VATS, with lung resection      OVERNIGHT EVENTS: NAEO, CT in place, richie CLD, CT output serosanguineous, pulling 1250 on IS.     T(F): 96.2 (08-24-23 @ 20:00), Max: 98.2 (08-24-23 @ 11:10)  HR: 64 (08-24-23 @ 23:30) (53 - 80)  BP: 99/48 (08-24-23 @ 23:30) (92/51 - 144/78)  ABP: 92/33 (08-24-23 @ 22:00) (92/33 - 149/58)  ABP(mean): 54 (08-24-23 @ 22:00) (54 - 99)  RR: 22 (08-24-23 @ 23:00) (13 - 30)  SpO2: 100% (08-24-23 @ 23:30) (94% - 100%)    DIET/FLUIDS: sodium chloride 0.9%. 1000 milliLiter(s) IV Continuous <Continuous>      URINE:    Indwelling Urethral Catheter:     Connect To:  Straight Drainage/Gravity    Indication:  Urine Output Monitoring in Critically Ill    Additional Instructions:  For 48 hours, To gravity bag urimeter OR digital urimeter (08-24-23 @ 11:30)    GI proph:  pantoprazole    Tablet 40 milliGRAM(s) Oral daily    AC/ proph: heparin   Injectable 5000 Unit(s) SubCutaneous every 8 hours    ABx: ceFAZolin   IVPB 1000 milliGRAM(s) IV Intermittent every 8 hours      PHYSICAL EXAM:  GENERAL: NAD, well-appearing  CHEST/LUNG: Equal chest rise bilaterally, R CT in place  HEART: Regular rate   ABDOMEN: Soft, Nontender, Nondistended;   EXTREMITIES:  No clubbing, cyanosis, or edema      LABS  Labs:  CAPILLARY BLOOD GLUCOSE      POCT Blood Glucose.: 97 mg/dL (24 Aug 2023 16:02)                          12.5   7.78  )-----------( 169      ( 24 Aug 2023 12:30 )             37.1         08-24    136  |  104  |  11  ----------------------------<  139<H>  3.6   |  22  |  0.9      Calcium: 8.3 mg/dL (08-24-23 @ 12:30)      Urinalysis Basic - ( 24 Aug 2023 12:30 )    Color: x / Appearance: x / SG: x / pH: x  Gluc: 139 mg/dL / Ketone: x  / Bili: x / Urobili: x   Blood: x / Protein: x / Nitrite: x   Leuk Esterase: x / RBC: x / WBC x   Sq Epi: x / Non Sq Epi: x / Bacteria: x            RADIOLOGY & ADDITIONAL TESTS:  < from: Xray Chest 1 View-PORTABLE IMMEDIATE (Xray Chest 1 View-PORTABLE IMMEDIATE .) (08.24.23 @ 12:09) >  No radiographic evidence of acute cardiopulmonary disease.    < end of copied text >

## 2023-08-26 VITALS
RESPIRATION RATE: 18 BRPM | HEART RATE: 82 BPM | OXYGEN SATURATION: 98 % | DIASTOLIC BLOOD PRESSURE: 55 MMHG | SYSTOLIC BLOOD PRESSURE: 110 MMHG

## 2023-08-26 VITALS
SYSTOLIC BLOOD PRESSURE: 102 MMHG | HEIGHT: 59 IN | TEMPERATURE: 98 F | RESPIRATION RATE: 20 BRPM | OXYGEN SATURATION: 98 % | DIASTOLIC BLOOD PRESSURE: 56 MMHG | HEART RATE: 101 BPM | WEIGHT: 91.05 LBS

## 2023-08-26 LAB
ALBUMIN SERPL ELPH-MCNC: 3.8 G/DL — SIGNIFICANT CHANGE UP (ref 3.5–5.2)
ALP SERPL-CCNC: 47 U/L — SIGNIFICANT CHANGE UP (ref 30–115)
ALT FLD-CCNC: 12 U/L — SIGNIFICANT CHANGE UP (ref 0–41)
ANION GAP SERPL CALC-SCNC: 15 MMOL/L — HIGH (ref 7–14)
APTT BLD: 28.4 SEC — SIGNIFICANT CHANGE UP (ref 27–39.2)
AST SERPL-CCNC: 31 U/L — SIGNIFICANT CHANGE UP (ref 0–41)
BASOPHILS # BLD AUTO: 0.03 K/UL — SIGNIFICANT CHANGE UP (ref 0–0.2)
BASOPHILS NFR BLD AUTO: 0.3 % — SIGNIFICANT CHANGE UP (ref 0–1)
BILIRUB SERPL-MCNC: 0.9 MG/DL — SIGNIFICANT CHANGE UP (ref 0.2–1.2)
BLD GP AB SCN SERPL QL: SIGNIFICANT CHANGE UP
BUN SERPL-MCNC: 12 MG/DL — SIGNIFICANT CHANGE UP (ref 10–20)
CALCIUM SERPL-MCNC: 8.7 MG/DL — SIGNIFICANT CHANGE UP (ref 8.4–10.5)
CHLORIDE SERPL-SCNC: 102 MMOL/L — SIGNIFICANT CHANGE UP (ref 98–110)
CO2 SERPL-SCNC: 24 MMOL/L — SIGNIFICANT CHANGE UP (ref 17–32)
CREAT SERPL-MCNC: 0.9 MG/DL — SIGNIFICANT CHANGE UP (ref 0.7–1.5)
EGFR: 68 ML/MIN/1.73M2 — SIGNIFICANT CHANGE UP
EOSINOPHIL # BLD AUTO: 0.07 K/UL — SIGNIFICANT CHANGE UP (ref 0–0.7)
EOSINOPHIL NFR BLD AUTO: 0.8 % — SIGNIFICANT CHANGE UP (ref 0–8)
GLUCOSE SERPL-MCNC: 102 MG/DL — HIGH (ref 70–99)
HCT VFR BLD CALC: 33.4 % — LOW (ref 37–47)
HGB BLD-MCNC: 11.3 G/DL — LOW (ref 12–16)
IMM GRANULOCYTES NFR BLD AUTO: 0.4 % — HIGH (ref 0.1–0.3)
INR BLD: 1.13 RATIO — SIGNIFICANT CHANGE UP (ref 0.65–1.3)
LYMPHOCYTES # BLD AUTO: 0.89 K/UL — LOW (ref 1.2–3.4)
LYMPHOCYTES # BLD AUTO: 10 % — LOW (ref 20.5–51.1)
MCHC RBC-ENTMCNC: 33.2 PG — HIGH (ref 27–31)
MCHC RBC-ENTMCNC: 33.8 G/DL — SIGNIFICANT CHANGE UP (ref 32–37)
MCV RBC AUTO: 98.2 FL — SIGNIFICANT CHANGE UP (ref 81–99)
MONOCYTES # BLD AUTO: 0.58 K/UL — SIGNIFICANT CHANGE UP (ref 0.1–0.6)
MONOCYTES NFR BLD AUTO: 6.5 % — SIGNIFICANT CHANGE UP (ref 1.7–9.3)
NEUTROPHILS # BLD AUTO: 7.31 K/UL — HIGH (ref 1.4–6.5)
NEUTROPHILS NFR BLD AUTO: 82 % — HIGH (ref 42.2–75.2)
NRBC # BLD: 0 /100 WBCS — SIGNIFICANT CHANGE UP (ref 0–0)
PLATELET # BLD AUTO: 191 K/UL — SIGNIFICANT CHANGE UP (ref 130–400)
PMV BLD: 9.6 FL — SIGNIFICANT CHANGE UP (ref 7.4–10.4)
POTASSIUM SERPL-MCNC: 4.6 MMOL/L — SIGNIFICANT CHANGE UP (ref 3.5–5)
POTASSIUM SERPL-SCNC: 4.6 MMOL/L — SIGNIFICANT CHANGE UP (ref 3.5–5)
PROT SERPL-MCNC: 5.5 G/DL — LOW (ref 6–8)
PROTHROM AB SERPL-ACNC: 12.9 SEC — HIGH (ref 9.95–12.87)
RBC # BLD: 3.4 M/UL — LOW (ref 4.2–5.4)
RBC # FLD: 11.9 % — SIGNIFICANT CHANGE UP (ref 11.5–14.5)
SODIUM SERPL-SCNC: 141 MMOL/L — SIGNIFICANT CHANGE UP (ref 135–146)
WBC # BLD: 8.92 K/UL — SIGNIFICANT CHANGE UP (ref 4.8–10.8)
WBC # FLD AUTO: 8.92 K/UL — SIGNIFICANT CHANGE UP (ref 4.8–10.8)

## 2023-08-26 PROCEDURE — 99239 HOSP IP/OBS DSCHRG MGMT >30: CPT

## 2023-08-26 PROCEDURE — 71045 X-RAY EXAM CHEST 1 VIEW: CPT | Mod: 26

## 2023-08-26 NOTE — ED PROVIDER NOTE - PROGRESS NOTE DETAILS
PS: Sutures placed by surgery. Pt monitored and no rebleeding. Pt feeling well, has appointment with Dr. Paddy Odell. Return precautions given

## 2023-08-26 NOTE — ED PROVIDER NOTE - PHYSICAL EXAMINATION
CONST: well appearing for age  HEAD:  normocephalic, atraumatic  EYES:  conjunctivae without injection, drainage or discharge  ENMT: nasal mucosa moist; mouth moist without ulcerations or lesions; throat moist without erythema, exudate, ulcerations or lesions  NECK:  supple  CARDIAC:  regular rate and rhythm, normal S1 and S2, no murmurs, rubs or gallops  CHEST: + wound to right chest with blood soaked gauze  RESP:  respiratory rate and effort appear normal for age; lungs are clear to auscultation bilaterally; no rales or wheezes  ABDOMEN:  soft, nontender, nondistended  MUSCULOSKELETAL/NEURO:  awake and alert, TAYLOR, normal movement, normal tone  SKIN:  normal skin color for age and race, well-perfused; warm and dry

## 2023-08-26 NOTE — ED ADULT NURSE REASSESSMENT NOTE - NS ED NURSE REASSESS COMMENT FT1
patient alert, oriented. seen by surgical team twice; no further bleeding. patient is discharged. will follow-up with physician.

## 2023-08-26 NOTE — ED PROVIDER NOTE - CARE PROVIDER_API CALL
Michele Maxwell  Thoracic and Cardiac Surgery  [address] [place] [address], [address] [address]  Phone: [phone]  Fax: [phone]  Follow Up Time: 1-3 Days

## 2023-08-26 NOTE — ED PROVIDER NOTE - CLINICAL SUMMARY MEDICAL DECISION MAKING FREE TEXT BOX
Presented for evaluation of bloody discharge from surgical wound.  Required labs and imaging.  Surgery consulted and they placed sutures in the wound.  Patient to be discharged to follow-up outpatient.

## 2023-08-26 NOTE — ED PROVIDER NOTE - NSFOLLOWUPINSTRUCTIONS_ED_ALL_ED_FT
You had stitches placed by surgery and were monitored for bleeding. You were cleared for discharge by surgery. Please follow up with Dr. Paddy Odell in the office. Return for worsening redness or pain
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
## 2023-08-26 NOTE — ED PROVIDER NOTE - ATTENDING CONTRIBUTION TO CARE
I personally evaluated the patient. I reviewed the Resident’s or Physician Assistant’s note (as assigned above), and agree with the findings and plan except as documented in my note.  72-year-old female with past medical history of HLD, cardiac murmur, pancreatic cyst, breast CA status post bilateral mastectomy with implants, history of lobectomy presents for evaluation of drainage from surgical site in the right upper quadrant.  Patient had right lung lobectomy done with VATS by Dr. Paddy Odell and was draining bloody fluid.  Patient denies any fever, abdominal pain. VSS, non toxic appearing, NAD, Head NCAT, MMM, neck supple, normal ROM, normal s1s2, lungs ctab, abd s/nt/nd, no guarding or rebound, extremities wnl, AAO x 3, GCS 15, neuro grossly normal.  1.5 cm abdominal wall surgical wound, no surrounding erythema or edema or fluctuance. Plan is labs, surgical consult and reassess.
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
## 2023-08-26 NOTE — ED PROVIDER NOTE - PATIENT PORTAL LINK FT
You can access the FollowMyHealth Patient Portal offered by [facility] by registering at the following website: [email or web address]. By joining [facility]’s FollowMyHealth portal, you will also be able to view your health information using other applications (apps) compatible with our system.

## 2023-08-29 ENCOUNTER — APPOINTMENT (OUTPATIENT)
Dept: ORTHOPEDIC SURGERY | Facility: CLINIC | Age: 73
End: 2023-08-29

## 2023-09-02 ENCOUNTER — RESULT REVIEW (OUTPATIENT)
Age: 73
End: 2023-09-02

## 2023-09-02 ENCOUNTER — OUTPATIENT (OUTPATIENT)
Dept: OUTPATIENT SERVICES | Facility: HOSPITAL | Age: 73
LOS: 1 days | End: 2023-09-02
Payer: MEDICARE

## 2023-09-02 DIAGNOSIS — Z98.890 OTHER SPECIFIED POSTPROCEDURAL STATES: Chronic | ICD-10-CM

## 2023-09-02 DIAGNOSIS — Z90.2 ACQUIRED ABSENCE OF LUNG [PART OF]: Chronic | ICD-10-CM

## 2023-09-02 DIAGNOSIS — Z90.10 ACQUIRED ABSENCE OF UNSPECIFIED BREAST AND NIPPLE: Chronic | ICD-10-CM

## 2023-09-02 DIAGNOSIS — R07.9 CHEST PAIN, UNSPECIFIED: ICD-10-CM

## 2023-09-02 DIAGNOSIS — Z98.82 BREAST IMPLANT STATUS: Chronic | ICD-10-CM

## 2023-09-02 DIAGNOSIS — Z98.41 CATARACT EXTRACTION STATUS, RIGHT EYE: Chronic | ICD-10-CM

## 2023-09-02 DIAGNOSIS — Z90.49 ACQUIRED ABSENCE OF OTHER SPECIFIED PARTS OF DIGESTIVE TRACT: Chronic | ICD-10-CM

## 2023-09-02 PROCEDURE — 71046 X-RAY EXAM CHEST 2 VIEWS: CPT

## 2023-09-02 PROCEDURE — 71046 X-RAY EXAM CHEST 2 VIEWS: CPT | Mod: 26

## 2023-09-03 DIAGNOSIS — R07.9 CHEST PAIN, UNSPECIFIED: ICD-10-CM

## 2023-09-05 ENCOUNTER — APPOINTMENT (OUTPATIENT)
Dept: CARDIOTHORACIC SURGERY | Facility: CLINIC | Age: 73
End: 2023-09-05
Payer: MEDICARE

## 2023-09-05 VITALS
BODY MASS INDEX: 18.35 KG/M2 | SYSTOLIC BLOOD PRESSURE: 120 MMHG | DIASTOLIC BLOOD PRESSURE: 72 MMHG | OXYGEN SATURATION: 98 % | TEMPERATURE: 98 F | HEIGHT: 59 IN | RESPIRATION RATE: 12 BRPM | HEART RATE: 91 BPM | WEIGHT: 91 LBS

## 2023-09-05 LAB — SURGICAL PATHOLOGY STUDY: SIGNIFICANT CHANGE UP

## 2023-09-05 PROCEDURE — 99213 OFFICE O/P EST LOW 20 MIN: CPT

## 2023-09-05 NOTE — PHYSICAL EXAM
[] : no respiratory distress [Auscultation Breath Sounds / Voice Sounds] : lungs were clear to auscultation bilaterally [Heart Rate And Rhythm] : heart rate was normal and rhythm regular [Heart Sounds] : normal S1 and S2 [Heart Sounds Gallop] : no gallops [Murmurs] : no murmurs [Heart Sounds Pericardial Friction Rub] : no pericardial rub [Site: ___] : Site: [unfilled] [Clean] : clean [Dry] : dry [Healing Well] : healing well [No Edema] : no edema
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
## 2023-09-07 NOTE — COUNSELING
[Hygeine (Including Daily Shower)] : hygeine (including daily shower) [Importance of Regular Medical Follow-Up] : the importance of regular medical follow-up [No Heavy Lifting] : no heavy lifting (>15-20 lb. for 1 month or 25 lb. for 3 months from date of surgery) [Blood Pressure Control] : blood pressure control [S/S of infection] : signs and symptoms of infection (and to whom it should be reported) [Progressive Ambulation/Activity] : progressive ambulation/activity [Medication/Vitamin/Herb/Food Interaction] : medication/vitamin/herb/food interaction [FreeTextEntry1] : Ms. CARLOS OLEA 72 year F  Patient is status post VATS. On arrival patient denies fever, chills nausea, and vomiting. Denies SOB or palpitation.  Patient restrictions on weight limitations (10lbs) were reviewed with the patient. Incision sites are to be washed daily with soap and water. Pat dry. Patient was educated to ambulate daily, do incentive spirometer 10X an hour until chest expansion feels close to normal. Patient was instructed to not drive while on narcotics for pain management and if they are on narcotics to use stool softeners to present constipation.

## 2023-09-07 NOTE — REASON FOR VISIT
[de-identified] : s/p VATS RLL wedge Resection  [de-identified] : 8/24/23 [de-identified] : Mrs. Young arrives today for a post op visit.

## 2023-09-08 DIAGNOSIS — D13.6 BENIGN NEOPLASM OF PANCREAS: ICD-10-CM

## 2023-09-08 DIAGNOSIS — Z90.2 ACQUIRED ABSENCE OF LUNG [PART OF]: ICD-10-CM

## 2023-09-08 DIAGNOSIS — Z90.11 ACQUIRED ABSENCE OF RIGHT BREAST AND NIPPLE: ICD-10-CM

## 2023-09-08 DIAGNOSIS — Z80.9 FAMILY HISTORY OF MALIGNANT NEOPLASM, UNSPECIFIED: ICD-10-CM

## 2023-09-08 DIAGNOSIS — J94.8 OTHER SPECIFIED PLEURAL CONDITIONS: ICD-10-CM

## 2023-09-08 DIAGNOSIS — K59.00 CONSTIPATION, UNSPECIFIED: ICD-10-CM

## 2023-09-08 DIAGNOSIS — Z88.0 ALLERGY STATUS TO PENICILLIN: ICD-10-CM

## 2023-09-08 DIAGNOSIS — R01.1 CARDIAC MURMUR, UNSPECIFIED: ICD-10-CM

## 2023-09-08 DIAGNOSIS — Z85.3 PERSONAL HISTORY OF MALIGNANT NEOPLASM OF BREAST: ICD-10-CM

## 2023-09-08 DIAGNOSIS — Z90.49 ACQUIRED ABSENCE OF OTHER SPECIFIED PARTS OF DIGESTIVE TRACT: ICD-10-CM

## 2023-09-08 DIAGNOSIS — J84.10 PULMONARY FIBROSIS, UNSPECIFIED: ICD-10-CM

## 2023-09-08 DIAGNOSIS — J98.59 OTHER DISEASES OF MEDIASTINUM, NOT ELSEWHERE CLASSIFIED: ICD-10-CM

## 2023-09-08 DIAGNOSIS — E78.00 PURE HYPERCHOLESTEROLEMIA, UNSPECIFIED: ICD-10-CM

## 2023-09-08 DIAGNOSIS — Z85.118 PERSONAL HISTORY OF OTHER MALIGNANT NEOPLASM OF BRONCHUS AND LUNG: ICD-10-CM

## 2023-09-08 DIAGNOSIS — Z98.41 CATARACT EXTRACTION STATUS, RIGHT EYE: ICD-10-CM

## 2023-09-08 DIAGNOSIS — K66.0 PERITONEAL ADHESIONS (POSTPROCEDURAL) (POSTINFECTION): ICD-10-CM

## 2023-09-19 ENCOUNTER — APPOINTMENT (OUTPATIENT)
Dept: CARDIOTHORACIC SURGERY | Facility: CLINIC | Age: 73
End: 2023-09-19
Payer: MEDICARE

## 2023-09-19 PROCEDURE — 99443: CPT | Mod: 95

## 2023-09-27 ENCOUNTER — NON-APPOINTMENT (OUTPATIENT)
Age: 73
End: 2023-09-27

## 2023-10-02 ENCOUNTER — APPOINTMENT (OUTPATIENT)
Dept: PULMONOLOGY | Facility: CLINIC | Age: 73
End: 2023-10-02
Payer: MEDICARE

## 2023-10-02 VITALS
SYSTOLIC BLOOD PRESSURE: 124 MMHG | HEART RATE: 81 BPM | HEIGHT: 59 IN | WEIGHT: 90 LBS | OXYGEN SATURATION: 98 % | BODY MASS INDEX: 18.14 KG/M2 | DIASTOLIC BLOOD PRESSURE: 56 MMHG

## 2023-10-02 DIAGNOSIS — R06.02 SHORTNESS OF BREATH: ICD-10-CM

## 2023-10-02 PROCEDURE — 99214 OFFICE O/P EST MOD 30 MIN: CPT

## 2023-10-05 ENCOUNTER — APPOINTMENT (OUTPATIENT)
Dept: ORTHOPEDIC SURGERY | Facility: CLINIC | Age: 73
End: 2023-10-05

## 2023-10-20 ENCOUNTER — OUTPATIENT (OUTPATIENT)
Dept: OUTPATIENT SERVICES | Facility: HOSPITAL | Age: 73
LOS: 1 days | End: 2023-10-20
Payer: MEDICARE

## 2023-10-20 DIAGNOSIS — Z90.49 ACQUIRED ABSENCE OF OTHER SPECIFIED PARTS OF DIGESTIVE TRACT: Chronic | ICD-10-CM

## 2023-10-20 DIAGNOSIS — R92.8 OTHER ABNORMAL AND INCONCLUSIVE FINDINGS ON DIAGNOSTIC IMAGING OF BREAST: ICD-10-CM

## 2023-10-20 DIAGNOSIS — Z98.41 CATARACT EXTRACTION STATUS, RIGHT EYE: Chronic | ICD-10-CM

## 2023-10-20 DIAGNOSIS — Z98.890 OTHER SPECIFIED POSTPROCEDURAL STATES: Chronic | ICD-10-CM

## 2023-10-20 DIAGNOSIS — Z90.10 ACQUIRED ABSENCE OF UNSPECIFIED BREAST AND NIPPLE: Chronic | ICD-10-CM

## 2023-10-20 DIAGNOSIS — Z12.31 ENCOUNTER FOR SCREENING MAMMOGRAM FOR MALIGNANT NEOPLASM OF BREAST: ICD-10-CM

## 2023-10-20 DIAGNOSIS — Z98.82 BREAST IMPLANT STATUS: Chronic | ICD-10-CM

## 2023-10-20 DIAGNOSIS — Z90.2 ACQUIRED ABSENCE OF LUNG [PART OF]: Chronic | ICD-10-CM

## 2023-10-20 PROCEDURE — 76641 ULTRASOUND BREAST COMPLETE: CPT | Mod: 26,LT

## 2023-10-20 PROCEDURE — 76641 ULTRASOUND BREAST COMPLETE: CPT | Mod: LT

## 2023-10-20 PROCEDURE — 77065 DX MAMMO INCL CAD UNI: CPT | Mod: 26,LT

## 2023-10-20 PROCEDURE — G0279: CPT

## 2023-10-20 PROCEDURE — 77065 DX MAMMO INCL CAD UNI: CPT | Mod: LT

## 2023-10-20 PROCEDURE — G0279: CPT | Mod: 26

## 2023-10-21 DIAGNOSIS — R92.8 OTHER ABNORMAL AND INCONCLUSIVE FINDINGS ON DIAGNOSTIC IMAGING OF BREAST: ICD-10-CM

## 2023-10-24 ENCOUNTER — APPOINTMENT (OUTPATIENT)
Dept: ORTHOPEDIC SURGERY | Facility: CLINIC | Age: 73
End: 2023-10-24

## 2023-11-03 DIAGNOSIS — Z92.89 PERSONAL HISTORY OF OTHER MEDICAL TREATMENT: ICD-10-CM

## 2023-11-03 DIAGNOSIS — Z78.0 ASYMPTOMATIC MENOPAUSAL STATE: ICD-10-CM

## 2023-11-21 ENCOUNTER — APPOINTMENT (OUTPATIENT)
Dept: ORTHOPEDIC SURGERY | Facility: CLINIC | Age: 73
End: 2023-11-21
Payer: MEDICARE

## 2023-11-21 DIAGNOSIS — M17.11 UNILATERAL PRIMARY OSTEOARTHRITIS, RIGHT KNEE: ICD-10-CM

## 2023-11-21 PROCEDURE — 99203 OFFICE O/P NEW LOW 30 MIN: CPT

## 2023-11-23 PROBLEM — M17.11 LOCALIZED OSTEOARTHRITIS OF RIGHT KNEE: Status: ACTIVE | Noted: 2022-07-05

## 2023-12-12 ENCOUNTER — APPOINTMENT (OUTPATIENT)
Dept: PULMONOLOGY | Facility: CLINIC | Age: 73
End: 2023-12-12
Payer: MEDICARE

## 2023-12-12 VITALS
HEIGHT: 59 IN | DIASTOLIC BLOOD PRESSURE: 72 MMHG | BODY MASS INDEX: 18.14 KG/M2 | OXYGEN SATURATION: 98 % | SYSTOLIC BLOOD PRESSURE: 110 MMHG | WEIGHT: 90 LBS | HEART RATE: 80 BPM

## 2023-12-12 DIAGNOSIS — C34.90 MALIGNANT NEOPLASM OF UNSPECIFIED PART OF UNSPECIFIED BRONCHUS OR LUNG: ICD-10-CM

## 2023-12-12 PROCEDURE — 99214 OFFICE O/P EST MOD 30 MIN: CPT

## 2023-12-19 RX ORDER — ALBUTEROL SULFATE 90 UG/1
108 (90 BASE) INHALANT RESPIRATORY (INHALATION)
Qty: 1 | Refills: 2 | Status: ACTIVE | COMMUNITY
Start: 2022-12-13 | End: 1900-01-01

## 2024-01-18 ENCOUNTER — APPOINTMENT (OUTPATIENT)
Dept: SURGERY | Facility: CLINIC | Age: 74
End: 2024-01-18
Payer: MEDICARE

## 2024-01-18 VITALS
DIASTOLIC BLOOD PRESSURE: 68 MMHG | SYSTOLIC BLOOD PRESSURE: 112 MMHG | HEIGHT: 59 IN | HEART RATE: 76 BPM | TEMPERATURE: 97.5 F | BODY MASS INDEX: 18.14 KG/M2 | WEIGHT: 90 LBS | OXYGEN SATURATION: 99 %

## 2024-01-18 DIAGNOSIS — K86.2 CYST OF PANCREAS: ICD-10-CM

## 2024-01-18 PROCEDURE — 99215 OFFICE O/P EST HI 40 MIN: CPT

## 2024-01-19 ENCOUNTER — NON-APPOINTMENT (OUTPATIENT)
Age: 74
End: 2024-01-19

## 2024-01-19 LAB
CANCER AG19-9 SERPL-ACNC: 5 U/ML
ESTIMATED AVERAGE GLUCOSE: 117 MG/DL
HBA1C MFR BLD HPLC: 5.7 %

## 2024-01-29 NOTE — DISCHARGE NOTE PROVIDER - PROVIDER RX CONTACT NUMBER
[facility]  ED Nurse Handoff Report    ED Chief complaint: Dizziness  . ED Diagnosis:   Final diagnoses:   Arachnoid cyst   Dizziness       Allergies:   Allergies   Allergen Reactions    Cats        Code Status: Full Code    Activity level - Baseline/Home:  independent.  Activity Level - Current:   independent.   Lift room needed: No.   Bariatric: No   Needed: No   Isolation: No.   Infection: Not Applicable.     Respiratory status: Room air    Vital Signs (within 30 minutes):   Vitals:    01/29/24 1510 01/29/24 1520 01/29/24 1550 01/29/24 1600   BP: (!) 140/85 (!) 140/85 (!) 142/90 (!) 139/90   Pulse: 75 85 103 93   Resp:       Temp:       SpO2: 99% 98% 97% 97%   Weight:           Cardiac Rhythm:  ,      Pain level:    Patient confused: No.   Patient Falls Risk: patient and family education.   Elimination Status: Has voided     Patient Report - Initial Complaint: Dizzy.   Focused Assessment: per provider note:   22 year old male presents to the ED for dizziness.  Patient states that he was driving to work this morning he felt dizzy, blurry vision and when he got out of the car he felt unsteady.  Denies any falls.  Patient states he had bumped his head around the end of November.  Patient states that symptoms have started around then.  He has a couple episodes of dizziness, vomiting, unsteadiness often.  Mom states that they have been in to primary care and ED to address the issue along with chest pressure that he had at the time.  He has had multiple EKGs that showed sinus rhythm.  He has no other history of TBI's, seizure.  Patient plays volleyball and golf.  Patient denies chest pain, shortness of breath.  Patient has never had imaging of the head.     Independent Historian:    Patient and mom.    Abnormal Results:   Labs Ordered and Resulted from Time of ED Arrival to Time of ED Departure   BASIC METABOLIC PANEL - Abnormal       Result Value    Sodium 133 (*)     Potassium 4.0       Chloride 98      Carbon Dioxide (CO2) 26      Anion Gap 9      Urea Nitrogen 9.6      Creatinine 0.70      GFR Estimate >90      Calcium 9.2      Glucose 101 (*)    CBC WITH PLATELETS AND DIFFERENTIAL    WBC Count 5.9      RBC Count 5.06      Hemoglobin 14.9      Hematocrit 44.1      MCV 87      MCH 29.4      MCHC 33.8      RDW 12.0      Platelet Count 229      % Neutrophils 60      % Lymphocytes 27      % Monocytes 7      % Eosinophils 4      % Basophils 1      % Immature Granulocytes 1      NRBCs per 100 WBC 0      Absolute Neutrophils 3.6      Absolute Lymphocytes 1.6      Absolute Monocytes 0.4      Absolute Eosinophils 0.2      Absolute Basophils 0.1      Absolute Immature Granulocytes 0.0      Absolute NRBCs 0.0          CT Head w/o Contrast   Final Result   IMPRESSION:    1. No acute intracranial pathology.    2. Right anterior temporal arachnoid cyst with associated mass effect.      FARRAH FELICIANO MD            SYSTEM ID:  [id number]      MR Brain w/o & w Contrast    (Results Pending)       Treatments provided: see MAR  Family Comments: aware  OBS brochure/video discussed/provided to patient:  Yes  ED Medications:   Medications   gadobutrol (GADAVIST) injection 14 mL (has no administration in time range)   gadobutrol (GADAVIST) injection 14 mL (has no administration in time range)   meclizine (ANTIVERT) tablet 25 mg (25 mg Oral $Given 1/29/24 1036)   LORazepam (ATIVAN) tablet 1 mg (1 mg Oral $Given 1/29/24 1611)       Drips infusing:  No  For the majority of the shift this patient was Green.   Interventions performed were na.    Sepsis treatment initiated: No    Cares/treatment/interventions/medications to be completed following ED care: na    ED Nurse Name: Leticia Fischer RN  4:40 PM    RECEIVING UNIT ED HANDOFF REVIEW    Above ED Nurse Handoff Report was reviewed: Yes  Reviewed by: Ronna Kramer RN on January 29, 2024 at 5:52 PM       [phone]

## 2024-02-01 NOTE — ASSESSMENT
[FreeTextEntry1] : IPMN (intraductal papillary mucinous neoplasm) (239.0) (D49.0) CARLOS OLEA is a pleasant 72 year year old woman who came in 02/28/2023 for IPMN. She has Dr SHANI MOSLEY as Her PCP.    72 year old white female self referred for medical oncology evaluation. PMH is significant for right breast cancer s/p mastectomy , adjuvant tamoxifen.  former smoker, with history of pT1c N0 Adenocarcinoma S/P RUL Lobectomy 2/2020 , EBONY lingular segmentectomy in 9/27/2021 for PT1a Adenocarcinoma.  Recent CT chest shows no evidence of disease.  History of multifocal side branch IPMN , stable MRI on 6/2020 and CT scan 12/2021.  COPD with dyspnea on exertion. no recent change in breathing or cough , no weight loss. No abdominal pain.    2/28/2023: MRI 2/17/2023 showed stable pancreatic body cystic lesions measured 1.4 and 1.7cm when compared to 6/1/2020 MRI, however grow from 2019 (1.1 and 0.9cm), no PD dilatation or mural nodule. will repeat MRI in one year.    1/18/24 Returns today for follow up for pancreatic cyst. Seen and examined with Dr. Kevin.  [facility]  MRI 1/9/24 Stable pancreatic lesions measuring up to 1.7cm. No mural nodule, enhancement or ductal dilation. Images reviewed with Dr. Kevin.  Plan -> tumor markers and A1C now. Repeat MRI in one year Encouraged to call office with any questions or concerns.   the above plan of care with discussed in details to the patient and all questions were answered to patient satisfaction. patient instructed to follow up with the referring physician and patient primary care provider   A total of 45 minutes was spent on this visit, obtaining h/p,  reviewing previous notes/imaging, counseling the patient on f/u , ordering tests (below), and documenting the findings in the note.

## 2024-02-01 NOTE — DATA REVIEWED
[FreeTextEntry1] : [facility] MRI abdomen 1/9/24-> Stable pancreatic lesions measuring up to 1.7cm. No mural nodule, enhancement or ductal dilation

## 2024-02-01 NOTE — HISTORY OF PRESENT ILLNESS
[de-identified] : CARLOS OLEA  is a pleasant 72 year year old woman  who came in 02/28/2023 for IPMN  . She has Dr SHANI MOSLEY as Her PCP.\par  \par  72 year old white female self referred for medical oncology evaluation. PMH is significant for right breast cancer s/p mastectomy , adjuvant tamoxifen. \par  former smoker, with history of pT1c N0 Adenocarcinoma S/P RUL Lobectomy 2/2020 , EBONY lingular segmentectomy in 9/27/2021 for PT1a Adenocarcinoma. \par  Recent CT chest shows no evidence of disease. \par  History of multifocal side branch IPMN , stable MRI on 6/2020 and CT scan 12/2021. \par  COPD with dyspnea on exertion. no recent change in breathing or cough , no weight loss. No abdominal pain. \par   [de-identified] : 1/18/24 follow up today for pancreatic cyst. Recent Bronchoscopy ,VATS, with lung resection ,R sided VATS with R lower lobe wedge resection, lymph node dissection levels 7-10 with CTS (August 2023)

## 2024-02-01 NOTE — REASON FOR VISIT
[Follow-Up: _____] : a [unfilled] follow-up visit [Follow-Up Visit] : a follow-up visit for [FreeTextEntry1] : ipmn [FreeTextEntry2] : pancreatic cyst

## 2024-02-01 NOTE — PHYSICAL EXAM
[Normal] : supple, no neck mass and thyroid not enlarged [Normal Neck Lymph Nodes] : normal neck lymph nodes  [Normal Supraclavicular Lymph Nodes] : normal supraclavicular lymph nodes [Normal Groin Lymph Nodes] : normal groin lymph nodes [Normal Axillary Lymph Nodes] : normal axillary lymph nodes [Normal] : oriented to person, place and time, with appropriate affect [Normal Breath Sounds] : Normal breath sounds [Normal Rate and Rhythm] : normal rate and rhythm [No HSM] : no hepatosplenomegaly [No Rash or Lesion] : No rash or lesion [Calm] : calm [de-identified] : soft NT, ND [Abdominal Masses] : No abdominal masses [Abdomen Tenderness] : ~T ~M No abdominal tenderness [de-identified] : well groomed

## 2024-02-05 ENCOUNTER — APPOINTMENT (OUTPATIENT)
Dept: HEMATOLOGY ONCOLOGY | Facility: CLINIC | Age: 74
End: 2024-02-05
Payer: MEDICARE

## 2024-02-05 ENCOUNTER — OUTPATIENT (OUTPATIENT)
Dept: OUTPATIENT SERVICES | Facility: HOSPITAL | Age: 74
LOS: 1 days | End: 2024-02-05
Payer: MEDICARE

## 2024-02-05 VITALS
RESPIRATION RATE: 12 BRPM | DIASTOLIC BLOOD PRESSURE: 62 MMHG | HEART RATE: 84 BPM | HEIGHT: 59 IN | WEIGHT: 90 LBS | SYSTOLIC BLOOD PRESSURE: 115 MMHG | TEMPERATURE: 97.1 F | OXYGEN SATURATION: 98 % | BODY MASS INDEX: 18.14 KG/M2

## 2024-02-05 DIAGNOSIS — Z90.10 ACQUIRED ABSENCE OF UNSPECIFIED BREAST AND NIPPLE: Chronic | ICD-10-CM

## 2024-02-05 DIAGNOSIS — Z98.41 CATARACT EXTRACTION STATUS, RIGHT EYE: Chronic | ICD-10-CM

## 2024-02-05 DIAGNOSIS — Z08 ENCOUNTER FOR FOLLOW-UP EXAMINATION AFTER COMPLETED TREATMENT FOR MALIGNANT NEOPLASM: ICD-10-CM

## 2024-02-05 DIAGNOSIS — Z85.118 ENCOUNTER FOR FOLLOW-UP EXAMINATION AFTER COMPLETED TREATMENT FOR MALIGNANT NEOPLASM: ICD-10-CM

## 2024-02-05 DIAGNOSIS — Z98.890 OTHER SPECIFIED POSTPROCEDURAL STATES: Chronic | ICD-10-CM

## 2024-02-05 DIAGNOSIS — Z98.82 BREAST IMPLANT STATUS: Chronic | ICD-10-CM

## 2024-02-05 DIAGNOSIS — Z90.2 ACQUIRED ABSENCE OF LUNG [PART OF]: Chronic | ICD-10-CM

## 2024-02-05 DIAGNOSIS — Z85.3 PERSONAL HISTORY OF MALIGNANT NEOPLASM OF BREAST: ICD-10-CM

## 2024-02-05 DIAGNOSIS — Z90.49 ACQUIRED ABSENCE OF OTHER SPECIFIED PARTS OF DIGESTIVE TRACT: Chronic | ICD-10-CM

## 2024-02-05 DIAGNOSIS — C34.90 MALIGNANT NEOPLASM OF UNSPECIFIED PART OF UNSPECIFIED BRONCHUS OR LUNG: ICD-10-CM

## 2024-02-05 PROCEDURE — 99213 OFFICE O/P EST LOW 20 MIN: CPT

## 2024-02-05 NOTE — CONSULT LETTER
[Dear  ___] : Dear  [unfilled], [Courtesy Letter:] : I had the pleasure of seeing your patient, [unfilled], in my office today. [Please see my note below.] : Please see my note below. [Referral Closing:] : Thank you very much for seeing this patient.  If you have any questions, please do not hesitate to contact me. [Sincerely,] : Sincerely, [FreeTextEntry3] : Dr. VIRAL Roberto

## 2024-02-05 NOTE — HISTORY OF PRESENT ILLNESS
[de-identified] : 72 year old white female self referred for medical oncology evaluation . PMH is significant for right breast cancer s/p mastectomy , adjuvant tamoxifen . \par  former smoker, with history of pT1c N0 Adenocarcinoma S/P RUL Lobectomy 2/2020 ,   EBONY lingular segmentectomy in  9/27/2021 for PT1a Adenocarcinoma. \par  Recent CT chest shows no evidence of disease. \par  History of multifocal side branch IPMN , stable MRI on 6/2020 and CT scan 12/2021 . \par  COPD with dyspnea on exertion . no recent change in breathing or cough , no weight loss . No abdominal pain . \par  Health maintenance : last mammogram oct 2022, colonoscopy 2 - 3 years ago . \par  FH : sister with multiple myeloma . father with colon cancer . \par   [de-identified] : 2/5/24: Pt presents for follow-up. She was found to have a 1.1 cm nodule in the RLL that was FDG avid. She subsequently underwent lung wedge resection on 8/23/23, with final pathology showing granulomatous organizing PNA, GMS, and AFB stain negative. She had a chest CT on 1/9/24, showing no suspicious new nodules, and an MRI of the abdomen showing stable pancreatic cystic lesions measuring up to 1.7 cm. Today, she is still recovering from her lung surgery with R chest wall soreness. Otherwise, she denies fever, chill, SOB, CP, or bowel movement changes.

## 2024-02-05 NOTE — ASSESSMENT
[FreeTextEntry1] : 73-year-old female former smoker with:  # Early-stage bilateral lung cancer ( NSCLC), GENARO s/p resection.  # History of R breast cancer s/p mastectomy and adjuvant endocrine therapy.  # History of low-risk multifocal side branch IPMN of the pancreas on surveillance for > 5 years.   Plan:  - The patient will continue routine surveillance with annual chest CT, mammogram, and MRI of the abdomen - she will also follow up with CT surgery and surgical oncology.  RTC in one year for follow-up.

## 2024-02-05 NOTE — PHYSICAL EXAM
Duplicate task [Normal] : RRR, normal S1S2, no murmurs, rubs, gallops [de-identified] : s/p right mastectomy with implant reconstruction , no evidence of recurrence , left breast s/p implant , no abnormal masses.

## 2024-02-06 DIAGNOSIS — C34.90 MALIGNANT NEOPLASM OF UNSPECIFIED PART OF UNSPECIFIED BRONCHUS OR LUNG: ICD-10-CM

## 2024-02-08 ENCOUNTER — NON-APPOINTMENT (OUTPATIENT)
Age: 74
End: 2024-02-08

## 2024-02-12 ENCOUNTER — APPOINTMENT (OUTPATIENT)
Dept: CARDIOLOGY | Facility: CLINIC | Age: 74
End: 2024-02-12
Payer: MEDICARE

## 2024-02-12 VITALS
HEIGHT: 59 IN | BODY MASS INDEX: 18.75 KG/M2 | DIASTOLIC BLOOD PRESSURE: 60 MMHG | HEART RATE: 62 BPM | SYSTOLIC BLOOD PRESSURE: 96 MMHG | WEIGHT: 93 LBS

## 2024-02-12 VITALS — SYSTOLIC BLOOD PRESSURE: 100 MMHG | DIASTOLIC BLOOD PRESSURE: 70 MMHG

## 2024-02-12 DIAGNOSIS — Z09 ENCOUNTER FOR FOLLOW-UP EXAMINATION AFTER COMPLETED TREATMENT FOR CONDITIONS OTHER THAN MALIGNANT NEOPLASM: ICD-10-CM

## 2024-02-12 DIAGNOSIS — I31.39 OTHER PERICARDIAL EFFUSION (NONINFLAMMATORY): ICD-10-CM

## 2024-02-12 DIAGNOSIS — D49.0 NEOPLASM OF UNSPECIFIED BEHAVIOR OF DIGESTIVE SYSTEM: ICD-10-CM

## 2024-02-12 DIAGNOSIS — R00.2 PALPITATIONS: ICD-10-CM

## 2024-02-12 PROCEDURE — 99214 OFFICE O/P EST MOD 30 MIN: CPT

## 2024-02-12 PROCEDURE — 93000 ELECTROCARDIOGRAM COMPLETE: CPT

## 2024-02-12 RX ORDER — OLOPATADINE HYDROCHLORIDE 1 MG/ML
0.1 SOLUTION OPHTHALMIC
Refills: 0 | Status: COMPLETED | COMMUNITY
End: 2024-02-12

## 2024-02-12 RX ORDER — BUDESONIDE AND FORMOTEROL FUMARATE DIHYDRATE 80; 4.5 UG/1; UG/1
80-4.5 AEROSOL RESPIRATORY (INHALATION)
Qty: 1 | Refills: 3 | Status: COMPLETED | COMMUNITY
Start: 2023-10-02 | End: 2024-02-12

## 2024-02-12 RX ORDER — IPRATROPIUM BROMIDE 21 UG/1
0.03 SPRAY NASAL
Refills: 0 | Status: COMPLETED | COMMUNITY
End: 2024-02-12

## 2024-02-12 RX ORDER — FLUTICASONE PROPIONATE 50 MCG
50 SPRAY, SUSPENSION NASAL
Refills: 0 | Status: ACTIVE | COMMUNITY

## 2024-02-12 NOTE — ASSESSMENT
[FreeTextEntry1] : The patient has BEASLEY which I suspect is multifactorial. The patient has had no chest pain . Nuclear stress test was negative for ischemia and echo showed normal LV systolic function with mod MR with Mild MVP . The patient has no pulmonary HTN and IVC is nrmal with normal collapse on echo from last year .   She states it does get better with Albuterol. No significant arrhythmia on MCOT  The patient may have pre DM

## 2024-02-12 NOTE — CARDIOLOGY SUMMARY
[___] : [unfilled] [de-identified] : 01/03/2022: Sinus bradycardia, possible left atrial enlargement 9- NSR NS T wave change.  4- Sinus Bradycardai  10-3-2022 NSR NS T wave change.  4-5-2023 NSR NS  Twave change.  2- NSR Ns  twve change .  [de-identified] : 1-2023 MCOT no significant arrhythmias  [de-identified] : 9- ETT Echo completed Stage II No ischedmia  Moderate MR mild TR RVSP was noermal\par   Lexiscan stress test No ischemia .  [de-identified] : 1-3-2022 Trivial echo free space  12- Normal LV systolic fucntion  Trivial pericarddial effusion  mild MVP mild TR mod MR Normal RVSP /IVC and IVC collapse  3- Normal LV systolic function mod MR mild MVP posterior mitral leaflet RVSP was 18 mmhg   [de-identified] : CT chest/abdomen/pelvis with IV contrast: 11 to 12 mm thick layer of pericardial fluid. New oval nodule LLL, groundglass area in RLL unchanged

## 2024-02-12 NOTE — HISTORY OF PRESENT ILLNESS
[FreeTextEntry1] : The patient has had no chest pain Some  BEASLEY . Known moderate MR and known chronic pericardial effusion . She has had lung CA and breast CA both of which is said to be stable and she is seeing Dr. Kevin for the cystic pancreatic lesion  thought to be IPMN   .

## 2024-03-18 ENCOUNTER — APPOINTMENT (OUTPATIENT)
Dept: PULMONOLOGY | Facility: CLINIC | Age: 74
End: 2024-03-18
Payer: MEDICARE

## 2024-03-18 VITALS
HEART RATE: 77 BPM | BODY MASS INDEX: 18.14 KG/M2 | DIASTOLIC BLOOD PRESSURE: 60 MMHG | WEIGHT: 90 LBS | OXYGEN SATURATION: 97 % | HEIGHT: 59 IN | SYSTOLIC BLOOD PRESSURE: 104 MMHG

## 2024-03-18 DIAGNOSIS — J45.991 COUGH VARIANT ASTHMA: ICD-10-CM

## 2024-03-18 DIAGNOSIS — R91.1 SOLITARY PULMONARY NODULE: ICD-10-CM

## 2024-03-18 DIAGNOSIS — Z85.118 PERSONAL HISTORY OF OTHER MALIGNANT NEOPLASM OF BRONCHUS AND LUNG: ICD-10-CM

## 2024-03-18 PROCEDURE — 99214 OFFICE O/P EST MOD 30 MIN: CPT

## 2024-03-18 PROCEDURE — G2211 COMPLEX E/M VISIT ADD ON: CPT

## 2024-03-18 NOTE — HISTORY OF PRESENT ILLNESS
[TextBox_4] : Patient coming for follow-up overall she is feeling good currently no cough wheezing or chest pain not requiring Symbicort her postnasal drip resolved been using azelastine as needed CT scan from January 2024 for reviewed patient has history of cough lung cancer status post resection first 1 and 2019-second 1/2022 told her we need to repeat CAT scan around June 2024 for 6 months follow-up Hematology oncology note reviewed Due for PFT

## 2024-03-18 NOTE — PLAN
[TextEntry] : Continue Symbicort as needed Azelastine at night for postnasal drip as needed CT scan ordered to be done in June 2024 told patient to call me after PFT ordered

## 2024-03-18 NOTE — REVIEW OF SYSTEMS
[Nasal Congestion] : no nasal congestion [Postnasal Drip] : no postnasal drip [Cough] : no cough [Chest Tightness] : no chest tightness [Dyspnea] : no dyspnea [SOB on Exertion] : no sob on exertion [Negative] : Endocrine

## 2024-03-22 ENCOUNTER — OUTPATIENT (OUTPATIENT)
Dept: OUTPATIENT SERVICES | Facility: HOSPITAL | Age: 74
LOS: 1 days | End: 2024-03-22
Payer: MEDICARE

## 2024-03-22 ENCOUNTER — APPOINTMENT (OUTPATIENT)
Dept: PULMONOLOGY | Facility: HOSPITAL | Age: 74
End: 2024-03-22
Payer: MEDICARE

## 2024-03-22 DIAGNOSIS — R06.02 SHORTNESS OF BREATH: ICD-10-CM

## 2024-03-22 DIAGNOSIS — Z98.82 BREAST IMPLANT STATUS: Chronic | ICD-10-CM

## 2024-03-22 DIAGNOSIS — Z98.41 CATARACT EXTRACTION STATUS, RIGHT EYE: Chronic | ICD-10-CM

## 2024-03-22 DIAGNOSIS — Z98.890 OTHER SPECIFIED POSTPROCEDURAL STATES: Chronic | ICD-10-CM

## 2024-03-22 DIAGNOSIS — Z90.49 ACQUIRED ABSENCE OF OTHER SPECIFIED PARTS OF DIGESTIVE TRACT: Chronic | ICD-10-CM

## 2024-03-22 DIAGNOSIS — Z90.2 ACQUIRED ABSENCE OF LUNG [PART OF]: Chronic | ICD-10-CM

## 2024-03-22 PROCEDURE — 94070 EVALUATION OF WHEEZING: CPT

## 2024-03-22 PROCEDURE — 94729 DIFFUSING CAPACITY: CPT | Mod: 26

## 2024-03-22 PROCEDURE — 94727 GAS DIL/WSHOT DETER LNG VOL: CPT | Mod: 26

## 2024-03-22 PROCEDURE — 94060 EVALUATION OF WHEEZING: CPT | Mod: 26

## 2024-03-22 PROCEDURE — 94729 DIFFUSING CAPACITY: CPT

## 2024-03-22 PROCEDURE — 94664 DEMO&/EVAL PT USE INHALER: CPT

## 2024-03-22 PROCEDURE — 94727 GAS DIL/WSHOT DETER LNG VOL: CPT

## 2024-03-23 DIAGNOSIS — R06.02 SHORTNESS OF BREATH: ICD-10-CM

## 2024-03-29 ENCOUNTER — APPOINTMENT (OUTPATIENT)
Dept: CARDIOLOGY | Facility: CLINIC | Age: 74
End: 2024-03-29
Payer: MEDICARE

## 2024-03-29 DIAGNOSIS — I07.1 RHEUMATIC TRICUSPID INSUFFICIENCY: ICD-10-CM

## 2024-03-29 DIAGNOSIS — I34.0 NONRHEUMATIC MITRAL (VALVE) INSUFFICIENCY: ICD-10-CM

## 2024-03-29 PROCEDURE — 93306 TTE W/DOPPLER COMPLETE: CPT

## 2024-04-01 ENCOUNTER — APPOINTMENT (OUTPATIENT)
Dept: UROLOGY | Facility: CLINIC | Age: 74
End: 2024-04-01

## 2024-04-07 PROBLEM — I07.1 MODERATE TRICUSPID REGURGITATION: Status: ACTIVE | Noted: 2018-12-12

## 2024-04-07 PROBLEM — I34.0 MODERATE MITRAL REGURGITATION: Status: ACTIVE | Noted: 2018-12-12

## 2024-05-01 ENCOUNTER — LABORATORY RESULT (OUTPATIENT)
Age: 74
End: 2024-05-01

## 2024-05-02 ENCOUNTER — LABORATORY RESULT (OUTPATIENT)
Age: 74
End: 2024-05-02

## 2024-05-02 ENCOUNTER — APPOINTMENT (OUTPATIENT)
Dept: UROLOGY | Facility: CLINIC | Age: 74
End: 2024-05-02
Payer: MEDICARE

## 2024-05-02 VITALS
TEMPERATURE: 98.1 F | OXYGEN SATURATION: 100 % | DIASTOLIC BLOOD PRESSURE: 63 MMHG | HEIGHT: 59 IN | BODY MASS INDEX: 18.14 KG/M2 | HEART RATE: 67 BPM | SYSTOLIC BLOOD PRESSURE: 105 MMHG | RESPIRATION RATE: 18 BRPM | WEIGHT: 90 LBS

## 2024-05-02 DIAGNOSIS — R31.29 OTHER MICROSCOPIC HEMATURIA: ICD-10-CM

## 2024-05-02 PROCEDURE — 99204 OFFICE O/P NEW MOD 45 MIN: CPT

## 2024-05-06 ENCOUNTER — LABORATORY RESULT (OUTPATIENT)
Age: 74
End: 2024-05-06

## 2024-05-06 ENCOUNTER — APPOINTMENT (OUTPATIENT)
Dept: UROLOGY | Facility: CLINIC | Age: 74
End: 2024-05-06
Payer: MEDICARE

## 2024-05-06 DIAGNOSIS — N39.0 URINARY TRACT INFECTION, SITE NOT SPECIFIED: ICD-10-CM

## 2024-05-06 DIAGNOSIS — A49.9 URINARY TRACT INFECTION, SITE NOT SPECIFIED: ICD-10-CM

## 2024-05-06 PROCEDURE — 99214 OFFICE O/P EST MOD 30 MIN: CPT

## 2024-05-06 RX ORDER — NITROFURANTOIN (MONOHYDRATE/MACROCRYSTALS) 25; 75 MG/1; MG/1
100 CAPSULE ORAL TWICE DAILY
Qty: 14 | Refills: 0 | Status: ACTIVE | COMMUNITY
Start: 2024-05-06 | End: 1900-01-01

## 2024-05-07 NOTE — ADDENDUM
[FreeTextEntry1] : Documented by MARLEN Lopez acting as a scribe for Dr. Trudi Barrera   All medical record entries made by the Scribe were at my, Dr. Barrera direction and personally dictated by me.  I have reviewed the chart and agree that the record accurately reflects my personal performance of the history, physical exam, procedure and imaging.

## 2024-05-07 NOTE — HISTORY OF PRESENT ILLNESS
[FreeTextEntry1] : Ngoc is a 73-year-old history of lung cancer and breast cancer. Referred to office by medical doctor for consultation for microscopic hematuria.  In 2021 she had urine testing which showed 6 red blood cells and in 2024 she had urine testing which showed 8 red blood cells.  Former smoker.  Prior urologic history of overactive bladder on Vesicare with good efficacy.  Currently denies any dysuria and gross hematuria.  Reports feeling well.  Had MRI of abdomen and pelvis for pancreatic cyst.  Kidneys show symmetric enhancement.  No hydronephrosis or masses. Urinary bladder unremarkable.

## 2024-05-07 NOTE — ASSESSMENT
[FreeTextEntry1] : Ngoc is a 73-year-old history of lung cancer and breast cancer. Referred to office by medical doctor for consultation for microscopic hematuria.  In 2021 she had urine testing which showed 6 red blood cells and in 2024 she had urine testing which showed 8 red blood cells.  Former smoker.  Prior urologic history of overactive bladder on Vesicare with good efficacy.  Currently denies any dysuria and gross hematuria.  Reports feeling well.  Had MRI of abdomen and pelvis for pancreatic cyst.  Kidneys show symmetric enhancement.  No hydronephrosis or masses. Urinary bladder unremarkable.  Plan  73-year-old history of lung cancer and breast cancer. Presents for microhematuria.  MRI report reviewed with patient.  Recommend urine testing including cytology.  Follow-up next week for cystoscopy. Indication and risks reviewed. Rationale to rule out bladder cancer clearly outlined.  She will continue Vesicare for overactive bladder as she been on this medication for many years with good efficacy. Started by prior Urologist.   Patient verbalized understanding and is agreeable.

## 2024-05-07 NOTE — LETTER BODY
[Dear  ___] : Dear  [unfilled], [Consult Letter:] : I had the pleasure of evaluating your patient, [unfilled]. [Please see my note below.] : Please see my note below. [Sincerely,] : Sincerely, [FreeTextEntry3] : Trudi Barrera MD, FACS

## 2024-05-08 PROBLEM — N39.0 URINARY TRACT INFECTION: Status: ACTIVE | Noted: 2024-05-08

## 2024-05-08 LAB
APPEARANCE: CLEAR
BACTERIA UR CULT: NORMAL
BILIRUB UR QL STRIP: NORMAL
BILIRUBIN URINE: NEGATIVE
BLOOD URINE: ABNORMAL
COLLECTION METHOD: NORMAL
COLOR: YELLOW
GLUCOSE QUALITATIVE U: NEGATIVE
GLUCOSE UR-MCNC: NORMAL
HCG UR QL: 0.2 EU/DL
HGB UR QL STRIP.AUTO: NORMAL
KETONES UR-MCNC: NORMAL
KETONES URINE: NEGATIVE
LEUKOCYTE ESTERASE UR QL STRIP: NORMAL
LEUKOCYTE ESTERASE URINE: NEGATIVE
NITRITE UR QL STRIP: NORMAL
NITRITE URINE: NEGATIVE
PH UR STRIP: 7.5
PH URINE: 8
PROT UR STRIP-MCNC: NORMAL
PROTEIN URINE: ABNORMAL
SP GR UR STRIP: 1.02
SPECIFIC GRAVITY URINE: 1.02
UROBILINOGEN URINE: NORMAL

## 2024-05-08 NOTE — HISTORY OF PRESENT ILLNESS
[FreeTextEntry1] : Ngoc is a 73-year-old history of lung cancer and breast cancer. Referred to our office by her medical doctor for consultation - microscopic hematuria.  In 2021 she had urine testing which showed 6 red blood cells and in 2024 she had urine testing which showed 8 red blood cells.  Former smoker.  Prior urologic history of overactive bladder on Vesicare with good efficacy.  Currently denies any dysuria and gross hematuria.  Reports feeling well.  Had MRI of abdomen and pelvis for pancreatic cyst.  Kidneys show symmetric enhancement.  No hydronephrosis or masses. Urinary bladder unremarkable.  her urine culture was positive for a UTI on 5/2/2024 - enterococcus 10-49K the patient had a repeat culture with > 3 organisms suspect contaimnation   [Urinary Urgency] : urinary urgency [Urinary Frequency] : urinary frequency [Nocturia] : nocturia [Weak Stream] : weak stream

## 2024-05-08 NOTE — ASSESSMENT
[FreeTextEntry1] : Ngoc is a 73-year-old history of lung cancer and breast cancer. Referred to office by medical doctor for consultation for microscopic hematuria.  In 2021 she had urine testing which showed 6 red blood cells and in 2024 she had urine testing which showed 8 red blood cells.  Former smoker.  Prior urologic history of overactive bladder on Vesicare with good efficacy.  Currently denies any dysuria and gross hematuria.  Reports feeling well.  Had MRI of abdomen and pelvis for pancreatic cyst.  Kidneys show symmetric enhancement.  No hydronephrosis or masses. Urinary bladder unremarkable.  Plan  73-year-old history of lung cancer and breast cancer. Presents for microhematuria.  MRI report reviewed with patient.  UTI treated - will re-schedule cystoscopy  cytology pending   She will continue Vesicare for overactive bladder as she been on this medication for many years with good efficacy. Started by prior Urologist.   [Urinary Tract Infection (599.0\N39.0)] : qualitative ~C was positive

## 2024-05-12 NOTE — ASU PATIENT PROFILE, ADULT - FALL HARM RISK - TYPE OF ASSESSMENT
Goal Outcome Evaluation:      Plan of Care Reviewed With: patient    Overall Patient Progress: no change    Patient AOx4. Pain at left arm managed with PRN oxycodone and robaxin. Patient appear to be resting and sleeping during rounds. Patient takes meds whole with thin liquids. On RN manage Potassium, ordered in AM for re-check. Continent of Bowel and bladder. Continue POC.           Patient's most recent vital signs are:     Vital signs:  BP: 122/59  Temp: 98.1  HR: 60  RR: 16  SpO2: 92 %     Patient does not have new respiratory symptoms.  Patient does not have new sore throat.  Patient does not have a fever greater than 99.5.                  Admission

## 2024-05-20 ENCOUNTER — LABORATORY RESULT (OUTPATIENT)
Age: 74
End: 2024-05-20

## 2024-05-23 ENCOUNTER — NON-APPOINTMENT (OUTPATIENT)
Age: 74
End: 2024-05-23

## 2024-05-24 RX ORDER — SULFAMETHOXAZOLE AND TRIMETHOPRIM 800; 160 MG/1; MG/1
800-160 TABLET ORAL
Qty: 14 | Refills: 0 | Status: COMPLETED | COMMUNITY
Start: 2024-05-22 | End: 2024-05-24

## 2024-05-24 RX ORDER — NITROFURANTOIN (MONOHYDRATE/MACROCRYSTALS) 25; 75 MG/1; MG/1
100 CAPSULE ORAL
Qty: 10 | Refills: 0 | Status: ACTIVE | COMMUNITY
Start: 2024-05-24 | End: 1900-01-01

## 2024-06-08 ENCOUNTER — EMERGENCY (EMERGENCY)
Facility: HOSPITAL | Age: 74
LOS: 0 days | Discharge: HOME | End: 2024-06-08
Attending: EMERGENCY MEDICINE
Payer: MEDICARE

## 2024-06-08 VITALS
DIASTOLIC BLOOD PRESSURE: 66 MMHG | WEIGHT: 110.01 LBS | HEIGHT: 66 IN | OXYGEN SATURATION: 100 % | RESPIRATION RATE: 18 BRPM | HEART RATE: 70 BPM | TEMPERATURE: 98 F | SYSTOLIC BLOOD PRESSURE: 130 MMHG

## 2024-06-08 DIAGNOSIS — Z98.41 CATARACT EXTRACTION STATUS, RIGHT EYE: Chronic | ICD-10-CM

## 2024-06-08 DIAGNOSIS — Z88.0 ALLERGY STATUS TO PENICILLIN: ICD-10-CM

## 2024-06-08 DIAGNOSIS — Z98.890 OTHER SPECIFIED POSTPROCEDURAL STATES: Chronic | ICD-10-CM

## 2024-06-08 DIAGNOSIS — R20.2 PARESTHESIA OF SKIN: ICD-10-CM

## 2024-06-08 DIAGNOSIS — Z98.82 BREAST IMPLANT STATUS: Chronic | ICD-10-CM

## 2024-06-08 DIAGNOSIS — Z90.10 ACQUIRED ABSENCE OF UNSPECIFIED BREAST AND NIPPLE: Chronic | ICD-10-CM

## 2024-06-08 DIAGNOSIS — Z87.440 PERSONAL HISTORY OF URINARY (TRACT) INFECTIONS: ICD-10-CM

## 2024-06-08 DIAGNOSIS — T37.8X5A ADVERSE EFFECT OF OTHER SPECIFIED SYSTEMIC ANTI-INFECTIVES AND ANTIPARASITICS, INITIAL ENCOUNTER: ICD-10-CM

## 2024-06-08 DIAGNOSIS — Z90.49 ACQUIRED ABSENCE OF OTHER SPECIFIED PARTS OF DIGESTIVE TRACT: Chronic | ICD-10-CM

## 2024-06-08 DIAGNOSIS — Z90.2 ACQUIRED ABSENCE OF LUNG [PART OF]: Chronic | ICD-10-CM

## 2024-06-08 LAB
ANION GAP SERPL CALC-SCNC: 13 MMOL/L — SIGNIFICANT CHANGE UP (ref 7–14)
BASOPHILS # BLD AUTO: 0.03 K/UL — SIGNIFICANT CHANGE UP (ref 0–0.2)
BASOPHILS NFR BLD AUTO: 0.3 % — SIGNIFICANT CHANGE UP (ref 0–1)
BUN SERPL-MCNC: 12 MG/DL — SIGNIFICANT CHANGE UP (ref 10–20)
CALCIUM SERPL-MCNC: 9.8 MG/DL — SIGNIFICANT CHANGE UP (ref 8.4–10.5)
CHLORIDE SERPL-SCNC: 99 MMOL/L — SIGNIFICANT CHANGE UP (ref 98–110)
CO2 SERPL-SCNC: 28 MMOL/L — SIGNIFICANT CHANGE UP (ref 17–32)
CREAT SERPL-MCNC: 0.8 MG/DL — SIGNIFICANT CHANGE UP (ref 0.7–1.5)
EGFR: 78 ML/MIN/1.73M2 — SIGNIFICANT CHANGE UP
EOSINOPHIL # BLD AUTO: 0.16 K/UL — SIGNIFICANT CHANGE UP (ref 0–0.7)
EOSINOPHIL NFR BLD AUTO: 1.4 % — SIGNIFICANT CHANGE UP (ref 0–8)
GLUCOSE SERPL-MCNC: 91 MG/DL — SIGNIFICANT CHANGE UP (ref 70–99)
HCT VFR BLD CALC: 44.1 % — SIGNIFICANT CHANGE UP (ref 37–47)
HGB BLD-MCNC: 14.7 G/DL — SIGNIFICANT CHANGE UP (ref 12–16)
IMM GRANULOCYTES NFR BLD AUTO: 0.4 % — HIGH (ref 0.1–0.3)
LYMPHOCYTES # BLD AUTO: 1.02 K/UL — LOW (ref 1.2–3.4)
LYMPHOCYTES # BLD AUTO: 9.2 % — LOW (ref 20.5–51.1)
MCHC RBC-ENTMCNC: 32.5 PG — HIGH (ref 27–31)
MCHC RBC-ENTMCNC: 33.3 G/DL — SIGNIFICANT CHANGE UP (ref 32–37)
MCV RBC AUTO: 97.6 FL — SIGNIFICANT CHANGE UP (ref 81–99)
MONOCYTES # BLD AUTO: 0.47 K/UL — SIGNIFICANT CHANGE UP (ref 0.1–0.6)
MONOCYTES NFR BLD AUTO: 4.3 % — SIGNIFICANT CHANGE UP (ref 1.7–9.3)
NEUTROPHILS # BLD AUTO: 9.32 K/UL — HIGH (ref 1.4–6.5)
NEUTROPHILS NFR BLD AUTO: 84.4 % — HIGH (ref 42.2–75.2)
NRBC # BLD: 0 /100 WBCS — SIGNIFICANT CHANGE UP (ref 0–0)
PLATELET # BLD AUTO: 234 K/UL — SIGNIFICANT CHANGE UP (ref 130–400)
PMV BLD: 9.6 FL — SIGNIFICANT CHANGE UP (ref 7.4–10.4)
POTASSIUM SERPL-MCNC: 4.1 MMOL/L — SIGNIFICANT CHANGE UP (ref 3.5–5)
POTASSIUM SERPL-SCNC: 4.1 MMOL/L — SIGNIFICANT CHANGE UP (ref 3.5–5)
RBC # BLD: 4.52 M/UL — SIGNIFICANT CHANGE UP (ref 4.2–5.4)
RBC # FLD: 11.9 % — SIGNIFICANT CHANGE UP (ref 11.5–14.5)
SODIUM SERPL-SCNC: 140 MMOL/L — SIGNIFICANT CHANGE UP (ref 135–146)
WBC # BLD: 11.04 K/UL — HIGH (ref 4.8–10.8)
WBC # FLD AUTO: 11.04 K/UL — HIGH (ref 4.8–10.8)

## 2024-06-08 PROCEDURE — 99284 EMERGENCY DEPT VISIT MOD MDM: CPT | Mod: FS

## 2024-06-08 PROCEDURE — 93010 ELECTROCARDIOGRAM REPORT: CPT

## 2024-06-08 PROCEDURE — 96375 TX/PRO/DX INJ NEW DRUG ADDON: CPT

## 2024-06-08 PROCEDURE — 85025 COMPLETE CBC W/AUTO DIFF WBC: CPT

## 2024-06-08 PROCEDURE — 36415 COLL VENOUS BLD VENIPUNCTURE: CPT

## 2024-06-08 PROCEDURE — 93005 ELECTROCARDIOGRAM TRACING: CPT

## 2024-06-08 PROCEDURE — 80048 BASIC METABOLIC PNL TOTAL CA: CPT

## 2024-06-08 PROCEDURE — 96374 THER/PROPH/DIAG INJ IV PUSH: CPT

## 2024-06-08 PROCEDURE — 99284 EMERGENCY DEPT VISIT MOD MDM: CPT | Mod: 25

## 2024-06-08 RX ORDER — EPINEPHRINE 0.3 MG/.3ML
0.3 INJECTION INTRAMUSCULAR; SUBCUTANEOUS
Qty: 1 | Refills: 0
Start: 2024-06-08 | End: 2024-06-08

## 2024-06-08 RX ORDER — FAMOTIDINE 10 MG/ML
20 INJECTION INTRAVENOUS ONCE
Refills: 0 | Status: COMPLETED | OUTPATIENT
Start: 2024-06-08 | End: 2024-06-08

## 2024-06-08 RX ORDER — ACETAMINOPHEN 500 MG
650 TABLET ORAL ONCE
Refills: 0 | Status: DISCONTINUED | OUTPATIENT
Start: 2024-06-08 | End: 2024-06-08

## 2024-06-08 RX ORDER — DIPHENHYDRAMINE HCL 50 MG
50 CAPSULE ORAL ONCE
Refills: 0 | Status: COMPLETED | OUTPATIENT
Start: 2024-06-08 | End: 2024-06-08

## 2024-06-08 RX ADMIN — Medication 50 MILLIGRAM(S): at 14:18

## 2024-06-08 RX ADMIN — Medication 125 MILLIGRAM(S): at 14:17

## 2024-06-08 RX ADMIN — FAMOTIDINE 20 MILLIGRAM(S): 10 INJECTION INTRAVENOUS at 14:19

## 2024-06-08 NOTE — ED PROVIDER NOTE - OBJECTIVE STATEMENT
Pt is a 74y/o female here for eval of throat tingling 30 min after taking dose of macrobid earlier today. pt denies fever, chills, n/v, rash, abd pain

## 2024-06-08 NOTE — ED PROVIDER NOTE - PROGRESS NOTE DETAILS
pt feels back to baseline, will send script for epi pen, pt sinctructed to dc the macrobid and to see uro on monday for scheduled appt

## 2024-06-08 NOTE — ED PROVIDER NOTE - PATIENT PORTAL LINK FT
You can access the FollowMyHealth Patient Portal offered by [facility] by registering at the following website: [email or web address]. By joining [employer]’s FollowMyHealth portal, you will also be able to view your health information using other applications (apps) compatible with our system.

## 2024-06-08 NOTE — ED PROVIDER NOTE - PHYSICAL EXAMINATION
VITAL SIGNS: I have reviewed nursing notes and confirm.  CONSTITUTIONAL: Well-developed; well-nourished; in no acute distress.   SKIN: skin exam is warm and dry, no acute rash.    HEAD: Normocephalic; atraumatic.  EYES: conjunctiva and sclera clear.  ENT: no sublingual edema, speaking full sentences  No nasal discharge; airway clear.  CARD: S1, S2 normal; no murmurs, gallops, or rubs. Regular rate and rhythm.   RESP: No wheezes, rales or rhonchi.  EXT: Normal ROM.  No clubbing, cyanosis or edema.   NEURO: Alert, oriented, grossly unremarkable

## 2024-06-08 NOTE — ED PROVIDER NOTE - ATTENDING APP SHARED VISIT CONTRIBUTION OF CARE
I have personally performed a history and physical exam on this patient and personally directed the management of the patient. Patient is a 73-year-old female presents for evaluation of potential allergic reaction and states that she had throat "tingling" 30 minutes after initiating dose of Macrobid for presumed urinary tract infection she denies any associated shortness of breath nausea vomiting fevers chills chest pain palpitations    On physical exam patient is well-appearing normocephalic atraumatic pupils equally round reactive light and accommodation able to speak in full sentences no drooling no distress noted pupils equally round react light accommodation extraocular muscles intact oropharynx clear chest clear to auscultation bilaterally abdomen soft nontender nondistended bowel sounds positive no guarding rebound extremities full range of motion no focal deficits noted no rashes noted    Assessment plan patient presents for potential allergic reaction however no evidence of uvular swelling tongue tongue swelling lip swelling wheezing no respiratory distress patient given steroids Benadryl Pepcid initial discussion regarding epinephrine however patient improved she was observed here in the emergency department back to baseline I will discharge at this time Rx provided for EpiPen advised to discontinue Macrobid

## 2024-06-08 NOTE — ED PROVIDER NOTE - CLINICAL SUMMARY MEDICAL DECISION MAKING FREE TEXT BOX
Patient is a 73-year-old female presents for evaluation of potential allergic reaction and states that she had throat "tingling" 30 minutes after initiating dose of Macrobid for presumed urinary tract infection she denies any associated shortness of breath nausea vomiting fevers chills chest pain palpitations    On physical exam patient is well-appearing normocephalic atraumatic pupils equally round reactive light and accommodation able to speak in full sentences no drooling no distress noted pupils equally round react light accommodation extraocular muscles intact oropharynx clear chest clear to auscultation bilaterally abdomen soft nontender nondistended bowel sounds positive no guarding rebound extremities full range of motion no focal deficits noted no rashes noted    Assessment plan patient presents for potential allergic reaction however no evidence of uvular swelling tongue tongue swelling lip swelling wheezing no respiratory distress patient given steroids Benadryl Pepcid initial discussion regarding epinephrine however patient improved she was observed here in the emergency department back to baseline I will discharge at this time Rx provided for EpiPen advised to discontinue Macrobid

## 2024-06-10 ENCOUNTER — APPOINTMENT (OUTPATIENT)
Dept: UROLOGY | Facility: CLINIC | Age: 74
End: 2024-06-10
Payer: MEDICARE

## 2024-06-10 DIAGNOSIS — R31.29 OTHER MICROSCOPIC HEMATURIA: ICD-10-CM

## 2024-06-10 LAB
BILIRUB UR QL STRIP: NORMAL
COLLECTION METHOD: NORMAL
GLUCOSE UR-MCNC: NORMAL
HCG UR QL: 0.2 EU/DL
HGB UR QL STRIP.AUTO: NORMAL
KETONES UR-MCNC: NORMAL
LEUKOCYTE ESTERASE UR QL STRIP: NORMAL
NITRITE UR QL STRIP: NORMAL
PH UR STRIP: 7
PROT UR STRIP-MCNC: NORMAL
SP GR UR STRIP: 1.02

## 2024-06-10 PROCEDURE — 52000 CYSTOURETHROSCOPY: CPT

## 2024-06-10 PROCEDURE — 81003 URINALYSIS AUTO W/O SCOPE: CPT | Mod: QW

## 2024-06-11 PROBLEM — R31.29 MICROSCOPIC HEMATURIA: Status: ACTIVE | Noted: 2024-05-07

## 2024-06-20 ENCOUNTER — NON-APPOINTMENT (OUTPATIENT)
Age: 74
End: 2024-06-20

## 2024-06-27 ENCOUNTER — APPOINTMENT (OUTPATIENT)
Dept: ENDOCRINOLOGY | Facility: CLINIC | Age: 74
End: 2024-06-27

## 2024-07-12 ENCOUNTER — APPOINTMENT (OUTPATIENT)
Dept: CARDIOLOGY | Facility: CLINIC | Age: 74
End: 2024-07-12
Payer: MEDICARE

## 2024-07-12 VITALS — HEART RATE: 59 BPM | DIASTOLIC BLOOD PRESSURE: 62 MMHG | SYSTOLIC BLOOD PRESSURE: 102 MMHG

## 2024-07-12 VITALS — WEIGHT: 92 LBS | BODY MASS INDEX: 18.58 KG/M2

## 2024-07-12 VITALS — TEMPERATURE: 97.6 F | HEIGHT: 59 IN

## 2024-07-12 DIAGNOSIS — R00.2 PALPITATIONS: ICD-10-CM

## 2024-07-12 DIAGNOSIS — I31.39 OTHER PERICARDIAL EFFUSION (NONINFLAMMATORY): ICD-10-CM

## 2024-07-12 DIAGNOSIS — I34.0 NONRHEUMATIC MITRAL (VALVE) INSUFFICIENCY: ICD-10-CM

## 2024-07-12 DIAGNOSIS — I07.1 RHEUMATIC TRICUSPID INSUFFICIENCY: ICD-10-CM

## 2024-07-12 DIAGNOSIS — D49.0 NEOPLASM OF UNSPECIFIED BEHAVIOR OF DIGESTIVE SYSTEM: ICD-10-CM

## 2024-07-12 DIAGNOSIS — C34.90 MALIGNANT NEOPLASM OF UNSPECIFIED PART OF UNSPECIFIED BRONCHUS OR LUNG: ICD-10-CM

## 2024-07-12 DIAGNOSIS — Z85.118 PERSONAL HISTORY OF OTHER MALIGNANT NEOPLASM OF BRONCHUS AND LUNG: ICD-10-CM

## 2024-07-12 PROCEDURE — 99214 OFFICE O/P EST MOD 30 MIN: CPT

## 2024-07-12 PROCEDURE — 93000 ELECTROCARDIOGRAM COMPLETE: CPT

## 2024-09-14 NOTE — ED ADULT TRIAGE NOTE - CCCP TRG CHIEF CMPLNT
-Due to bacterial meningitis  -Keep patient awake during the day and avoid daytime naps. Remove all unnecessary lines (central lines, peripheral IVs, feeding tubes, ho catheters).  -Avoid polypharmacy, frequent re-orientation, maximize family time at bedside, use glasses and hearing aids if needed, treat pain, encourage ambulation, minimize benzos/anticholinergic agents.   -Aspiration precautions  -Seizure precautions  -Fall precautions     allergic reaction

## 2024-09-18 ENCOUNTER — APPOINTMENT (OUTPATIENT)
Dept: PULMONOLOGY | Facility: CLINIC | Age: 74
End: 2024-09-18
Payer: MEDICARE

## 2024-09-18 VITALS
OXYGEN SATURATION: 99 % | WEIGHT: 93 LBS | BODY MASS INDEX: 18.75 KG/M2 | SYSTOLIC BLOOD PRESSURE: 98 MMHG | HEART RATE: 67 BPM | HEIGHT: 59 IN | DIASTOLIC BLOOD PRESSURE: 52 MMHG

## 2024-09-18 DIAGNOSIS — R91.1 SOLITARY PULMONARY NODULE: ICD-10-CM

## 2024-09-18 DIAGNOSIS — C34.90 MALIGNANT NEOPLASM OF UNSPECIFIED PART OF UNSPECIFIED BRONCHUS OR LUNG: ICD-10-CM

## 2024-09-18 DIAGNOSIS — J45.991 COUGH VARIANT ASTHMA: ICD-10-CM

## 2024-09-18 PROCEDURE — 99214 OFFICE O/P EST MOD 30 MIN: CPT

## 2024-09-18 PROCEDURE — G2211 COMPLEX E/M VISIT ADD ON: CPT

## 2024-09-18 NOTE — HISTORY OF PRESENT ILLNESS
US completed JO Rios RN RDMS   [TextBox_4] : Patient coming for follow-up overall she is doing good no cough wheezing shortness of breath doing good with the Symbicort PFT from March reviewed CT scan from September 2024 reviewed overall 11 mm groundglass nodule stable in size but this nodule did get bigger in June compared to previous 1 the case was presented in the thoracic meeting lesion too small recommend follow up  I informed the patient about the risk of malignancy especially in her condition that this lesion high likely might be a slow-growing tumor we need to follow it closely other option is to refer for resection but as per the surgeon is too small especially at this not solid groundglass the recommendation is to follow closely

## 2024-09-18 NOTE — PLAN
[TextEntry] : ct scan ordered to be done jan 2025  i told the patient that cancer still high in dif diagnosis  of repeat ct scan any further increase with size of become more solid will refer for pet scan and resection  continue symbicort  azelastine as needed

## 2024-10-22 ENCOUNTER — OUTPATIENT (OUTPATIENT)
Dept: OUTPATIENT SERVICES | Facility: HOSPITAL | Age: 74
LOS: 1 days | End: 2024-10-22
Payer: MEDICARE

## 2024-10-22 DIAGNOSIS — Z90.2 ACQUIRED ABSENCE OF LUNG [PART OF]: Chronic | ICD-10-CM

## 2024-10-22 DIAGNOSIS — Z98.41 CATARACT EXTRACTION STATUS, RIGHT EYE: Chronic | ICD-10-CM

## 2024-10-22 DIAGNOSIS — Z00.8 ENCOUNTER FOR OTHER GENERAL EXAMINATION: ICD-10-CM

## 2024-10-22 DIAGNOSIS — R92.2 INCONCLUSIVE MAMMOGRAM: ICD-10-CM

## 2024-10-22 DIAGNOSIS — Z90.49 ACQUIRED ABSENCE OF OTHER SPECIFIED PARTS OF DIGESTIVE TRACT: Chronic | ICD-10-CM

## 2024-10-22 DIAGNOSIS — Z98.82 BREAST IMPLANT STATUS: Chronic | ICD-10-CM

## 2024-10-22 DIAGNOSIS — Z98.890 OTHER SPECIFIED POSTPROCEDURAL STATES: Chronic | ICD-10-CM

## 2024-10-22 DIAGNOSIS — Z90.10 ACQUIRED ABSENCE OF UNSPECIFIED BREAST AND NIPPLE: Chronic | ICD-10-CM

## 2024-10-22 DIAGNOSIS — Z12.31 ENCOUNTER FOR SCREENING MAMMOGRAM FOR MALIGNANT NEOPLASM OF BREAST: ICD-10-CM

## 2024-10-22 PROCEDURE — 76641 ULTRASOUND BREAST COMPLETE: CPT | Mod: LT

## 2024-10-22 PROCEDURE — 77063 BREAST TOMOSYNTHESIS BI: CPT | Mod: 52

## 2024-10-22 PROCEDURE — 77067 SCR MAMMO BI INCL CAD: CPT | Mod: 52

## 2024-10-22 PROCEDURE — 77067 SCR MAMMO BI INCL CAD: CPT | Mod: 26,52,LT

## 2024-10-22 PROCEDURE — 76641 ULTRASOUND BREAST COMPLETE: CPT | Mod: 26,LT

## 2024-10-23 DIAGNOSIS — R92.2 INCONCLUSIVE MAMMOGRAM: ICD-10-CM

## 2024-10-23 DIAGNOSIS — Z12.31 ENCOUNTER FOR SCREENING MAMMOGRAM FOR MALIGNANT NEOPLASM OF BREAST: ICD-10-CM

## 2024-11-25 ENCOUNTER — APPOINTMENT (OUTPATIENT)
Dept: ENDOCRINOLOGY | Facility: CLINIC | Age: 74
End: 2024-11-25
Payer: MEDICARE

## 2024-11-25 VITALS
SYSTOLIC BLOOD PRESSURE: 118 MMHG | HEIGHT: 59 IN | DIASTOLIC BLOOD PRESSURE: 68 MMHG | BODY MASS INDEX: 18.35 KG/M2 | WEIGHT: 91 LBS | OXYGEN SATURATION: 98 % | HEART RATE: 70 BPM

## 2024-11-25 DIAGNOSIS — M81.0 AGE-RELATED OSTEOPOROSIS W/OUT CURRENT PATHOLOGICAL FRACTURE: ICD-10-CM

## 2024-11-25 PROCEDURE — 99203 OFFICE O/P NEW LOW 30 MIN: CPT

## 2024-12-02 ENCOUNTER — APPOINTMENT (OUTPATIENT)
Facility: CLINIC | Age: 74
End: 2024-12-02
Payer: MEDICARE

## 2024-12-02 VITALS — HEIGHT: 59 IN | WEIGHT: 90 LBS | BODY MASS INDEX: 18.14 KG/M2

## 2024-12-02 DIAGNOSIS — M19.071 PRIMARY OSTEOARTHRITIS, RIGHT ANKLE AND FOOT: ICD-10-CM

## 2024-12-02 PROCEDURE — 99214 OFFICE O/P EST MOD 30 MIN: CPT

## 2024-12-02 PROCEDURE — 73610 X-RAY EXAM OF ANKLE: CPT | Mod: RT

## 2024-12-19 ENCOUNTER — APPOINTMENT (OUTPATIENT)
Dept: SURGERY | Facility: CLINIC | Age: 74
End: 2024-12-19

## 2024-12-19 VITALS
DIASTOLIC BLOOD PRESSURE: 68 MMHG | HEIGHT: 59 IN | BODY MASS INDEX: 18.14 KG/M2 | SYSTOLIC BLOOD PRESSURE: 102 MMHG | WEIGHT: 90 LBS

## 2024-12-19 DIAGNOSIS — D49.0 NEOPLASM OF UNSPECIFIED BEHAVIOR OF DIGESTIVE SYSTEM: ICD-10-CM

## 2024-12-19 PROCEDURE — 99213 OFFICE O/P EST LOW 20 MIN: CPT

## 2024-12-25 PROBLEM — F10.90 ALCOHOL USE: Status: RESOLVED | Noted: 2017-06-07 | Resolved: 2022-01-03

## 2024-12-28 ENCOUNTER — NON-APPOINTMENT (OUTPATIENT)
Age: 74
End: 2024-12-28

## 2025-01-02 ENCOUNTER — NON-APPOINTMENT (OUTPATIENT)
Age: 75
End: 2025-01-02

## 2025-01-03 ENCOUNTER — APPOINTMENT (OUTPATIENT)
Dept: CARDIOLOGY | Facility: CLINIC | Age: 75
End: 2025-01-03
Payer: MEDICARE

## 2025-01-03 VITALS — DIASTOLIC BLOOD PRESSURE: 60 MMHG | SYSTOLIC BLOOD PRESSURE: 108 MMHG | HEART RATE: 60 BPM

## 2025-01-03 VITALS — WEIGHT: 90 LBS | BODY MASS INDEX: 18.14 KG/M2 | HEIGHT: 59 IN

## 2025-01-03 DIAGNOSIS — E78.5 HYPERLIPIDEMIA, UNSPECIFIED: ICD-10-CM

## 2025-01-03 DIAGNOSIS — R07.9 CHEST PAIN, UNSPECIFIED: ICD-10-CM

## 2025-01-03 DIAGNOSIS — I34.0 NONRHEUMATIC MITRAL (VALVE) INSUFFICIENCY: ICD-10-CM

## 2025-01-03 DIAGNOSIS — R06.02 SHORTNESS OF BREATH: ICD-10-CM

## 2025-01-03 DIAGNOSIS — I07.1 RHEUMATIC TRICUSPID INSUFFICIENCY: ICD-10-CM

## 2025-01-03 DIAGNOSIS — Z09 ENCOUNTER FOR FOLLOW-UP EXAMINATION AFTER COMPLETED TREATMENT FOR CONDITIONS OTHER THAN MALIGNANT NEOPLASM: ICD-10-CM

## 2025-01-03 PROCEDURE — 93000 ELECTROCARDIOGRAM COMPLETE: CPT

## 2025-01-03 PROCEDURE — 99214 OFFICE O/P EST MOD 30 MIN: CPT

## 2025-01-10 ENCOUNTER — RESULT REVIEW (OUTPATIENT)
Age: 75
End: 2025-01-10

## 2025-01-10 ENCOUNTER — OUTPATIENT (OUTPATIENT)
Dept: OUTPATIENT SERVICES | Facility: HOSPITAL | Age: 75
LOS: 1 days | End: 2025-01-10
Payer: MEDICARE

## 2025-01-10 DIAGNOSIS — Z90.10 ACQUIRED ABSENCE OF UNSPECIFIED BREAST AND NIPPLE: Chronic | ICD-10-CM

## 2025-01-10 DIAGNOSIS — Z98.890 OTHER SPECIFIED POSTPROCEDURAL STATES: Chronic | ICD-10-CM

## 2025-01-10 DIAGNOSIS — Z90.2 ACQUIRED ABSENCE OF LUNG [PART OF]: Chronic | ICD-10-CM

## 2025-01-10 DIAGNOSIS — Z98.82 BREAST IMPLANT STATUS: Chronic | ICD-10-CM

## 2025-01-10 DIAGNOSIS — Z00.8 ENCOUNTER FOR OTHER GENERAL EXAMINATION: ICD-10-CM

## 2025-01-10 DIAGNOSIS — Z90.49 ACQUIRED ABSENCE OF OTHER SPECIFIED PARTS OF DIGESTIVE TRACT: Chronic | ICD-10-CM

## 2025-01-10 DIAGNOSIS — R07.9 CHEST PAIN, UNSPECIFIED: ICD-10-CM

## 2025-01-10 DIAGNOSIS — Z98.41 CATARACT EXTRACTION STATUS, RIGHT EYE: Chronic | ICD-10-CM

## 2025-01-10 PROCEDURE — 78452 HT MUSCLE IMAGE SPECT MULT: CPT

## 2025-01-10 PROCEDURE — A9500: CPT

## 2025-01-10 PROCEDURE — 93018 CV STRESS TEST I&R ONLY: CPT

## 2025-01-10 PROCEDURE — 78452 HT MUSCLE IMAGE SPECT MULT: CPT | Mod: 26

## 2025-01-11 DIAGNOSIS — R07.9 CHEST PAIN, UNSPECIFIED: ICD-10-CM

## 2025-01-13 ENCOUNTER — APPOINTMENT (OUTPATIENT)
Dept: PULMONOLOGY | Facility: CLINIC | Age: 75
End: 2025-01-13
Payer: MEDICARE

## 2025-01-13 VITALS
SYSTOLIC BLOOD PRESSURE: 124 MMHG | HEIGHT: 59 IN | HEART RATE: 71 BPM | RESPIRATION RATE: 14 BRPM | DIASTOLIC BLOOD PRESSURE: 72 MMHG | WEIGHT: 293 LBS | BODY MASS INDEX: 59.07 KG/M2 | OXYGEN SATURATION: 98 %

## 2025-01-13 DIAGNOSIS — C34.90 MALIGNANT NEOPLASM OF UNSPECIFIED PART OF UNSPECIFIED BRONCHUS OR LUNG: ICD-10-CM

## 2025-01-13 DIAGNOSIS — Z85.118 PERSONAL HISTORY OF OTHER MALIGNANT NEOPLASM OF BRONCHUS AND LUNG: ICD-10-CM

## 2025-01-13 DIAGNOSIS — R09.82 POSTNASAL DRIP: ICD-10-CM

## 2025-01-13 DIAGNOSIS — J45.991 COUGH VARIANT ASTHMA: ICD-10-CM

## 2025-01-13 PROCEDURE — G2211 COMPLEX E/M VISIT ADD ON: CPT

## 2025-01-13 PROCEDURE — 99214 OFFICE O/P EST MOD 30 MIN: CPT

## 2025-01-13 RX ORDER — AZELASTINE HYDROCHLORIDE 137 UG/1
0.1 SPRAY, METERED NASAL
Qty: 1 | Refills: 3 | Status: ACTIVE | COMMUNITY
Start: 2025-01-13 | End: 1900-01-01

## 2025-01-20 ENCOUNTER — APPOINTMENT (OUTPATIENT)
Facility: CLINIC | Age: 75
End: 2025-01-20
Payer: MEDICARE

## 2025-01-20 DIAGNOSIS — M19.071 PRIMARY OSTEOARTHRITIS, RIGHT ANKLE AND FOOT: ICD-10-CM

## 2025-01-20 PROCEDURE — 99214 OFFICE O/P EST MOD 30 MIN: CPT | Mod: 25

## 2025-01-20 PROCEDURE — 20605 DRAIN/INJ JOINT/BURSA W/O US: CPT | Mod: RT

## 2025-02-03 ENCOUNTER — APPOINTMENT (OUTPATIENT)
Age: 75
End: 2025-02-03
Payer: MEDICARE

## 2025-02-03 ENCOUNTER — OUTPATIENT (OUTPATIENT)
Dept: OUTPATIENT SERVICES | Facility: HOSPITAL | Age: 75
LOS: 1 days | End: 2025-02-03
Payer: MEDICARE

## 2025-02-03 VITALS
TEMPERATURE: 98.3 F | SYSTOLIC BLOOD PRESSURE: 113 MMHG | BODY MASS INDEX: 18.14 KG/M2 | WEIGHT: 90 LBS | OXYGEN SATURATION: 98 % | RESPIRATION RATE: 16 BRPM | HEIGHT: 59 IN | HEART RATE: 67 BPM | DIASTOLIC BLOOD PRESSURE: 65 MMHG

## 2025-02-03 DIAGNOSIS — Z85.118 PERSONAL HISTORY OF OTHER MALIGNANT NEOPLASM OF BRONCHUS AND LUNG: ICD-10-CM

## 2025-02-03 DIAGNOSIS — Z98.890 OTHER SPECIFIED POSTPROCEDURAL STATES: Chronic | ICD-10-CM

## 2025-02-03 DIAGNOSIS — Z90.10 ACQUIRED ABSENCE OF UNSPECIFIED BREAST AND NIPPLE: Chronic | ICD-10-CM

## 2025-02-03 DIAGNOSIS — Z90.2 ACQUIRED ABSENCE OF LUNG [PART OF]: Chronic | ICD-10-CM

## 2025-02-03 DIAGNOSIS — Z98.82 BREAST IMPLANT STATUS: Chronic | ICD-10-CM

## 2025-02-03 DIAGNOSIS — Z98.41 CATARACT EXTRACTION STATUS, RIGHT EYE: Chronic | ICD-10-CM

## 2025-02-03 DIAGNOSIS — Z90.49 ACQUIRED ABSENCE OF OTHER SPECIFIED PARTS OF DIGESTIVE TRACT: Chronic | ICD-10-CM

## 2025-02-03 DIAGNOSIS — C34.90 MALIGNANT NEOPLASM OF UNSPECIFIED PART OF UNSPECIFIED BRONCHUS OR LUNG: ICD-10-CM

## 2025-02-03 PROCEDURE — 99213 OFFICE O/P EST LOW 20 MIN: CPT

## 2025-02-25 NOTE — ASU PREOP CHECKLIST - WEIGHT IN LBS
Orthopedic Clinic Post-Operative Note    CHIEF COMPLAINT:   Chief Complaint   Patient presents with    Surgical Followup      open reduction and internal fixation right trimalleolar ankle fracture-dislocation DOS 2-11-25       HISTORY OF PRESENT ILLNESS  Location: Right ankle  Rating of Pain: 0 out of 10 currently but can be mild  Pain is better with: Rest  Pain improving overall: Yes  Associated Features: Denies numbness or tingling shooting burning electric pain.  Denies any problems with the incision or any fevers chills nausea or vomiting.  Patient concerns: Wondering the plan  Additional History: Patient had a fall on the ice and had a fracture dislocation of the right ankle with was trimalleolar ankle fracture.  Emergency department was not able to keep it reduced.  Patient was admitted and Dr. Farfan was consulted to do an open reduction internal fixation so than the ankle fracture could stay reduced.  This surgery took place on 2/11/2025.  Patient stayed in the hospital overnight and was discharged to home after that and was put on aspirin 325 mg once a day x 4 weeks and also was touchdown weightbearing x 4 weeks.  The plan was to start formal physical therapy at about 4 weeks to work on range of motion and gentle strengthening.  Patient was to follow-up in 2 weeks with myself to get the splint off in a cam boot on and range of motion started.  Patient was discharged on oxycodone 5 to 10 mg.  Patient has been off of Tylenol as well as oxycodone since Friday.  Patient has noticed a little bit of throbbing every once well but otherwise he is not having much for pain.  Patient denies any numbness or tingling.  Patient has been doing his touchdown weightbearing only so far.  Patient is taking his aspirin.    Patient's past medical, surgical, social and family histories reviewed.     REVIEW OF SYSTEMS  Review of systems negative other than positive findings in HPI.    Physical Exam:  Vitals: Temp 97.3  F (36.3  " C) (Temporal)   Ht 1.803 m (5' 11\")   Wt 91.2 kg (201 lb)   BMI 28.03 kg/m    BMI= Body mass index is 28.03 kg/m .  Constitutional: healthy, alert and no acute distress   Psychiatric: mentation appears normal and affect normal/bright  NEURO: no focal deficits, CMS intact right lower extremity  RESP: Normal with easy respirations and no use of accessory muscles to breathe, no audible wheezing or retractions  CV: Calf soft and nontender to palpation, leg warm, +2 dorsalis pedis pulse  SKIN: Medial and lateral incisions healing well with no erythema swelling or drainage and sutures intact.  Sutures are removed today.  The rest of the ankle with swelling but no erythema, rashes, excoriation, or breakdown. No evidence of infection.   MUSCULOSKELETAL:  INSPECTION of right ankle: Incisions as mentioned above.  We do notice swelling about the right ankle but no gross deformities, erythema, edema, ecchymosis, atrophy or fasciculations.   PALPATION: No tenderness medial or lateral malleolus or the calcaneus foot toes or Achilles tendon.   ROM: Passive: plantar flex to 15 and dorsal flex to 0, moving all toes.  The range of motion is without catching locking or pain.    STRENGTH: 4 out of 5 plantar flexion and dorsiflexion as well as flexor hallucis longus and extensor hallucis longus without pain.   SPECIAL TEST: none  GAIT: Not observed today.  Lymph: no palpable lymph nodes    Diagnostic Modalities:  Recent Results (from the past 744 hours)   XR Ankle Right 2 Views    Narrative    EXAM: XR ANKLE RIGHT 2 VIEWS  LOCATION: [facility]  DATE: 2/11/2025    INDICATION: injury,  COMPARISON: None.      Impression    IMPRESSION:     The talus is dislocated laterally and posteriorly relative to the distal tibia. There is an acute appearing, comminuted and displaced fracture of the distal fibula with apex volar and valgus angulation. There are also acute, displaced fractures of the   medial and " posterior malleoli.    XR Ankle Right 1 View    Narrative    EXAM: XR ANKLE RIGHT 1 VIEW  LOCATION: [facility]  DATE: 2/11/2025    INDICATION: Ankle pain. Fracture.  COMPARISON: 2/11/2025 radiographs obtained at 0743 hours.      Impression    IMPRESSION: Acute fracture-dislocation of the right ankle with moderately displaced fractures of the medial and lateral malleoli and lateral dislocation of the talus. No significant interval change since the prior study.   Ankle XR, G/E 3 views, right    Narrative    EXAM: XR ANKLE RIGHT G/E 3 VIEWS  LOCATION: [facility]  DATE: 2/11/2025    INDICATION: SP reduction.  COMPARISON: Same-day radiograph.      Impression    IMPRESSION: Portable single-view assisted/stress radiograph shows improved alignment of the tibiotalar dislocation. The medial malleolus and distal fibula/lateral malleolus fracture alignment also has improved. Soft tissue swelling.   Ankle XR, G/E 3 views, right    Narrative    EXAM: XR ANKLE RIGHT G/E 3 VIEWS  LOCATION: [facility]  DATE: 2/11/2025    INDICATION: sp reduction  COMPARISON: 12/11/2025      Impression    IMPRESSION: Interval reduction of the fracture dislocation of the right ankle with improved alignment from initial prereduction radiographs, though there is increased lateral translation of the talus relative to the tibial plafond when compared to   pre-splinting post reduction radiographs earlier today. Displaced medial malleolar fracture fragment within the medial clear space. Mildly impacted and moderately displaced oblique fracture of the distal fibula with extension to the distal tibiofibular   syndesmosis. Splinting material about the right ankle. Soft tissue swelling.   CT Ankle Right w/o Contrast    Narrative    EXAM: CT ANKLE RIGHT W/O CONTRAST  LOCATION: [facility]  DATE: 2/11/2025    INDICATION: fracture  dislocation  COMPARISON: Radiograph 2/11/2025  TECHNIQUE: Noncontrast. Axial, sagittal and coronal thin-section reconstruction. Dose reduction techniques were used.     FINDINGS:     BONES:  -Reduced right ankle fracture dislocation. Mildly displaced posterior malleolus fracture with fracture fragment accounting for approximately 7 mm of the articular surface. There is 3 mm of posterior displacement and 2 mm of proximal displacement. There   are small adjacent fracture fragments. Displaced obliquely oriented fracture of the anteromedial tibia involving the medial metaphysis anteriorly and extending into the base of the medial malleolus. There is a 1 cm lateral displacement of the distal   fracture fragment which is distally displaced and rotated. There are small adjacent comminuted fracture fragments. Comminuted fracture of the distal fibula at the level of the tibial plafond with posterior and lateral displacement of the dominant distal   fracture fragment and apex anterior angulation. There is an additional posteriorly displaced butterfly fragment superiorly. Additional small comminuted fracture fragments are present.  -There is lateral and mild posterior subluxation of the talus at the tibiotalar joint.  -Small tibiotalar joint hemarthrosis which extravasates into the surrounding soft tissues.  -Joint spaces are otherwise preserved.  -Small type I accessory navicular bone.  -Benign bone island in the medial cuneiform.    SOFT TISSUES:  -Nonorganized blood products and stranding about the fractures.  -The medial malleolus fracture fragment comes in close proximity to the anterior tibialis tendon which otherwise appears normal.  -Tendons are grossly normal. No gross tendinous entrapment.  -Muscles are grossly intrinsically normal.      Impression    IMPRESSION:  1.  Reduced right ankle fracture dislocation with mildly displaced posterior malleolus fracture, displaced obliquely oriented fracture of the anteromedial  tibia involving the medial metaphysis and base of the medial malleolus, and comminuted and   displaced fracture of the distal fibula at the level of the tibial plafond.  2.  Persistent lateral and mild posterior subluxation of the talus at the tibiotalar joint.         XR Surgery ALEX L/T 5 Min Fluoro w Stills    Narrative    This exam was marked as non-reportable because it will not be read by a   radiologist or a [place] non-radiologist provider.           I agree with above readings.    X-rays done today showing good hardware placement no hardware failure and no change in positioning the fracture sites.  Good and equal congruent mortise is noted.  No other fracture dislocation or tumor no mineralization seen over the fracture sites yet.      Independent visualization of the images was performed.      Impression: 1.  1.  15 days status post Open reduction internal fixation of right trimalleolar ankle fracture dislocation by Dr. Adolph Mckenzie    FOCUSED PLAN:   Patient is doing very well without any major complaints.  Patient is using his rolling walker.  Patient has maintained his touchdown weightbearing status.  Splint removed today patient placed into a cam boot.  Patient educated on doing range of motion 3-5 times a day.  Patient to have the cam boot on when up and ambulating.  Patient has been off oxycodone and Tylenol since Friday and not having much pain.  Patient to continue elevating above heart level is much as possible and icing.  Formal physical therapy order was put in previously and is set to begin on 3/6/2025.  They will work on range of motion and gentle strengthening.  Patient will follow-up in 2 weeks and at that time if everything looks good he can weight-bear as tolerated.  AVS with all the information created for the patient and his wife.  Follow-up in 2 weeks with x-ray.    Re-x-ray on return: Yes AP lateral and mortise view of right ankle      This note was dictated with [employer].    George Funes PA-C           [phone]

## 2025-04-07 ENCOUNTER — APPOINTMENT (OUTPATIENT)
Dept: CARDIOLOGY | Facility: CLINIC | Age: 75
End: 2025-04-07
Payer: MEDICARE

## 2025-04-07 DIAGNOSIS — I34.0 NONRHEUMATIC MITRAL (VALVE) INSUFFICIENCY: ICD-10-CM

## 2025-04-07 DIAGNOSIS — I07.1 RHEUMATIC TRICUSPID INSUFFICIENCY: ICD-10-CM

## 2025-04-07 PROCEDURE — 93306 TTE W/DOPPLER COMPLETE: CPT

## 2025-04-09 ENCOUNTER — APPOINTMENT (OUTPATIENT)
Dept: ORTHOPEDIC SURGERY | Facility: CLINIC | Age: 75
End: 2025-04-09
Payer: MEDICARE

## 2025-04-09 DIAGNOSIS — M19.071 PRIMARY OSTEOARTHRITIS, RIGHT ANKLE AND FOOT: ICD-10-CM

## 2025-04-09 PROCEDURE — 20605 DRAIN/INJ JOINT/BURSA W/O US: CPT | Mod: RT

## 2025-04-09 PROCEDURE — 99213 OFFICE O/P EST LOW 20 MIN: CPT | Mod: 25

## 2025-04-25 ENCOUNTER — APPOINTMENT (OUTPATIENT)
Dept: CARDIOLOGY | Facility: CLINIC | Age: 75
End: 2025-04-25
Payer: MEDICARE

## 2025-04-25 ENCOUNTER — RESULT CHARGE (OUTPATIENT)
Age: 75
End: 2025-04-25

## 2025-04-25 ENCOUNTER — NON-APPOINTMENT (OUTPATIENT)
Age: 75
End: 2025-04-25

## 2025-04-25 VITALS
DIASTOLIC BLOOD PRESSURE: 60 MMHG | HEART RATE: 57 BPM | HEIGHT: 59 IN | WEIGHT: 90 LBS | BODY MASS INDEX: 18.14 KG/M2 | SYSTOLIC BLOOD PRESSURE: 104 MMHG

## 2025-04-25 DIAGNOSIS — I34.0 NONRHEUMATIC MITRAL (VALVE) INSUFFICIENCY: ICD-10-CM

## 2025-04-25 DIAGNOSIS — E78.5 HYPERLIPIDEMIA, UNSPECIFIED: ICD-10-CM

## 2025-04-25 DIAGNOSIS — R06.02 SHORTNESS OF BREATH: ICD-10-CM

## 2025-04-25 PROCEDURE — 99214 OFFICE O/P EST MOD 30 MIN: CPT

## 2025-04-25 PROCEDURE — 93000 ELECTROCARDIOGRAM COMPLETE: CPT

## 2025-04-28 DIAGNOSIS — C34.90 MALIGNANT NEOPLASM OF UNSPECIFIED PART OF UNSPECIFIED BRONCHUS OR LUNG: ICD-10-CM

## 2025-04-28 DIAGNOSIS — R91.1 SOLITARY PULMONARY NODULE: ICD-10-CM

## 2025-05-12 ENCOUNTER — APPOINTMENT (OUTPATIENT)
Dept: PULMONOLOGY | Facility: CLINIC | Age: 75
End: 2025-05-12

## 2025-05-21 ENCOUNTER — EMERGENCY (EMERGENCY)
Facility: HOSPITAL | Age: 75
LOS: 0 days | Discharge: HOME | End: 2025-05-21
Attending: EMERGENCY MEDICINE
Payer: MEDICARE

## 2025-05-21 VITALS
OXYGEN SATURATION: 100 % | RESPIRATION RATE: 18 BRPM | DIASTOLIC BLOOD PRESSURE: 62 MMHG | TEMPERATURE: 97 F | HEART RATE: 60 BPM | SYSTOLIC BLOOD PRESSURE: 106 MMHG

## 2025-05-21 DIAGNOSIS — Z98.82 BREAST IMPLANT STATUS: Chronic | ICD-10-CM

## 2025-05-21 DIAGNOSIS — R07.0 PAIN IN THROAT: ICD-10-CM

## 2025-05-21 DIAGNOSIS — Z90.2 ACQUIRED ABSENCE OF LUNG [PART OF]: Chronic | ICD-10-CM

## 2025-05-21 DIAGNOSIS — Z98.41 CATARACT EXTRACTION STATUS, RIGHT EYE: Chronic | ICD-10-CM

## 2025-05-21 DIAGNOSIS — Z98.890 OTHER SPECIFIED POSTPROCEDURAL STATES: Chronic | ICD-10-CM

## 2025-05-21 DIAGNOSIS — Z90.49 ACQUIRED ABSENCE OF OTHER SPECIFIED PARTS OF DIGESTIVE TRACT: Chronic | ICD-10-CM

## 2025-05-21 DIAGNOSIS — E78.5 HYPERLIPIDEMIA, UNSPECIFIED: ICD-10-CM

## 2025-05-21 DIAGNOSIS — Z88.0 ALLERGY STATUS TO PENICILLIN: ICD-10-CM

## 2025-05-21 DIAGNOSIS — K21.9 GASTRO-ESOPHAGEAL REFLUX DISEASE WITHOUT ESOPHAGITIS: ICD-10-CM

## 2025-05-21 DIAGNOSIS — Z90.10 ACQUIRED ABSENCE OF UNSPECIFIED BREAST AND NIPPLE: Chronic | ICD-10-CM

## 2025-05-21 PROCEDURE — 99283 EMERGENCY DEPT VISIT LOW MDM: CPT

## 2025-05-21 PROCEDURE — 99284 EMERGENCY DEPT VISIT MOD MDM: CPT

## 2025-05-21 RX ORDER — DEXAMETHASONE 0.5 MG/1
10 TABLET ORAL ONCE
Refills: 0 | Status: COMPLETED | OUTPATIENT
Start: 2025-05-21 | End: 2025-05-21

## 2025-05-21 RX ADMIN — DEXAMETHASONE 10 MILLIGRAM(S): 0.5 TABLET ORAL at 22:55

## 2025-05-21 NOTE — ED PROVIDER NOTE - PATIENT PORTAL LINK FT
You can access the FollowMyHealth Patient Portal offered by [facility] by registering at the following website: [email or web address]. By joining [employer]’s FollowMyHealth portal, you will also be able to view your health information using other applications (apps) compatible with our system.

## 2025-05-21 NOTE — ED ADULT NURSE NOTE - NS ED NOTE ABUSE SUSPICION NEGLECT YN
Child/Adolescent [facility] Daily Treatment Note       Number of patients in group today: 8     From 08:00-09:00, Pt participated in breakfast and a social skills group; Pt was socially appropriate, cooperative, participated in introducing themselves, as well as playing a group card game.     From 09:00 to 11:30 the PT engaged in a process group where they rated their mood a 6 on a scale from 1 through 10 where 10 is the highest rating. They presented with anxious mood and affect. Pt reported \"I am feeling anxious for being in a new place.\"   Pt noted how drawing and listening to music as coping skills. Pt expressed have various supports. Pt reported going to the  market with their mom yesterday, and they enjoyed their time. Pt denied any current safety concerns.     Pt spent time with RN and MD during this time (see notes).      From 11:30 to 12:30  Pt attended lunch, Pt was appropriate and interactive during lunch time while they ate lunch and watched a movie.    From 12:30 to 13:30 Patients participated in a group on Emotions. '[place]': Documentary series by Dr. Tiffani Yates was shown as an interactive journey through the range of emotions and experiences that define what it means to be human. Discussion was facilitated with group members on the importance of identifying emotions to create deeper connections, enhance their communication skills, and improve their mental well-being.        Grated in a goals discussion group; Pt spent time outside before .  Pt expressed no safety concerns about returning home.       Bethany Santos, LALA       6/27/2024    No

## 2025-05-21 NOTE — ED ADULT NURSE NOTE - NS ED PATIENT SAFETY CONCERN
No
fever, dry cough, runny nose and vomit x1 (after medication) since last night. Mother reports ear infection and abdominal pain, treated with antibiotics in November.  Had exposure to COVID on 11/21 but has subsequently tested negative twice and resumed in-classroom activities yesterday.  PE: nontoxic appearing, NAD, no conjunctival injection or ocular discharge, TMs normal kyung, neck supple, throat normal, chest CTA, abd soft.  A/P  suspect viral URI

## 2025-05-21 NOTE — ED PROVIDER NOTE - NSFOLLOWUPINSTRUCTIONS_ED_ALL_ED_FT
Please follow up with your primary care physician.     Sore Throat    A sore throat is pain, burning, irritation, or scratchiness in the throat. When you have a sore throat, you may feel pain or tenderness in your throat when you swallow or talk.    Many things can cause a sore throat, including:    An infection.  Seasonal allergies.  Dryness in the air.  Irritants, such as smoke or pollution.  Gastroesophageal reflux disease (GERD).  A tumor.    A sore throat is often the first sign of another sickness. It may happen with other symptoms, such as coughing, sneezing, fever, and swollen neck glands. Most sore throats go away without medical treatment.     HOME CARE INSTRUCTIONS  Take over-the-counter medicines only as told by your health care provider.  Drink enough fluids to keep your urine clear or pale yellow.  Rest as needed.  To help with pain, try:  Sipping warm liquids, such as broth, herbal tea, or warm water.  Eating or drinking cold or frozen liquids, such as frozen ice pops.  Gargling with a salt-water mixture 3–4 times a day or as needed. To make a salt-water mixture, completely dissolve ½–1 tsp of salt in 1 cup of warm water.  Sucking on hard candy or throat lozenges.  Putting a cool-mist humidifier in your bedroom at night to moisten the air.  Sitting in the bathroom with the door closed for 5–10 minutes while you run hot water in the shower.  Do not use any tobacco products, such as cigarettes, chewing tobacco, and e-cigarettes. If you need help quitting, ask your health care provider.    SEEK MEDICAL CARE IF:  You have a fever for more than 2–3 days.  You have symptoms that last (are persistent) for more than 2–3 days.  Your throat does not get better within 7 days.  You have a fever and your symptoms suddenly get worse.    SEEK IMMEDIATE MEDICAL CARE IF:  You have difficulty breathing.  You cannot swallow fluids, soft foods, or your saliva.  You have increased swelling in your throat or neck.  You have persistent nausea and vomiting.    ADDITIONAL NOTES AND INSTRUCTIONS    Please follow up with your Primary MD in 24-48 hr.  Seek immediate medical care for any new/worsening signs or symptoms.

## 2025-05-21 NOTE — ED PROVIDER NOTE - OBJECTIVE STATEMENT
74-year-old female past medical history of lung cancer in remission, hyperlipidemia, GERD presents with throat discomfort.  Patient states that few hours ago she noted a little bit of discomfort in the back of her throat worse with swallowing.  Denies any fevers or recent URIs.  No difficulty breathing.  No chest pain or pressure.  Was concerned so came in for evaluation.

## 2025-05-21 NOTE — ED PROVIDER NOTE - PHYSICAL EXAMINATION
CONSTITUTIONAL: Well-developed; well-nourished; in no acute distress.   SKIN: warm, dry  HEAD: Normocephalic; atraumatic.  EYES: PERRL, EOMI, no conjunctival erythema  ENT: No nasal discharge; airway clear. midline uvula. non erythematous pharynx, no exudates. protecting airway and secretions.   NECK: Supple; non tender.  CARD: S1, S2 normal;  Regular rate and rhythm.   RESP: No wheezes, rales or rhonchi.  LYMPH: No acute cervical adenopathy.  NEURO: Alert, oriented, grossly unremarkable. neurovascularly intact

## 2025-05-21 NOTE — ED PROVIDER NOTE - CLINICAL SUMMARY MEDICAL DECISION MAKING FREE TEXT BOX
Patient presents with throat pain.  Nonerythematous pharynx, midline uvula.  No exudates.  Protecting airway secretions.  Tolerating p.o. in ED.  Decadron given.  Discharged with PMD follow-up and return precautions.

## 2025-05-28 ENCOUNTER — APPOINTMENT (OUTPATIENT)
Dept: PULMONOLOGY | Facility: CLINIC | Age: 75
End: 2025-05-28
Payer: MEDICARE

## 2025-05-28 VITALS
DIASTOLIC BLOOD PRESSURE: 64 MMHG | OXYGEN SATURATION: 96 % | WEIGHT: 91 LBS | SYSTOLIC BLOOD PRESSURE: 104 MMHG | BODY MASS INDEX: 18.35 KG/M2 | HEART RATE: 76 BPM | HEIGHT: 59 IN

## 2025-05-28 DIAGNOSIS — R91.1 SOLITARY PULMONARY NODULE: ICD-10-CM

## 2025-05-28 DIAGNOSIS — R09.82 POSTNASAL DRIP: ICD-10-CM

## 2025-05-28 DIAGNOSIS — R06.02 SHORTNESS OF BREATH: ICD-10-CM

## 2025-05-28 DIAGNOSIS — J45.991 COUGH VARIANT ASTHMA: ICD-10-CM

## 2025-05-28 PROCEDURE — G2211 COMPLEX E/M VISIT ADD ON: CPT

## 2025-05-28 PROCEDURE — 99214 OFFICE O/P EST MOD 30 MIN: CPT

## 2025-05-28 RX ORDER — TIOTROPIUM BROMIDE INHALATION SPRAY 1.56 UG/1
1.25 SPRAY, METERED RESPIRATORY (INHALATION)
Qty: 1 | Refills: 3 | Status: ACTIVE | COMMUNITY
Start: 2025-05-28 | End: 1900-01-01

## 2025-06-02 ENCOUNTER — APPOINTMENT (OUTPATIENT)
Dept: PULMONOLOGY | Facility: HOSPITAL | Age: 75
End: 2025-06-02

## 2025-06-03 ENCOUNTER — LABORATORY RESULT (OUTPATIENT)
Age: 75
End: 2025-06-03

## 2025-06-04 ENCOUNTER — NON-APPOINTMENT (OUTPATIENT)
Age: 75
End: 2025-06-04

## 2025-06-06 ENCOUNTER — APPOINTMENT (OUTPATIENT)
Dept: PULMONOLOGY | Facility: HOSPITAL | Age: 75
End: 2025-06-06

## 2025-06-12 ENCOUNTER — APPOINTMENT (OUTPATIENT)
Dept: UROLOGY | Facility: CLINIC | Age: 75
End: 2025-06-12

## 2025-06-17 ENCOUNTER — APPOINTMENT (OUTPATIENT)
Dept: SURGERY | Facility: CLINIC | Age: 75
End: 2025-06-17

## 2025-06-23 ENCOUNTER — APPOINTMENT (OUTPATIENT)
Dept: PULMONOLOGY | Facility: CLINIC | Age: 75
End: 2025-06-23

## 2025-06-24 ENCOUNTER — APPOINTMENT (OUTPATIENT)
Dept: PULMONOLOGY | Facility: HOSPITAL | Age: 75
End: 2025-06-24

## 2025-07-03 ENCOUNTER — OUTPATIENT (OUTPATIENT)
Dept: OUTPATIENT SERVICES | Facility: HOSPITAL | Age: 75
LOS: 1 days | End: 2025-07-03
Payer: MEDICARE

## 2025-07-03 ENCOUNTER — APPOINTMENT (OUTPATIENT)
Dept: PULMONOLOGY | Facility: HOSPITAL | Age: 75
End: 2025-07-03

## 2025-07-03 DIAGNOSIS — Z90.10 ACQUIRED ABSENCE OF UNSPECIFIED BREAST AND NIPPLE: Chronic | ICD-10-CM

## 2025-07-03 DIAGNOSIS — Z98.82 BREAST IMPLANT STATUS: Chronic | ICD-10-CM

## 2025-07-03 DIAGNOSIS — R06.02 SHORTNESS OF BREATH: ICD-10-CM

## 2025-07-03 DIAGNOSIS — Z90.2 ACQUIRED ABSENCE OF LUNG [PART OF]: Chronic | ICD-10-CM

## 2025-07-03 DIAGNOSIS — Z98.41 CATARACT EXTRACTION STATUS, RIGHT EYE: Chronic | ICD-10-CM

## 2025-07-03 DIAGNOSIS — Z98.890 OTHER SPECIFIED POSTPROCEDURAL STATES: Chronic | ICD-10-CM

## 2025-07-03 DIAGNOSIS — Z90.49 ACQUIRED ABSENCE OF OTHER SPECIFIED PARTS OF DIGESTIVE TRACT: Chronic | ICD-10-CM

## 2025-07-03 PROCEDURE — 94060 EVALUATION OF WHEEZING: CPT | Mod: 26

## 2025-07-03 PROCEDURE — 94729 DIFFUSING CAPACITY: CPT

## 2025-07-03 PROCEDURE — 94727 GAS DIL/WSHOT DETER LNG VOL: CPT | Mod: 26

## 2025-07-03 PROCEDURE — 94070 EVALUATION OF WHEEZING: CPT

## 2025-07-03 PROCEDURE — 94727 GAS DIL/WSHOT DETER LNG VOL: CPT

## 2025-07-03 PROCEDURE — 94664 DEMO&/EVAL PT USE INHALER: CPT

## 2025-07-04 DIAGNOSIS — R06.02 SHORTNESS OF BREATH: ICD-10-CM

## 2025-07-23 ENCOUNTER — APPOINTMENT (OUTPATIENT)
Dept: ORTHOPEDIC SURGERY | Facility: CLINIC | Age: 75
End: 2025-07-23
Payer: MEDICARE

## 2025-07-23 DIAGNOSIS — S99.912A UNSPECIFIED INJURY OF LEFT ANKLE, INITIAL ENCOUNTER: ICD-10-CM

## 2025-07-23 PROCEDURE — 99213 OFFICE O/P EST LOW 20 MIN: CPT | Mod: 25

## 2025-07-23 PROCEDURE — 73610 X-RAY EXAM OF ANKLE: CPT | Mod: LT

## 2025-08-22 ENCOUNTER — APPOINTMENT (OUTPATIENT)
Dept: CARDIOLOGY | Facility: CLINIC | Age: 75
End: 2025-08-22
Payer: MEDICARE

## 2025-08-22 VITALS — SYSTOLIC BLOOD PRESSURE: 118 MMHG | HEART RATE: 61 BPM | DIASTOLIC BLOOD PRESSURE: 60 MMHG

## 2025-08-22 VITALS — HEIGHT: 59 IN | BODY MASS INDEX: 18.35 KG/M2 | WEIGHT: 91 LBS

## 2025-08-22 DIAGNOSIS — E78.5 HYPERLIPIDEMIA, UNSPECIFIED: ICD-10-CM

## 2025-08-22 DIAGNOSIS — E87.5 HYPERKALEMIA: ICD-10-CM

## 2025-08-22 DIAGNOSIS — D49.0 NEOPLASM OF UNSPECIFIED BEHAVIOR OF DIGESTIVE SYSTEM: ICD-10-CM

## 2025-08-22 DIAGNOSIS — Z09 ENCOUNTER FOR FOLLOW-UP EXAMINATION AFTER COMPLETED TREATMENT FOR CONDITIONS OTHER THAN MALIGNANT NEOPLASM: ICD-10-CM

## 2025-08-22 PROCEDURE — 99214 OFFICE O/P EST MOD 30 MIN: CPT

## 2025-08-22 PROCEDURE — 93000 ELECTROCARDIOGRAM COMPLETE: CPT

## 2025-08-22 RX ORDER — FLUTICASONE PROPIONATE 50 MCG
50 SPRAY, SUSPENSION (ML) NASAL
Refills: 0 | Status: ACTIVE | COMMUNITY

## 2025-08-22 RX ORDER — FAMOTIDINE 40 MG/1
40 TABLET, FILM COATED ORAL
Refills: 0 | Status: ACTIVE | COMMUNITY